# Patient Record
Sex: MALE | Race: BLACK OR AFRICAN AMERICAN | Employment: FULL TIME | URBAN - METROPOLITAN AREA
[De-identification: names, ages, dates, MRNs, and addresses within clinical notes are randomized per-mention and may not be internally consistent; named-entity substitution may affect disease eponyms.]

---

## 2017-01-22 RX ORDER — INSULIN LISPRO 100 [IU]/ML
INJECTION, SOLUTION INTRAVENOUS; SUBCUTANEOUS
Qty: 180 ML | Refills: 1 | Status: SHIPPED | OUTPATIENT
Start: 2017-01-22 | End: 2017-07-10 | Stop reason: SDUPTHER

## 2017-01-25 ENCOUNTER — OFFICE VISIT (OUTPATIENT)
Dept: INTERNAL MEDICINE CLINIC | Age: 42
End: 2017-01-25

## 2017-01-25 VITALS
HEART RATE: 106 BPM | RESPIRATION RATE: 16 BRPM | DIASTOLIC BLOOD PRESSURE: 56 MMHG | SYSTOLIC BLOOD PRESSURE: 124 MMHG | WEIGHT: 315 LBS | OXYGEN SATURATION: 98 % | BODY MASS INDEX: 47.74 KG/M2 | TEMPERATURE: 99.4 F | HEIGHT: 68 IN

## 2017-01-25 DIAGNOSIS — R68.89 FLU-LIKE SYMPTOMS: Primary | ICD-10-CM

## 2017-01-25 LAB
QUICKVUE INFLUENZA TEST: POSITIVE
VALID INTERNAL CONTROL?: YES

## 2017-01-25 RX ORDER — OSELTAMIVIR PHOSPHATE 75 MG/1
75 CAPSULE ORAL 2 TIMES DAILY
Qty: 10 CAP | Refills: 0 | Status: SHIPPED | OUTPATIENT
Start: 2017-01-25 | End: 2017-01-30

## 2017-01-25 NOTE — MR AVS SNAPSHOT
Visit Information Date & Time Provider Department Dept. Phone Encounter #  
 1/25/2017 10:15 AM Gretta Bloch, MD [facility] [phone] [id number] Your Appointments 2/7/2017 10:00 AM  
ROUTINE CARE with Gretta Bloch, MD  
[facility]) Appt Note: 1 month f/u  
 [place] [address] [facility] [id number] [place] [address] [address] [address] Upcoming Health Maintenance Date Due DTaP/Tdap/Td series (1 - Tdap) 10/20/1996 FOOT EXAM Q1 6/20/2015 HEMOGLOBIN A1C Q6M 10/22/2015 EYE EXAM RETINAL OR DILATED Q1 3/3/2016 MICROALBUMIN Q1 11/8/2017 LIPID PANEL Q1 11/8/2017 Allergies as of 1/25/2017  Review Complete On: 11/8/2016 By: Snehal Carolina LPN Severity Noted Reaction Type Reactions Lisinopril  12/09/2011    Cough Current Immunizations  Reviewed on 11/8/2016 Name Date Influenza Vaccine (Quad) PF 11/8/2016, 9/22/2014 Influenza Vaccine Split 12/9/2011, 10/15/2010 Pneumococcal Polysaccharide (PPSV-23) 3/21/2014 Not reviewed this visit You Were Diagnosed With   
  
 Codes Comments Flu-like symptoms    -  Primary ICD-10-CM: R68.89 ICD-9-CM: 780.99 Vitals BP Pulse Temp Resp Height(growth percentile) Weight(growth percentile) 124/56 (BP 1 Location: Left arm, BP Patient Position: Sitting) (!) 106 99.4 °F (37.4 °C) (Oral) 16 5' 8\" (1.727 m) 350 lb (158.8 kg) SpO2 BMI Smoking Status 98% 53.22 kg/m2 Never Smoker Vitals History BMI and BSA Data Body Mass Index Body Surface Area  
 53.22 kg/m 2 2.76 m 2 Preferred Pharmacy Pharmacy Name Phone [id number] [address] [phone] Your Updated Medication List  
  
   
 This list is accurate as of: 1/25/17 10:54 AM.  Always use your most recent med list.  
  
  
  
  
  insulin pump Misc Commonly known as:  PATIENT SUPPLIED  
by Does Not Apply route. aspirin 81 mg tablet Take  by mouth. atorvastatin 10 mg tablet Commonly known as:  LIPITOR  
TAKE 1 TABLET DAILY * Blood-Glucose Meter monitoring kit Commonly known as:  Gonzalo Givens  
by Does Not Apply route. Test 4 times daily * Blood-Glucose Meter Misc Commonly known as:  Yamileth Bybee IQ METER Use QID  
  
 empagliflozin 10 mg tablet Commonly known as:  Rhenda Cools Take 1 Tab by mouth daily. glucose blood VI test strips strip Commonly known as:  ONETOUCH ULTRA TEST Use QID * HumaLOG 100 unit/mL injection Generic drug:  insulin lispro USE AS DIRECTED IN INSULIN PUMP  
  
 * HumaLOG 100 unit/mL injection Generic drug:  insulin lispro INJECT IN INSULIN PUMP 12 UNITS PER HOUR PLUS BOLUSES. USE UP TO 6 VIALS EVERY MONTH (OFFICE VISIT REQUESTED) * losartan-hydroCHLOROthiazide 50-12.5 mg per tablet Commonly known as:  HYZAAR  
TAKE 1 TAB BY MOUTH DAILY. * losartan-hydroCHLOROthiazide 50-12.5 mg per tablet Commonly known as:  HYZAAR  
TAKE 1 TABLET BY MOUTH EVERY DAY  
  
 oseltamivir 75 mg capsule Commonly known as:  TAMIFLU Take 1 Cap by mouth two (2) times a day for 5 days. * Notice: This list has 6 medication(s) that are the same as other medications prescribed for you. Read the directions carefully, and ask your doctor or other care provider to review them with you. Prescriptions Sent to Pharmacy Refills  
 oseltamivir (TAMIFLU) 75 mg capsule 0 Sig: Take 1 Cap by mouth two (2) times a day for 5 days. Class: Normal  
 Pharmacy: [facility] [address] [place] [address] [address] Ph #: [phone] Route: Oral  
  
We Performed the Following AMB POC RAPID INFLUENZA TEST [01268 CPT(R)] Introducing [facility]! Cinthia Ríosa introduces [employer] patient portal. Now you can access parts of your medical record, email your doctor's office, and request medication refills online. 1. In your internet browser, go to [email or web address] 2. Click on the First Time User? Click Here link in the Sign In box. You will see the New Member Sign Up page. 3. Enter your [employer] Access Code exactly as it appears below. You will not need to use this code after youve completed the sign-up process. If you do not sign up before the expiration date, you must request a new code. · [employer] Access Code: [email or web address] Expires: 2/6/2017  3:14 PM 
 
4. Enter the last four digits of your Social Security Number (xxxx) and Date of Birth (mm/dd/yyyy) as indicated and click Submit. You will be taken to the next sign-up page. 5. Create a [employer] ID. This will be your [employer] login ID and cannot be changed, so think of one that is secure and easy to remember. 6. Create a [employer] password. You can change your password at any time. 7. Enter your Password Reset Question and Answer. This can be used at a later time if you forget your password. 8. Enter your e-mail address. You will receive e-mail notification when new information is available in [address] 9. Click Sign Up. You can now view and download portions of your medical record. 10. Click the Download Summary menu link to download a portable copy of your medical information. If you have questions, please visit the Frequently Asked Questions section of the [employer] website. Remember, [employer] is NOT to be used for urgent needs. For medical emergencies, dial 911. Now available from your iPhone and Android! Please provide this summary of care documentation to your next provider. Your primary care clinician is listed as Ting Jimenes.  If you have any questions after today's visit, please call [phone].

## 2017-01-25 NOTE — LETTER
NOTIFICATION RETURN TO WORK 
 
1/25/2017 10:52 AM 
 
Mr[address] 
[address] [id number] To Whom It May Concern: María Mohr is currently under the care of Sade Rae.. He will return to work on : Saturday, Jan 28th, 2017 If there are questions or concerns please have the patient contact our office. Sincerely, Riley Cortez MD

## 2017-01-25 NOTE — PROGRESS NOTES
Subjective: Alberto Innocent. is a 39 y.o. male who present complaining of flu-like symptoms: fevers, chills, myalgias, congestion, sore throat and cough for 5 days. Diarrhea one day  He denies dyspnea or wheezing. Smoking status: non-smoker. Flu vaccine status: vaccinated currently. only   < 2 weeks ago  Relevant PMH: DM.  Using nyquil dayquil  Review of Systems  A comprehensive review of systems was negative except for that written in the HPI. Past Medical History   Diagnosis Date    DM (diabetes mellitus) ([address]     HLD (hyperlipidaemia)     HTN        Objective:     Visit Vitals    /56 (BP 1 Location: Left arm, BP Patient Position: Sitting)    Pulse (!) 106    Temp 99.4 °F (37.4 °C) (Oral)    Resp 16    Ht 5' 8\" (1.727 m)    Wt 350 lb (158.8 kg)    SpO2 98%    BMI 53.22 kg/m2       Appears moderately ill but not toxic; temperature as noted in vitals. Ears normal.   Throat and pharynx normal.    Neck supple. No adenopathy in the neck. Sinuses non tender. The chest is clear. Assessment/Plan:   Influenza very likely from clinical presentation and seasonal pattern  Considerations for specific influenza anti-viral therapy: symptoms present > 48 hours, antiviral therapy unlikely to be effective  Symptomatic therapy suggested: rest, increase fluids, gargle prn for sore throat, apply heat to sinuses prn and call prn if symptoms persist or worsen. Call or return to clinic prn if these symptoms worsen or fail to improve as anticipated. Deepali Prather was seen today for headache, sore throat, diarrhea and fever. Diagnoses and all orders for this visit:    Flu-like symptoms  -     AMB POC RAPID INFLUENZA TEST  -     Cancel: AMB POC RAPID STREP A    Other orders  -     oseltamivir (TAMIFLU) 75 mg capsule; Take 1 Cap by mouth two (2) times a day for 5 days.     .will treat Influenza really worse since yesterday

## 2017-02-01 ENCOUNTER — APPOINTMENT (OUTPATIENT)
Dept: GENERAL RADIOLOGY | Age: 42
End: 2017-02-01
Attending: EMERGENCY MEDICINE
Payer: COMMERCIAL

## 2017-02-01 ENCOUNTER — HOSPITAL ENCOUNTER (EMERGENCY)
Age: 42
Discharge: HOME | End: 2017-02-01
Attending: EMERGENCY MEDICINE
Payer: COMMERCIAL

## 2017-02-01 VITALS
BODY MASS INDEX: 46.65 KG/M2 | WEIGHT: 315 LBS | HEIGHT: 69 IN | OXYGEN SATURATION: 98 % | SYSTOLIC BLOOD PRESSURE: 152 MMHG | RESPIRATION RATE: 16 BRPM | TEMPERATURE: 98.1 F | HEART RATE: 102 BPM | DIASTOLIC BLOOD PRESSURE: 98 MMHG

## 2017-02-01 DIAGNOSIS — J06.9 ACUTE UPPER RESPIRATORY INFECTION: Primary | ICD-10-CM

## 2017-02-01 PROCEDURE — 99282 EMERGENCY DEPT VISIT SF MDM: CPT

## 2017-02-01 PROCEDURE — 71020 XR CHEST PA LAT: CPT

## 2017-02-01 RX ORDER — HYDROCODONE BITARTRATE AND HOMATROPINE METHYLBROMIDE ORAL SOLUTION 5; 1.5 MG/5ML; MG/5ML
5 LIQUID ORAL
Qty: 100 ML | Refills: 0 | Status: SHIPPED | OUTPATIENT
Start: 2017-02-01 | End: 2018-01-18 | Stop reason: ALTCHOICE

## 2017-02-01 RX ORDER — BENZONATATE 100 MG/1
200 CAPSULE ORAL
Qty: 30 CAP | Refills: 0 | Status: SHIPPED | OUTPATIENT
Start: 2017-02-01 | End: 2017-02-08

## 2017-02-01 RX ORDER — FAMOTIDINE 20 MG/1
20 TABLET, FILM COATED ORAL 2 TIMES DAILY
Qty: 20 TAB | Refills: 0 | Status: SHIPPED | OUTPATIENT
Start: 2017-02-01 | End: 2018-02-06

## 2017-02-01 RX ORDER — NAPROXEN 500 MG/1
500 TABLET ORAL
Qty: 20 TAB | Refills: 0 | Status: SHIPPED | OUTPATIENT
Start: 2017-02-01 | End: 2018-01-18 | Stop reason: ALTCHOICE

## 2017-02-01 NOTE — LETTER
[address] 
[facility] EMERGENCY DEPT 
[address] [place] [address] [address] [id number] [phone] Work/School Note Date: 2/1/2017 To Whom It May concern: Laura Bravo was seen and treated today in the emergency room by the following provider(s): 
Attending Provider: Ranjit Orosco MD 
Physician Assistant: JOSE White. Laura Bravo may return to work on 2/4/17 or sooner, if feeling better. Sincerely, Destiny William, PA

## 2017-02-02 ENCOUNTER — DOCUMENTATION ONLY (OUTPATIENT)
Dept: INTERNAL MEDICINE CLINIC | Age: 42
End: 2017-02-02

## 2017-02-02 NOTE — DISCHARGE INSTRUCTIONS

## 2017-02-02 NOTE — ED PROVIDER NOTES
HPI Comments: Philip Gonzalez is a 39 y.o. male with PMhx significant for HTN, DM, HLD who presents ambulatory to the ED c/o a progressively worsening dry hacking cough, sore throat, generalized abdominal pain, and lightheadedness x2 weeks. He states that he saw his PCP last week and was diagnosed with influenza noting that he completed his Kamaljit flu prescription. He endorses that his sx were getting better but then the cough started which has progressed to chest discomfort secondary to coughing. He notes that he has been using cough drops, DayQuil, NyQuil, and Motrin for his sx with minor relief leading him to the ED for further evaluation. Pt discloses that his blood sugars have been running slightly high in the low 200's. He denies any h/o kidney disease or liver disease. He specifically denies any decreased appetite, ear pain, nausea, vomiting, or diarrhea at this time. PCP: Mehul Sierra MD      There are no other complaints, changes or physical findings at this time. Written by MEGAN Puentesibeleuterio, as dictated by SUZI Hill     The history is provided by the patient. No  was used. Past Medical History:   Diagnosis Date    DM (diabetes mellitus) ([address]     HLD (hyperlipidaemia)     HTN        Past Surgical History:   Procedure Laterality Date    Hc foot/toes surgery procedure       orif 2000    Hx orthopaedic  1998     pins in toes    Hx refractive surgery           Family History:   Problem Relation Age of Onset    Diabetes Mother        Social History     Social History    Marital status:      Spouse name: N/A    Number of children: N/A    Years of education: N/A     Occupational History    Not on file.      Social History Main Topics    Smoking status: Never Smoker    Smokeless tobacco: Never Used    Alcohol use No    Drug use: No    Sexual activity: Not Currently     Partners: Female     Other Topics Concern    Not on file Social History Narrative         ALLERGIES: Lisinopril    Review of Systems   Constitutional: Negative for appetite change, chills, diaphoresis, fever and unexpected weight change. HENT: Positive for sore throat. Negative for ear pain and rhinorrhea. Eyes: Negative for pain. Respiratory: Positive for cough. Negative for shortness of breath, wheezing and stridor. Cardiovascular: Positive for chest pain (secondary to coughing). Negative for leg swelling. Gastrointestinal: Positive for abdominal pain (\"stomach ache\"). Negative for blood in stool, diarrhea, nausea and vomiting. Genitourinary: Negative for difficulty urinating, dysuria and flank pain. Musculoskeletal: Negative for back pain and neck stiffness. Skin: Negative for rash. Neurological: Positive for light-headedness. Negative for seizures, syncope, weakness and headaches. Psychiatric/Behavioral: Negative for confusion. Patient Vitals for the past 12 hrs:   Temp Pulse Resp BP SpO2   02/01/17 2300 - - - (!) 152/98 -   02/01/17 1849 98.1 °F (36.7 °C) (!) 102 16 (!) 193/98 98 %        Physical Exam   Constitutional: He is oriented to person, place, and time. He appears well-developed and well-nourished. No distress. Ambulatory with out difficulty    HENT:   Head: Normocephalic and atraumatic. Right Ear: External ear normal.   Left Ear: External ear normal.   Nose: Nose normal.   Mouth/Throat: Oropharynx is clear and moist. No oropharyngeal exudate. Eyes: Conjunctivae and EOM are normal. Pupils are equal, round, and reactive to light. Right eye exhibits no discharge. Left eye exhibits no discharge. No scleral icterus. Neck: Normal range of motion. Neck supple. No tracheal deviation present. Cardiovascular: Normal rate, regular rhythm, normal heart sounds and intact distal pulses. Exam reveals no gallop and no friction rub. No murmur heard.   Pulmonary/Chest: Effort normal and breath sounds normal. No respiratory distress. He has no wheezes. He has no rales. He exhibits no tenderness. Dry hacking cough    Abdominal: Soft. Bowel sounds are normal. He exhibits no distension and no mass. There is no tenderness. There is no rebound and no guarding. Musculoskeletal: He exhibits no edema or tenderness. Lymphadenopathy:     He has no cervical adenopathy. Neurological: He is alert and oriented to person, place, and time. No cranial nerve deficit. Coordination normal.   Skin: Skin is warm and dry. No rash noted. No erythema. Psychiatric: He has a normal mood and affect. His behavior is normal. Judgment and thought content normal.   Nursing note and vitals reviewed. MDM  Number of Diagnoses or Management Options  Acute upper respiratory infection:   Elevated blood pressure:   Diagnosis management comments: DDx: Elevated blood pressure, URI, PNA, Bronchitis. Amount and/or Complexity of Data Reviewed  Tests in the radiology section of CPT®: ordered and reviewed  Review and summarize past medical records: yes    Patient Progress  Patient progress: stable    ED Course       Procedures      PROGRESS NOTE:  9:31 PM  SUZI Zavala was at pt's room for evaluation but the pt was not present. Written by MEGAN Moore, as dictated by SUZI Zavala. PROGRESS NOTE:  9:44  PM  SUZI Zavala was at pt's room for evaluation but the pt was not present. Written by MEGAN Moore, as dictated by SUZI Zavala. PROGRESS NOTE:  10:29 PM  Pt has been re-evaluated. The pt notes that he is driving himself and asked if his prescriptions could be sent to his pharmacy for  later tonight. Written by MEGAN Moore, as dictated by SUZI Zavala. PROGRESS NOTE:  11:10 PM  Pt has been re-evaluated. Pt denies any headache, dizziness, or blurred vision. He was advised to have his blood pressure checked when he is not in the ED. Written by Carrie Ortega ED Erick, as dictated by [employer]. IMAGING RESULTS:  XR CHEST PA LAT   Final Result   CLINICAL HISTORY: Cough and fever   INDICATION: Cough and fever     COMPARISON: None     FINDINGS:   PA and lateral views of the chest are obtained. The cardiopericardial silhouette is within normal limits. There is no pleural  effusion, pneumothorax or focal consolidation present.     IMPRESSION  IMPRESSION: No acute intrathoracic disease. IMPRESSION:  1. Acute upper respiratory infection    2. Elevated blood pressure        PLAN:  1. Current Discharge Medication List      START taking these medications    Details   HYDROcodone-homatropine (HYDROMET) 5-1.5 mg/5 mL syrup Take 5 mL by mouth four (4) times daily as needed. Max Daily Amount: 20 mL. Qty: 100 mL, Refills: 0      benzonatate (TESSALON PERLES) 100 mg capsule Take 2 Caps by mouth three (3) times daily as needed for Cough for up to 7 days. Qty: 30 Cap, Refills: 0      naproxen (NAPROSYN) 500 mg tablet Take 1 Tab by mouth every twelve (12) hours as needed for Pain. Qty: 20 Tab, Refills: 0      famotidine (PEPCID) 20 mg tablet Take 1 Tab by mouth two (2) times a day. Qty: 20 Tab, Refills: 0         CONTINUE these medications which have NOT CHANGED    Details   !! HUMALOG 100 unit/mL injection INJECT IN INSULIN PUMP 12 UNITS PER HOUR PLUS BOLUSES. USE UP TO 6 VIALS EVERY MONTH (OFFICE VISIT REQUESTED)  Qty: 180 mL, Refills: 1      !! losartan-hydroCHLOROthiazide (HYZAAR) 50-12.5 mg per tablet TAKE 1 TABLET BY MOUTH EVERY DAY  Qty: 30 Tab, Refills: 5      !! losartan-hydroCHLOROthiazide (HYZAAR) 50-12.5 mg per tablet TAKE 1 TAB BY MOUTH DAILY. Qty: 90 Tab, Refills: 1      atorvastatin (LIPITOR) 10 mg tablet TAKE 1 TABLET DAILY  Qty: 90 Tab, Refills: 2      empagliflozin (JARDIANCE) 10 mg tablet Take 1 Tab by mouth daily.   Qty: 30 Tab, Refills: 5      Blood-Glucose Meter (ONE TOUCH VERIO IQ METER) misc Use QID  Qty: 1 each, Refills: 0      glucose blood VI test strips (ONE TOUCH ULTRA TEST) strip Use QID  Qty: 1 Package, Refills: 11      !! HUMALOG 100 unit/mL injection USE AS DIRECTED IN INSULIN PUMP  Qty: 30 mL, Refills: 1      Blood-Glucose Meter (ONE TOUCH ULTRA 2) monitoring kit by Does Not Apply route. Test 4 times daily  Qty: 3 Kit, Refills: 5      Subcutaneous Insulin Pump Misc by Does Not Apply route. Associated Diagnoses: DM (diabetes mellitus) ([facility]      aspirin 81 mg tablet Take  by mouth. !! - Potential duplicate medications found. Please discuss with provider. 2.   Follow-up Information     Follow up With Details Comments 1917 Lowell Martinez MD   [address] [place] [address] Ave  [phone]      [facility] EMERGENCY DEPT  If symptoms worsen [address]  [facility] [address]   [address] [address] [phone]        3. Return to ED if worse   Discharge Note:  11:07 PM  The patient is ready for discharge. The patient's signs, symptoms, diagnosis, and discharge instruction have been discussed and the patient has conveyed their understanding. The patient is to follow up as recommended or return to the ER should their symptoms worsen. Plan has been discussed and the patient is in agreement. Written by Roxy Ortega ED Scribe, as dictated by SUZI Torres    Attestation: This note is prepared by Zenaida Ortega, acting as Scribe for [employer]. SUZI Torres: The scribe's documentation has been prepared under my direction and personally reviewed by me in its entirety. I confirm that the note above accurately reflects all work, treatment, procedures, and medical decision making performed by me.

## 2017-02-02 NOTE — ED NOTES
Discharge instructions reviewed with pt and copy given along with RX by ER PA Doreatha Osler. Pt ambulatory from ED accompanied by self in no sign of distress or discomfort.

## 2017-02-02 NOTE — PROGRESS NOTES
[facility] for [facility] ED visit on 02/01/2017, chief complaint acute upper resp. Infection. NN reviewed chart patient had OV on 1/25, +flu at time given tamifllu. Patient presented to ED after finishing tamiflu with no improvement. Given prescription for tessalon perles, naproxen, hydrocodone, pepcid and discharged home. Follow up appointment scheduled with  on [email or web address].  Patient needs follow up for DM mgmt/education. It appears patients last A1C was 10.9 done in 04/2015 and BMI is 51.86. Patient has been on insulin since age of 15, patient has an insulin pump.  has hypertension as well that is not controlled. BP readings in ED were 193/98 and 152/98 with pulse in the low 100s.   NN will follow up with patient at [facility] on 2/7/17

## 2017-02-03 ENCOUNTER — OFFICE VISIT (OUTPATIENT)
Dept: INTERNAL MEDICINE CLINIC | Age: 42
End: 2017-02-03

## 2017-02-03 VITALS
HEART RATE: 92 BPM | BODY MASS INDEX: 46.65 KG/M2 | RESPIRATION RATE: 17 BRPM | SYSTOLIC BLOOD PRESSURE: 162 MMHG | OXYGEN SATURATION: 97 % | TEMPERATURE: 97.8 F | WEIGHT: 315 LBS | DIASTOLIC BLOOD PRESSURE: 92 MMHG | HEIGHT: 69 IN

## 2017-02-03 DIAGNOSIS — J06.9 ACUTE URI: Primary | ICD-10-CM

## 2017-02-03 RX ORDER — AZITHROMYCIN 250 MG/1
250 TABLET, FILM COATED ORAL SEE ADMIN INSTRUCTIONS
Qty: 6 TAB | Refills: 0 | Status: SHIPPED | OUTPATIENT
Start: 2017-02-03 | End: 2017-02-08

## 2017-02-03 NOTE — PROGRESS NOTES
Subjective: Chana Moran is a 39 y.o. male who complains of congestion, sore throat, productive cough and headache for several days, gradually worsening since that time. He denies a history of shortness of breath and wheezing. Evaluation to date: patient was seen here on January 25 th and was diagnosed with the flu. He started the tamiflu and states he was feeling better. Then his symptoms seemed to be coming back so he went to the ER on Feb 1 st. He was diagnosed with a URI. He was not given an antibiotic at that time because his mucous was still clear and they thought it was viral. Today he states he is not better and now his mucous is greenish. 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 Objective:     Visit Vitals    BP (!) 162/92 (BP 1 Location: Left arm, BP Patient Position: Sitting)    Pulse 92    Temp 97.8 °F (36.6 °C) (Oral)    Resp 17    Ht 5' 9\" (1.753 m)    Wt (!) 354 lb (160.6 kg)    SpO2 97%    BMI 52.28 kg/m2     General:  alert, cooperative, no distress   Eyes: negative   Ears: normal TM's and external ear canals AU   Sinuses: Normal paranasal sinuses without tenderness   Mouth:  abnormal findings: mild oropharyngeal erythema   Neck: supple, symmetrical, trachea midline and no adenopathy. Heart: normal rate and regular rhythm, no murmurs noted.     Lungs: clear to auscultation bilaterally   Abdomen: Not examined        Assessment/Plan:   upper respiratory illness  Antibiotics per orders. RTC prn. Teodora Tineo was seen today for other. Diagnoses and all orders for this visit:    Acute URI  -     azithromycin (ZITHROMAX) 250 mg tablet; Take 1 Tab by mouth See Admin Instructions for 5 days. .take medication as prescribed. If not getting better then follow up. Patient states he has been a little constipated. Suggested he try colace and miralax.

## 2017-02-03 NOTE — MR AVS SNAPSHOT
Visit Information Date & Time Provider Department Dept. Phone Encounter #  
 2/3/2017  8:30 AM Deshaun Kessler PA-C [facility] [phone] [id number] Your Appointments 2/7/2017 10:00 AM  
ROUTINE CARE with Dakota Magallon MD  
[facility]) Appt Note: 1 month f/u  
 [place] [address] [facility] [address]  
[facility] [address] [address] Upcoming Health Maintenance Date Due DTaP/Tdap/Td series (1 - Tdap) 10/20/1996 FOOT EXAM Q1 6/20/2015 HEMOGLOBIN A1C Q6M 10/22/2015 EYE EXAM RETINAL OR DILATED Q1 3/3/2016 MICROALBUMIN Q1 11/8/2017 LIPID PANEL Q1 11/8/2017 Allergies as of 2/3/2017  Review Complete On: 2/3/2017 By: Deshaun Kessler PA-C Severity Noted Reaction Type Reactions Lisinopril  12/09/2011    Cough Current Immunizations  Reviewed on 11/8/2016 Name Date Influenza Vaccine (Quad) PF 11/8/2016, 9/22/2014 Influenza Vaccine Split 12/9/2011, 10/15/2010 Pneumococcal Polysaccharide (PPSV-23) 3/21/2014 Not reviewed this visit You Were Diagnosed With   
  
 Codes Comments Acute URI    -  Primary ICD-10-CM: J06.9 ICD-9-CM: 465.9 Vitals BP Pulse Temp Resp Height(growth percentile) Weight(growth percentile) (!) 162/92 (BP 1 Location: Left arm, BP Patient Position: Sitting) 92 97.8 °F (36.6 °C) (Oral) 17 5' 9\" (1.753 m) (!) 354 lb (160.6 kg) SpO2 BMI Smoking Status 97% 52.28 kg/m2 Never Smoker Vitals History BMI and BSA Data Body Mass Index Body Surface Area  
 52.28 kg/m 2 2.8 m 2 Preferred Pharmacy Pharmacy Name Phone [id number] [address] [phone] Your Updated Medication List  
  
   
 This list is accurate as of: 2/3/17  8:56 AM.  Always use your most recent med list.  
  
  
  
  
  insulin pump Misc Commonly known as:  PATIENT SUPPLIED  
by Does Not Apply route. aspirin 81 mg tablet Take  by mouth. atorvastatin 10 mg tablet Commonly known as:  LIPITOR  
TAKE 1 TABLET DAILY  
  
 azithromycin 250 mg tablet Commonly known as:  Elsworth Heaps Take 1 Tab by mouth See Admin Instructions for 5 days. benzonatate 100 mg capsule Commonly known as:  TESSALON PERLES Take 2 Caps by mouth three (3) times daily as needed for Cough for up to 7 days. * Blood-Glucose Meter monitoring kit Commonly known as:  Author Sos  
by Does Not Apply route. Test 4 times daily * Blood-Glucose Meter Misc Commonly known as:  Jenny Jennifere IQ METER Use QID  
  
 empagliflozin 10 mg tablet Commonly known as:  Lattie Brian Take 1 Tab by mouth daily. famotidine 20 mg tablet Commonly known as:  PEPCID Take 1 Tab by mouth two (2) times a day. glucose blood VI test strips strip Commonly known as:  ONETOUCH ULTRA TEST Use QID * HumaLOG 100 unit/mL injection Generic drug:  insulin lispro USE AS DIRECTED IN INSULIN PUMP  
  
 * HumaLOG 100 unit/mL injection Generic drug:  insulin lispro INJECT IN INSULIN PUMP 12 UNITS PER HOUR PLUS BOLUSES. USE UP TO 6 VIALS EVERY MONTH (OFFICE VISIT REQUESTED) HYDROcodone-homatropine 5-1.5 mg/5 mL syrup Commonly known as:  HYDROMET Take 5 mL by mouth four (4) times daily as needed. Max Daily Amount: 20 mL. * losartan-hydroCHLOROthiazide 50-12.5 mg per tablet Commonly known as:  HYZAAR  
TAKE 1 TAB BY MOUTH DAILY. * losartan-hydroCHLOROthiazide 50-12.5 mg per tablet Commonly known as:  HYZAAR  
TAKE 1 TABLET BY MOUTH EVERY DAY  
  
 naproxen 500 mg tablet Commonly known as:  NAPROSYN Take 1 Tab by mouth every twelve (12) hours as needed for Pain. * Notice: This list has 6 medication(s) that are the same as other medications prescribed for you. Read the directions carefully, and ask your doctor or other care provider to review them with you. Prescriptions Sent to Pharmacy Refills  
 azithromycin (ZITHROMAX) 250 mg tablet 0 Sig: Take 1 Tab by mouth See Admin Instructions for 5 days. Class: Normal  
 Pharmacy: [facility]pharm[facility] [address] [address] [facility] [address] Ph #: [phone] Route: Oral  
  
Introducing [facility]! Ozzy Whitney introduces The Digital Marvels patient portal. Now you can access parts of your medical record, email your doctor's office, and request medication refills online. 1. In your internet browser, go to [email or web address]: [email or web address] 2. Click on the First Time User? Click Here link in the Sign In box. You will see the New Member Sign Up page. 3. Enter your The Digital Marvels Access Code exactly as it appears below. You will not need to use this code after youve completed the sign-up process. If you do not sign up before the expiration date, you must request a new code. · The Digital Marvels Access Code: [email or web address] Expires: 2/6/2017  3:14 PM 
 
4. Enter the last four digits of your Social Security Number (xxxx) and Date of Birth (mm/dd/yyyy) as indicated and click Submit. You will be taken to the next sign-up page. 5. Create a The Digital Marvels ID. This will be your The Digital Marvels login ID and cannot be changed, so think of one that is secure and easy to remember. 6. Create a The Digital Marvels password. You can change your password at any time. 7. Enter your Password Reset Question and Answer. This can be used at a later time if you forget your password. 8. Enter your e-mail address. You will receive e-mail notification when new information is available in [address] 9. Click Sign Up. You can now view and download portions of your medical record. 10. Click the Download Summary menu link to download a portable copy of your medical information. If you have questions, please visit the Frequently Asked Questions section of the BakedCode website. Remember, BakedCode is NOT to be used for urgent needs. For medical emergencies, dial 911. Now available from your iPhone and Android! Please provide this summary of care documentation to your next provider. Your primary care clinician is listed as Saintclair Rima. If you have any questions after today's visit, please call [phone].

## 2017-04-20 RX ORDER — LOSARTAN POTASSIUM AND HYDROCHLOROTHIAZIDE 12.5; 5 MG/1; MG/1
TABLET ORAL
Qty: 90 TAB | Refills: 0 | Status: SHIPPED | OUTPATIENT
Start: 2017-04-20 | End: 2017-08-04 | Stop reason: SDUPTHER

## 2017-07-10 RX ORDER — INSULIN LISPRO 100 [IU]/ML
INJECTION, SOLUTION INTRAVENOUS; SUBCUTANEOUS
Qty: 180 ML | Refills: 0 | Status: SHIPPED | OUTPATIENT
Start: 2017-07-10 | End: 2017-10-10 | Stop reason: SDUPTHER

## 2017-07-16 RX ORDER — ATORVASTATIN CALCIUM 10 MG/1
TABLET, FILM COATED ORAL
Qty: 90 TAB | Refills: 1 | Status: SHIPPED | OUTPATIENT
Start: 2017-07-16 | End: 2017-10-10 | Stop reason: SDUPTHER

## 2017-08-05 RX ORDER — LOSARTAN POTASSIUM AND HYDROCHLOROTHIAZIDE 12.5; 5 MG/1; MG/1
TABLET ORAL
Qty: 90 TAB | Refills: 1 | Status: SHIPPED | OUTPATIENT
Start: 2017-08-05 | End: 2017-10-10 | Stop reason: SDUPTHER

## 2017-10-10 ENCOUNTER — OFFICE VISIT (OUTPATIENT)
Dept: INTERNAL MEDICINE CLINIC | Age: 42
End: 2017-10-10

## 2017-10-10 VITALS
WEIGHT: 315 LBS | HEIGHT: 69 IN | BODY MASS INDEX: 46.65 KG/M2 | TEMPERATURE: 98 F | SYSTOLIC BLOOD PRESSURE: 117 MMHG | HEART RATE: 103 BPM | DIASTOLIC BLOOD PRESSURE: 53 MMHG | OXYGEN SATURATION: 96 % | RESPIRATION RATE: 16 BRPM

## 2017-10-10 DIAGNOSIS — E08.3299 DIABETES MELLITUS DUE TO UNDERLYING CONDITION WITH MILD NONPROLIFERATIVE RETINOPATHY WITHOUT MACULAR EDEMA, WITH LONG-TERM CURRENT USE OF INSULIN, UNSPECIFIED LATERALITY (HCC): Primary | ICD-10-CM

## 2017-10-10 DIAGNOSIS — I10 ESSENTIAL HYPERTENSION: ICD-10-CM

## 2017-10-10 DIAGNOSIS — E78.00 PURE HYPERCHOLESTEROLEMIA: ICD-10-CM

## 2017-10-10 DIAGNOSIS — Z23 ENCOUNTER FOR IMMUNIZATION: ICD-10-CM

## 2017-10-10 DIAGNOSIS — Z79.4 DIABETES MELLITUS DUE TO UNDERLYING CONDITION WITH MILD NONPROLIFERATIVE RETINOPATHY WITHOUT MACULAR EDEMA, WITH LONG-TERM CURRENT USE OF INSULIN, UNSPECIFIED LATERALITY (HCC): Primary | ICD-10-CM

## 2017-10-10 LAB — HBA1C MFR BLD HPLC: 11.2 %

## 2017-10-10 RX ORDER — INSULIN LISPRO 100 [IU]/ML
INJECTION, SOLUTION INTRAVENOUS; SUBCUTANEOUS
Qty: 180 ML | Refills: 1
Start: 2017-10-10 | End: 2017-10-30 | Stop reason: SDUPTHER

## 2017-10-10 RX ORDER — LOSARTAN POTASSIUM AND HYDROCHLOROTHIAZIDE 12.5; 5 MG/1; MG/1
TABLET ORAL
Qty: 90 TAB | Refills: 1 | Status: SHIPPED | OUTPATIENT
Start: 2017-10-10 | End: 2018-01-18 | Stop reason: ALTCHOICE

## 2017-10-10 RX ORDER — ATORVASTATIN CALCIUM 10 MG/1
TABLET, FILM COATED ORAL
Qty: 90 TAB | Refills: 3 | Status: SHIPPED | OUTPATIENT
Start: 2017-10-10 | End: 2018-10-07 | Stop reason: SDUPTHER

## 2017-10-10 NOTE — PROGRESS NOTES
Coordination of Care Questions    1. Have you been to the ER, urgent care clinic since your last visit? No       Hospitalized since your last visit? No    2. Have you seen or consulted any other health care providers outside of the [address] [place] [address] since your last visit? Include any pap smears or colon screening.  No

## 2017-10-10 NOTE — PROGRESS NOTES
SUBJECTIVE:  39 y.o. male for follow up of diabetes. Diabetic Review of Systems - medication compliance: compliant all of the time, diabetic diet compliance: noncompliant some of the time, home glucose monitoring: is performed regularly, fasting values range 120 to 130, acute symptoms are none. Other symptoms and concerns:weight gain. Needing large amount insulin  up to 100 units   Basal   6 U per hour,  15 to 2 u bolus  Has seen lower readings lately  On pump > 6 years5  On Insulin  Since 15 years ago   c peptide suggests type 2    Current Outpatient Prescriptions   Medication Sig Dispense Refill    atorvastatin (LIPITOR) 10 mg tablet TAKE 1 TABLET DAILY 90 Tab 1    losartan-hydroCHLOROthiazide (HYZAAR) 50-12.5 mg per tablet TAKE 1 TABLET BY MOUTH EVERY DAY 30 Tab 5    HUMALOG 100 unit/mL injection USE AS DIRECTED IN INSULIN PUMP 30 mL 1    aspirin 81 mg tablet Take  by mouth.  losartan-hydroCHLOROthiazide (HYZAAR) 50-12.5 mg per tablet TAKE 1 TABLET DAILY 90 Tab 1    HUMALOG 100 unit/mL injection INJECT IN INSULIN PUMP 12 UNITS PER HOUR PLUS BOLUSES. USE UP TO 6 VIALS EVERY MONTH (OFFICE VISIT REQUESTED) 180 mL 0    HYDROcodone-homatropine (HYDROMET) 5-1.5 mg/5 mL syrup Take 5 mL by mouth four (4) times daily as needed. Max Daily Amount: 20 mL. 100 mL 0    naproxen (NAPROSYN) 500 mg tablet Take 1 Tab by mouth every twelve (12) hours as needed for Pain. 20 Tab 0    famotidine (PEPCID) 20 mg tablet Take 1 Tab by mouth two (2) times a day. 20 Tab 0    empagliflozin (JARDIANCE) 10 mg tablet Take 1 Tab by mouth daily. 30 Tab 5    Blood-Glucose Meter (ONE TOUCH VERIO IQ METER) misc Use QID 1 each 0    glucose blood VI test strips (ONE TOUCH ULTRA TEST) strip Use QID 1 Package 11    Blood-Glucose Meter (ONE TOUCH ULTRA 2) monitoring kit by Does Not Apply route. Test 4 times daily 3 Kit 5    Subcutaneous Insulin Pump Misc by Does Not Apply route.        Vitals:    10/10/17 0836   BP: 117/53   Pulse: (!) 103   Resp: 16   Temp: 98 °F (36.7 °C)   TempSrc: Oral   SpO2: 96%   Weight: (!) 362 lb (164.2 kg)   Height: 5' 9\" (1.753 m)     Vitals:    10/10/17 0836   BP: 117/53   Pulse: (!) 103   Resp: 16   Temp: 98 °F (36.7 °C)   TempSrc: Oral   SpO2: 96%   Weight: (!) 362 lb (164.2 kg)   Height: 5' 9\" (1.753 m)         OBJECTIVE:  Appearance: alert, well appearing, and in no distress and overweight.   Visit Vitals    /53 (BP 1 Location: Left arm, BP Patient Position: Sitting)    Pulse (!) 103    Temp 98 °F (36.7 °C) (Oral)    Resp 16    Ht 5' 9\" (1.753 m)    Wt (!) 362 lb (164.2 kg)    SpO2 96%    BMI 53.46 kg/m2     Wt Readings from Last 3 Encounters:   10/10/17 (!) 362 lb (164.2 kg)   02/03/17 (!) 354 lb (160.6 kg)   02/01/17 (!) 351 lb 3.1 oz (159.3 kg)       Cardiovascular ROS: no chest pain or dyspnea on exertion  Neurological ROS: no TIA or stroke symptoms  General ROS: negative for - chills, fatigue, fever, malaise, night sweats or sleep disturbance  Endocrine ROS: negative for - hair pattern changes, hot flashes, malaise/lethargy, palpitations, polydipsia/polyuria, temperature intolerance or unexpected weight changes  Has not seen endocrine  Fatigue frequent awakenings snoring noted  Exam: heart sounds normal rate, regular rhythm, normal S1, S2, no murmurs, rubs, clicks or gallops, chest clear, no hepatosplenomegaly  Some ankle edema  Never got jardiance as requested last year    Insurance would not   OK despite couponASSESSMENT:  Diabetes Mellitus: asymptomatic, poor controlled, no significant medication side effects noted  Suspect type 2 and  Trial alternte meds  [place] a try  Was on metformin at one time  Hypertension borderline  Controlled   edema from obesity  Weight gain from insulin  PLAN:  See orders for this visit as documented in the electronic medical record Issues reviewed with him: referral to Diabetic Education department, diabetic diet discussed in detail, written exchange diet given and low cholesterol diet, weight control and daily exercise discussed. The patient is advised to begin progressive daily aerobic exercise program, follow a low fat, low cholesterol diet and attempt to lose weight. Results for orders placed or performed in visit on 10/10/17   AMB POC HEMOGLOBIN A1C   Result Value Ref Range    Hemoglobin A1c (POC) 11.2 %             Hypertension not controlled for age based on guidelines        Middle of night readings to look for hypoglycemia     Diagnoses and all orders for this visit:    1. 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 Encounter for immunization  Had flu last year  - Influenza virus vaccine (QUADRIVALENT PRES FREE SYRINGE) IM (96671)    3. Essential hypertension  controlled    4.  Pure hypercholesterolemia  controlled    Advise prevnar on outside

## 2017-10-11 LAB
ALBUMIN SERPL-MCNC: 4.3 G/DL (ref 3.5–5.5)
ALBUMIN/CREAT UR: 6 MG/G CREAT (ref 0–30)
ALBUMIN/GLOB SERPL: 1.4 {RATIO} (ref 1.2–2.2)
ALP SERPL-CCNC: 45 IU/L (ref 39–117)
ALT SERPL-CCNC: 45 IU/L (ref 0–44)
AST SERPL-CCNC: 34 IU/L (ref 0–40)
BASOPHILS # BLD AUTO: 0 X10E3/UL (ref 0–0.2)
BASOPHILS NFR BLD AUTO: 0 %
BILIRUB SERPL-MCNC: 0.8 MG/DL (ref 0–1.2)
BUN SERPL-MCNC: 12 MG/DL (ref 6–24)
BUN/CREAT SERPL: 13 (ref 9–20)
CALCIUM SERPL-MCNC: 8.8 MG/DL (ref 8.7–10.2)
CHLORIDE SERPL-SCNC: 97 MMOL/L (ref 96–106)
CHOLEST SERPL-MCNC: 95 MG/DL (ref 100–199)
CO2 SERPL-SCNC: 25 MMOL/L (ref 18–29)
CREAT SERPL-MCNC: 0.93 MG/DL (ref 0.76–1.27)
CREAT UR-MCNC: 66.6 MG/DL
EOSINOPHIL # BLD AUTO: 0.1 X10E3/UL (ref 0–0.4)
EOSINOPHIL NFR BLD AUTO: 3 %
ERYTHROCYTE [DISTWIDTH] IN BLOOD BY AUTOMATED COUNT: 14.2 % (ref 12.3–15.4)
GLOBULIN SER CALC-MCNC: 3 G/DL (ref 1.5–4.5)
GLUCOSE SERPL-MCNC: 92 MG/DL (ref 65–99)
HCT VFR BLD AUTO: 42.2 % (ref 37.5–51)
HDLC SERPL-MCNC: 32 MG/DL
HGB BLD-MCNC: 14.6 G/DL (ref 12.6–17.7)
IMM GRANULOCYTES # BLD: 0 X10E3/UL (ref 0–0.1)
IMM GRANULOCYTES NFR BLD: 0 %
INTERPRETATION, 910389: NORMAL
LDLC SERPL CALC-MCNC: 39 MG/DL (ref 0–99)
LYMPHOCYTES # BLD AUTO: 1.7 X10E3/UL (ref 0.7–3.1)
LYMPHOCYTES NFR BLD AUTO: 38 %
MCH RBC QN AUTO: 28.3 PG (ref 26.6–33)
MCHC RBC AUTO-ENTMCNC: 34.6 G/DL (ref 31.5–35.7)
MCV RBC AUTO: 82 FL (ref 79–97)
MICROALBUMIN UR-MCNC: 4 UG/ML
MONOCYTES # BLD AUTO: 0.3 X10E3/UL (ref 0.1–0.9)
MONOCYTES NFR BLD AUTO: 7 %
NEUTROPHILS # BLD AUTO: 2.3 X10E3/UL (ref 1.4–7)
NEUTROPHILS NFR BLD AUTO: 52 %
PLATELET # BLD AUTO: 180 X10E3/UL (ref 150–379)
POTASSIUM SERPL-SCNC: 3.6 MMOL/L (ref 3.5–5.2)
PROT SERPL-MCNC: 7.3 G/DL (ref 6–8.5)
RBC # BLD AUTO: 5.16 X10E6/UL (ref 4.14–5.8)
SODIUM SERPL-SCNC: 139 MMOL/L (ref 134–144)
TRIGL SERPL-MCNC: 120 MG/DL (ref 0–149)
VLDLC SERPL CALC-MCNC: 24 MG/DL (ref 5–40)
WBC # BLD AUTO: 4.5 X10E3/UL (ref 3.4–10.8)

## 2017-10-30 RX ORDER — INSULIN LISPRO 100 [IU]/ML
INJECTION, SOLUTION INTRAVENOUS; SUBCUTANEOUS
Qty: 180 ML | Refills: 1
Start: 2017-10-30 | End: 2018-01-18 | Stop reason: SDUPTHER

## 2017-11-27 ENCOUNTER — TELEPHONE (OUTPATIENT)
Dept: INTERNAL MEDICINE CLINIC | Age: 42
End: 2017-11-27

## 2017-11-27 NOTE — TELEPHONE ENCOUNTER
Patient is requesting a script for a new insulin pump. He says the other one broke.  He also says he has something to discuss with

## 2018-01-11 NOTE — TELEPHONE ENCOUNTER
Patient called in requesting a short supply of insulin be called into [facility]. His refill coming through the mail order will take too long and he is completely out and the med is schedule to reach him on monday.   [phone]

## 2018-01-14 RX ORDER — INSULIN LISPRO 100 [IU]/ML
INJECTION, SOLUTION INTRAVENOUS; SUBCUTANEOUS
Qty: 10 ML | Refills: 0 | Status: SHIPPED | OUTPATIENT
Start: 2018-01-14 | End: 2018-02-06

## 2018-01-18 ENCOUNTER — OFFICE VISIT (OUTPATIENT)
Dept: INTERNAL MEDICINE CLINIC | Age: 43
End: 2018-01-18

## 2018-01-18 VITALS
BODY MASS INDEX: 46.65 KG/M2 | WEIGHT: 315 LBS | DIASTOLIC BLOOD PRESSURE: 85 MMHG | HEART RATE: 100 BPM | OXYGEN SATURATION: 98 % | HEIGHT: 69 IN | SYSTOLIC BLOOD PRESSURE: 150 MMHG | RESPIRATION RATE: 16 BRPM

## 2018-01-18 DIAGNOSIS — E08.3299 DIABETES MELLITUS DUE TO UNDERLYING CONDITION WITH MILD NONPROLIFERATIVE RETINOPATHY WITHOUT MACULAR EDEMA, WITH LONG-TERM CURRENT USE OF INSULIN, UNSPECIFIED LATERALITY (HCC): Primary | ICD-10-CM

## 2018-01-18 DIAGNOSIS — Z79.4 DIABETES MELLITUS DUE TO UNDERLYING CONDITION WITH MILD NONPROLIFERATIVE RETINOPATHY WITHOUT MACULAR EDEMA, WITH LONG-TERM CURRENT USE OF INSULIN, UNSPECIFIED LATERALITY (HCC): Primary | ICD-10-CM

## 2018-01-18 DIAGNOSIS — I10 ESSENTIAL HYPERTENSION: ICD-10-CM

## 2018-01-18 PROBLEM — E66.01 OBESITY, MORBID (HCC): Status: ACTIVE | Noted: 2018-01-18

## 2018-01-18 LAB — HBA1C MFR BLD HPLC: 9 %

## 2018-01-18 RX ORDER — LOSARTAN POTASSIUM AND HYDROCHLOROTHIAZIDE 25; 100 MG/1; MG/1
1 TABLET ORAL DAILY
Qty: 90 TAB | Refills: 3 | Status: SHIPPED | OUTPATIENT
Start: 2018-01-18 | End: 2018-11-15 | Stop reason: SDUPTHER

## 2018-01-18 RX ORDER — METFORMIN HYDROCHLORIDE 500 MG/1
1000 TABLET, EXTENDED RELEASE ORAL
Qty: 90 TAB | Refills: 1 | Status: SHIPPED | OUTPATIENT
Start: 2018-01-18 | End: 2018-01-18 | Stop reason: SDUPTHER

## 2018-01-18 RX ORDER — METFORMIN HYDROCHLORIDE 500 MG/1
1000 TABLET, EXTENDED RELEASE ORAL
Qty: 60 TAB | Refills: 0 | Status: SHIPPED | OUTPATIENT
Start: 2018-01-18 | End: 2018-02-06 | Stop reason: SDUPTHER

## 2018-01-18 RX ORDER — METFORMIN HYDROCHLORIDE 500 MG/1
1000 TABLET, EXTENDED RELEASE ORAL
Qty: 180 TAB | Refills: 0 | Status: SHIPPED | OUTPATIENT
Start: 2018-01-18 | End: 2018-01-18 | Stop reason: SDUPTHER

## 2018-01-18 RX ORDER — INSULIN LISPRO 100 [IU]/ML
INJECTION, SOLUTION INTRAVENOUS; SUBCUTANEOUS
Qty: 180 ML | Refills: 1
Start: 2018-01-18 | End: 2018-03-09 | Stop reason: SDUPTHER

## 2018-01-18 NOTE — MR AVS SNAPSHOT
[address] [place] [address] [address] [facility] 
[phone] Patient: Abel Low Sr. MRN:  :[id number] Visit Information Date & Time Provider Department Dept. Phone Encounter #  
 2018 10:00 AM Celso Floyd MD [facility] [phone] [id number] Your Appointments 2018 10:50 AM  
New Patient with MD [facility] Appt Note: np get diabetes under control refered by  Jose Pedro [phone] sc 10/11/17  
 [address] [facility] [address] [id number] [address] [facility] Upcoming Health Maintenance Date Due DTaP/Tdap/Td series (1 - Tdap) 10/20/1996 FOOT EXAM Q1 2015 EYE EXAM RETINAL OR DILATED Q1 3/3/2016 HEMOGLOBIN A1C Q6M 4/10/2018 MICROALBUMIN Q1 10/10/2018 LIPID PANEL Q1 10/10/2018 Allergies as of 2018  Review Complete On: 2018 By: Tha Kellogg LPN Severity Noted Reaction Type Reactions Lisinopril  2011    Cough Current Immunizations  Reviewed on 10/10/2017 Name Date Influenza Vaccine (Quad) PF 10/10/2017, 2016, 2014 Influenza Vaccine Split 2011, 10/15/2010 Pneumococcal Polysaccharide (PPSV-23) 3/21/2014 Not reviewed this visit You Were Diagnosed With   
  
 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 
ICD-9-CM: 401.9 Vitals BP Pulse Resp Height(growth percentile) Weight(growth percentile) SpO2 150/85 100 16 5' 9\" (1.753 m) (!) 374 lb (169.6 kg) 98% BMI Smoking Status 55.23 kg/m2 Never Smoker Vitals History BMI and BSA Data Body Mass Index Body Surface Area  
 55.23 kg/m 2 2.87 m 2 Preferred Pharmacy Pharmacy Name Phone [id number] [address] [phone] Your Updated Medication List  
  
   
This list is accurate as of: 1/18/18 10:14 AM.  Always use your most recent med list.  
  
  
  
  
  insulin pump Misc Commonly known as:  PATIENT SUPPLIED  
by Does Not Apply route. aspirin 81 mg tablet Take  by mouth. atorvastatin 10 mg tablet Commonly known as:  LIPITOR  
TAKE 1 TABLET DAILY * Blood-Glucose Meter monitoring kit Commonly known as:  Jorge Bile  
by Does Not Apply route. Test 4 times daily * Blood-Glucose Meter Misc Commonly known as:  Onalee Lizzie IQ METER Use QID  
  
 famotidine 20 mg tablet Commonly known as:  PEPCID Take 1 Tab by mouth two (2) times a day. glucose blood VI test strips strip Commonly known as:  ONETOUCH ULTRA TEST Use QID * insulin lispro 100 unit/mL injection Commonly known as:  HumaLOG  
**waiting on mail order**  
  
 * insulin lispro 100 unit/mL injection Commonly known as:  HumaLOG INJECT IN INSULIN PUMP 12 UNITS PER HOUR PLUS BOLUSES. USE UP TO 5 VIALS EVERY MONTH (OFFICE VISIT REQUESTED)  
  
 losartan-hydroCHLOROthiazide 100-25 mg per tablet Commonly known as:  HYZAAR Take 1 Tab by mouth daily. metFORMIN  mg tablet Commonly known as:  GLUCOPHAGE XR Take 2 Tabs by mouth daily (with dinner) for 360 days. * Notice: This list has 4 medication(s) that are the same as other medications prescribed for you. Read the directions carefully, and ask your doctor or other care provider to review them with you. Prescriptions Sent to Pharmacy Refills losartan-hydroCHLOROthiazide (HYZAAR) 100-25 mg per tablet 3 Sig: Take 1 Tab by mouth daily. Class: Normal  
 Pharmacy: [address] [facility] Ph #: [phone] Route: Oral  
 metFORMIN ER (GLUCOPHAGE XR) 500 mg tablet 0 Sig: Take 2 Tabs by mouth daily (with dinner) for 360 days. Class: Normal  
 Pharmacy: [facility]/pharmacy [address] [facility] [address] Ph #: [phone] Route: Oral  
  
We Performed the Following AMB POC HEMOGLOBIN A1C [28899 CPT(R)] Introducing [facility]! Rowdy Ventura introduces Pudding Media patient portal. Now you can access parts of your medical record, email your doctor's office, and request medication refills online. 1. In your internet browser, go to [email or web address] (formerly [email or web address]). [email or web address] (formerly [email or web address]) 2. Click on the First Time User? Click Here link in the Sign In box. You will see the New Member Sign Up page. 3. Enter your Pudding Media Access Code exactly as it appears below. You will not need to use this code after youve completed the sign-up process. If you do not sign up before the expiration date, you must request a new code. · Pudding Media Access Code: [email or web address] Expires: 4/18/2018 10:14 AM 
 
4. Enter the last four digits of your Social Security Number (xxxx) and Date of Birth (mm/dd/yyyy) as indicated and click Submit. You will be taken to the next sign-up page. 5. Create a Prime Advantaget ID. This will be your Pudding Media login ID and cannot be changed, so think of one that is secure and easy to remember. 6. Create a Pudding Media password. You can change your password at any time. 7. Enter your Password Reset Question and Answer. This can be used at a later time if you forget your password. 8. Enter your e-mail address. You will receive e-mail notification when new information is available in [address] 9. Click Sign Up. You can now view and download portions of your medical record. 10. Click the Download Summary menu link to download a portable copy of your medical information. If you have questions, please visit the Frequently Asked Questions section of the Ctrax website. Remember, Ctrax is NOT to be used for urgent needs. For medical emergencies, dial 911. Now available from your iPhone and Android! Please provide this summary of care documentation to your next provider. Your primary care clinician is listed as Donnell Gonzalez. If you have any questions after today's visit, please call [phone].

## 2018-01-18 NOTE — PROGRESS NOTES
Coordination of Care Questions    1. Have you been to the ER, urgent care clinic since your last visit? No       Hospitalized since your last visit? No    2. Have you seen or consulted any other health care providers outside of the [facility] since your last visit? Include any pap smears or colon screening.  No

## 2018-01-19 NOTE — PROGRESS NOTES
SUBJECTIVE:  43 y.o. male for follow up of diabetes. Diabetic Review of Systems - medication compliance: compliant all of the time, diabetic diet compliance: noncompliant some of the time, home glucose monitoring: is performed regularly, fasting values range 120 to 130, acute symptoms are none. Other symptoms and concerns:weight gain. Needing large amount insulin  up to 100 units   Basal   6 U per hour,  15 to 2 u bolus  Has seen lower readings lately  On pump > 6 years5  New pump for 3 months better readings now  On Insulin  Since 15 years ago   c peptide suggests type 2    Current Outpatient Prescriptions   Medication Sig Dispense Refill    insulin lispro (HUMALOG) 100 unit/mL injection INJECT IN INSULIN PUMP 12 UNITS PER HOUR PLUS BOLUSES. USE UP TO 5 VIALS EVERY MONTH (OFFICE VISIT REQUESTED) 180 mL 1    losartan-hydroCHLOROthiazide (HYZAAR) 100-25 mg per tablet Take 1 Tab by mouth daily. 90 Tab 3    metFORMIN ER (GLUCOPHAGE XR) 500 mg tablet Take 2 Tabs by mouth daily (with dinner) for 360 days. 60 Tab 0    insulin lispro (HUMALOG) 100 unit/mL injection **waiting on mail order** 10 mL 0    atorvastatin (LIPITOR) 10 mg tablet TAKE 1 TABLET DAILY 90 Tab 3    famotidine (PEPCID) 20 mg tablet Take 1 Tab by mouth two (2) times a day. 20 Tab 0    Blood-Glucose Meter (ONE TOUCH VERIO IQ METER) misc Use QID 1 each 0    glucose blood VI test strips (ONE TOUCH ULTRA TEST) strip Use QID 1 Package 11    Blood-Glucose Meter (ONE TOUCH ULTRA 2) monitoring kit by Does Not Apply route. Test 4 times daily 3 Kit 5    Subcutaneous Insulin Pump Misc by Does Not Apply route.  aspirin 81 mg tablet Take  by mouth.        Vitals:    01/18/18 0943 01/18/18 1006   BP: (!) 150/98 150/85   Pulse: 100    Resp: 16    SpO2: 98%    Weight: (!) 374 lb (169.6 kg)    Height: 5' 9\" (1.753 m)      Vitals:    01/18/18 0943 01/18/18 1006   BP: (!) 150/98 150/85   Pulse: 100    Resp: 16    SpO2: 98%    Weight: (!) 374 lb (169.6 kg) Height: 5' 9\" (1.753 m)          OBJECTIVE:  Appearance: alert, well appearing, and in no distress and overweight. Visit Vitals    /85    Pulse 100    Resp 16    Ht 5' 9\" (1.753 m)    Wt (!) 374 lb (169.6 kg)    SpO2 98%    BMI 55.23 kg/m2     Wt Readings from Last 3 Encounters:   01/18/18 (!) 374 lb (169.6 kg)   10/10/17 (!) 362 lb (164.2 kg)   02/03/17 (!) 354 lb (160.6 kg)       Cardiovascular ROS: no chest pain or dyspnea on exertion  Neurological ROS: no TIA or stroke symptoms  General ROS: negative for - chills, fatigue, fever, malaise, night sweats or sleep disturbance  Endocrine ROS: negative for - hair pattern changes, hot flashes, malaise/lethargy, palpitations, polydipsia/polyuria, temperature intolerance or unexpected weight changes  Has not seen endocrine  Fatigue frequent awakenings snoring noted  Exam: heart sounds normal rate, regular rhythm, normal S1, S2, no murmurs, rubs, clicks or gallops, chest clear, no hepatosplenomegaly  Some ankle edema      ASSESSMENT:  Diabetes Mellitus: asymptomatic, poor controlled, no significant medication side effects noted  Suspect type 2 and  Trial alternte meds  [place] a try  Was on metformin at one time  Will restart now to see endo soon  Hypertension borderline  Controlled   edema from obesity  Weight gain from insulin  PLAN:  See orders for this visit as documented in the electronic medical record Issues reviewed with him: referral to Diabetic Education department, diabetic diet discussed in detail, written exchange diet given and low cholesterol diet, weight control and daily exercise discussed. The patient is advised to begin progressive daily aerobic exercise program, follow a low fat, low cholesterol diet and attempt to lose weight.     Results for orders placed or performed in visit on 01/18/18   AMB POC HEMOGLOBIN A1C   Result Value Ref Range    Hemoglobin A1c (POC) 9.0 %             Hypertension fair controlled for age based on guidelines Gloria djust his dose to 100/25 losartan HCTZ        Middle of night readings to look for hypoglycemia     Diagnoses and all orders for this visit:    1. 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 2. Essential hypertension    Other orders  -     insulin lispro (HUMALOG) 100 unit/mL injection; INJECT IN INSULIN PUMP 12 UNITS PER HOUR PLUS BOLUSES. USE UP TO 5 VIALS EVERY MONTH (OFFICE VISIT REQUESTED)  -     losartan-hydroCHLOROthiazide (HYZAAR) 100-25 mg per tablet; Take 1 Tab by mouth daily.         Add exercise    Consider  Metformin and victoza if   Looks like a type 2   So will get c peptide level

## 2018-02-06 ENCOUNTER — OFFICE VISIT (OUTPATIENT)
Dept: ENDOCRINOLOGY | Age: 43
End: 2018-02-06

## 2018-02-06 VITALS
WEIGHT: 315 LBS | DIASTOLIC BLOOD PRESSURE: 76 MMHG | SYSTOLIC BLOOD PRESSURE: 138 MMHG | HEART RATE: 105 BPM | HEIGHT: 69 IN | BODY MASS INDEX: 46.65 KG/M2

## 2018-02-06 DIAGNOSIS — Z79.4 DIABETES MELLITUS DUE TO UNDERLYING CONDITION WITH MILD NONPROLIFERATIVE RETINOPATHY WITHOUT MACULAR EDEMA, WITH LONG-TERM CURRENT USE OF INSULIN, UNSPECIFIED LATERALITY (HCC): Primary | ICD-10-CM

## 2018-02-06 DIAGNOSIS — E78.00 PURE HYPERCHOLESTEROLEMIA: ICD-10-CM

## 2018-02-06 DIAGNOSIS — I10 ESSENTIAL HYPERTENSION: ICD-10-CM

## 2018-02-06 DIAGNOSIS — E08.3299 DIABETES MELLITUS DUE TO UNDERLYING CONDITION WITH MILD NONPROLIFERATIVE RETINOPATHY WITHOUT MACULAR EDEMA, WITH LONG-TERM CURRENT USE OF INSULIN, UNSPECIFIED LATERALITY (HCC): Primary | ICD-10-CM

## 2018-02-06 RX ORDER — METFORMIN HYDROCHLORIDE 500 MG/1
TABLET, EXTENDED RELEASE ORAL
Qty: 360 TAB | Refills: 3 | Status: SHIPPED | OUTPATIENT
Start: 2018-02-06 | End: 2018-05-10 | Stop reason: SDUPTHER

## 2018-02-06 NOTE — MR AVS SNAPSHOT
[address] [facility] [address] [id number] [phone] Patient: Anjana Ge Sr. MRN:  [id number] Visit Information Date & Time Provider Department Dept. Phone Encounter #  
 2018 10:50 AM Saman Almanza MD [facility] [phone] Follow-up Instructions Return in about 4 months (around 2018). Your Appointments 2018  9:00 AM  
ROUTINE CARE with Royce Bullock MD  
Formerly [facility] Appt Note: R F/U  
 [place] [address] [facility] [address]  
[facility] [address] [id number] Upcoming Health Maintenance Date Due DTaP/Tdap/Td series (1 - Tdap) 10/20/1996 FOOT EXAM Q1 2015 EYE EXAM RETINAL OR DILATED Q1 3/3/2016 HEMOGLOBIN A1C Q6M 2018 MICROALBUMIN Q1 10/10/2018 LIPID PANEL Q1 10/10/2018 Allergies as of 2018  Review Complete On: 2018 By: Saman Almanza MD  
  
 Severity Noted Reaction Type Reactions Lisinopril  2011    Cough Current Immunizations  Reviewed on 10/10/2017 Name Date Influenza Vaccine (Quad) PF 10/10/2017, 2016, 2014 Influenza Vaccine Split 2011, 10/15/2010 Pneumococcal Polysaccharide (PPSV-23) 3/21/2014 Not reviewed this visit You Were Diagnosed With   
  
 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  
 54.67 kg/m 2 2.86 m 2 Preferred Pharmacy Pharmacy Name Phone [address] [phone] Your Updated Medication List  
  
   
This list is accurate as of: 2/6/18 12:26 PM.  Always use your most recent med list.  
  
  
  
  
  insulin pump Misc Commonly known as:  PATIENT SUPPLIED  
by Does Not Apply route. aspirin 81 mg tablet Take  by mouth. atorvastatin 10 mg tablet Commonly known as:  LIPITOR  
TAKE 1 TABLET DAILY * Blood-Glucose Meter monitoring kit Commonly known as:  Janet Dominic  
by Does Not Apply route. Test 4 times daily * Blood-Glucose Meter Misc Commonly known as:  [place] Collar IQ METER Use QID  
  
 glucose blood VI test strips strip Commonly known as:  ONETOUCH ULTRA TEST Use QID  
  
 insulin lispro 100 unit/mL injection Commonly known as:  HumaLOG INJECT IN INSULIN PUMP 12 UNITS PER HOUR PLUS BOLUSES. USE UP TO 5 VIALS EVERY MONTH (OFFICE VISIT REQUESTED)  
  
 losartan-hydroCHLOROthiazide 100-25 mg per tablet Commonly known as:  HYZAAR Take 1 Tab by mouth daily. metFORMIN  mg tablet Commonly known as:  GLUCOPHAGE XR Take 2 tabs with breakfast and dinner--keep on file until he needs this sent * Notice: This list has 2 medication(s) that are the same as other medications prescribed for you. Read the directions carefully, and ask your doctor or other care provider to review them with you. Prescriptions Sent to Pharmacy Refills  
 metFORMIN ER (GLUCOPHAGE XR) 500 mg tablet 3 Sig: Take 2 tabs with breakfast and dinner--keep on file until he needs this sent Class: Normal  
 Pharmacy: [address], [address] [place] [address] #: [phone] We Performed the Following  DIABETES FOOT EXAM [[facility]] Follow-up Instructions Return in about 4 months (around 6/6/2018). Patient Instructions 1) You can edit your basal rate as follows: basal>delivery settings>basal pattern setup>basal 1>options>edit>hit the [employer] [place] button and then the rate will be flashing and decrease the rate 7.5 and hit save and done. 2) Increase the metformin to 3 tabs per day either together or split 1 in the morning and 2 with dinner for 1-2 weeks and if tolerating, go up to 2 tabs twice daily. 3) As you increase the metformin, my hope is that we'll be able to decrease your basal rate. If you are having fasting sugars (no food for at least 6 hours) under 100, then plan on decreasing your basal rate by 0.5 units/hr as needed to keep your fasting sugars between 100-130.   
 
4) Feel free to send me a message through [employer] if you have any questions or concerns prior to your next visit. Introducing [facility] SERVICES! Antonio Lopez introduces Gateway 3[employer] patient portal. Now you can access parts of your medical record, email your doctor's office, and request medication refills online. 1. In your internet browser, go to [email or web address] 2. Click on the First Time User? Click Here link in the Sign In box. You will see the New Member Sign Up page. 3. Enter your [employer] Access Code exactly as it appears below. You will not need to use this code after youve completed the sign-up process. If you do not sign up before the expiration date, you must request a new code. · Gateway [employer] Access Code: [email or web address] Expires: 4/18/2018 10:14 AM 
 
4. Enter the last four digits of your Social Security Number (xxxx) and Date of Birth (mm/dd/yyyy) as indicated and click Submit. You will be taken to the next sign-up page. 5. Create a Raynt ID. This will be your Gateway 3D login ID and cannot be changed, so think of one that is secure and easy to remember. 6. Create a [employer] password. You can change your password at any time. 7. Enter your Password Reset Question and Answer. This can be used at a later time if you forget your password. 8. Enter your e-mail address. You will receive e-mail notification when new information is available in [address] 9. Click Sign Up. You can now view and download portions of your medical record. 10. Click the Download Summary menu link to download a portable copy of your medical information. If you have questions, please visit the Frequently Asked Questions section of the [employer] website. Remember, [employer] is NOT to be used for urgent needs. For medical emergencies, dial 911. Now available from your iPhone and Android! Please provide this summary of care documentation to your next provider. Your primary care clinician is listed as Cassandra Hair. If you have any questions after today's visit, please call [phone].

## 2018-02-06 NOTE — PATIENT INSTRUCTIONS
1) You can edit your basal rate as follows: basal>delivery settings>basal pattern setup>basal 1>options>edit>hit the [place] button and then the rate will be flashing and decrease the rate 7.5 and hit save and done. 2) Increase the metformin to 3 tabs per day either together or split 1 in the morning and 2 with dinner for 1-2 weeks and if tolerating, go up to 2 tabs twice daily. 3) As you increase the metformin, my hope is that we'll be able to decrease your basal rate. If you are having fasting sugars (no food for at least 6 hours) under 100, then plan on decreasing your basal rate by 0.5 units/hr as needed to keep your fasting sugars between 100-130.      4) Feel free to send me a message through [employer] if you have any questions or concerns prior to your next visit.

## 2018-02-06 NOTE — PROGRESS NOTES
Chief Complaint   Patient presents with    Diabetes     pcp and pharmacy confirmed    Other     consent form signed to obtain eye report    Diabetic Foot Exam     due     History of Present Illness: Farhat Ellington Sr. is a 43 y.o. male who is a new patient for evaluation of diabetes. Was diagnosed with diabetes around age 32 and has been managed by Dr. Julita Mg the whole time. The majority of time has been on insulin. Did have a c-peptide level of 0.8 in 11/16. Was started on an insulin pump 7-8 years ago. Also was just started on metformin 2 tabs with dinner at his most recent visit in 1/18 and so far is tolerating this. Has found that this is starting to help not need as much insulin for boluses. Current settings are as follows:  - basal: 12a: 8 units/hr  - Carb ratio: 3  - sensitivity: 10  - target: 100-120  - active insulin time: 4    Was just started on the Medtronic 670G pump about a month ago but hasn't yet been trained on the sensor. Changes sites every 3-4 days. 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 Can have lows in the 50-60s once a week while sleeping. May check midway through his shift around 9pm and sees readings of 160-190. May also check around 1am and can be about 180-200. Most recent Hgb A1c was 9% in 1/18 down from 11.2% in 10/17. Thinks his A1c has improved as his old pump may not have been delivering properly in the summer and then has a loaner pump until about a month ago and has been on the new pump since.  A typical day is as follows:  - breakfast: sausage biscuit, occ oatmeal, not much cereal  - lunch: salad, occ sandwich or burger or fries  - dinner: homemade soup with chicken and corn and green beans but usually without noodles and no crackers, baked chicken with rice, occ potato or pasta, no bread other than sandwich, not much dessert  - beverages: water, unsweet tea, may rarely have a soda  - snacks: almonds, pb crackers  Exercise consists of walking on the job and sometimes while shopping with his wife but otherwise no dedicated exercise. No history of vascular disease.  (+) history of retinopathy in the right eye, neuropathy, or nephropathy. Last eye exam was Dec 2017. Past Medical History:   Diagnosis Date    DM (diabetes mellitus) ([address] Age 29    HLD (hyperlipidaemia)     HTN      Past Surgical History:   Procedure Laterality Date    HX ORTHOPAEDIC Left 1998    pins in toes of foot    HX REFRACTIVE SURGERY       Current Outpatient Prescriptions   Medication Sig    insulin lispro (HUMALOG) 100 unit/mL injection INJECT IN INSULIN PUMP 12 UNITS PER HOUR PLUS BOLUSES. USE UP TO 5 VIALS EVERY MONTH (OFFICE VISIT REQUESTED)    losartan-hydroCHLOROthiazide (HYZAAR) 100-25 mg per tablet Take 1 Tab by mouth daily.  metFORMIN ER (GLUCOPHAGE XR) 500 mg tablet Take 2 Tabs by mouth daily (with dinner) for 360 days.  atorvastatin (LIPITOR) 10 mg tablet TAKE 1 TABLET DAILY    Blood-Glucose Meter (ONE TOUCH VERIO IQ METER) misc Use QID    glucose blood VI test strips (ONE TOUCH ULTRA TEST) strip Use QID    Blood-Glucose Meter (ONE TOUCH ULTRA 2) monitoring kit by Does Not Apply route. Test 4 times daily    Subcutaneous Insulin Pump Misc by Does Not Apply route.  aspirin 81 mg tablet Take  by mouth. No current facility-administered medications for this visit. Allergies   Allergen Reactions    Lisinopril Cough     Family History   Problem Relation Age of Onset    Diabetes Mother     Heart Attack Father 61    Diabetes Maternal Grandmother     Diabetes Maternal Grandfather     Diabetes Paternal Grandmother     Diabetes Paternal Grandfather      Social History     Social History    Marital status:      Spouse name: N/A    Number of children: N/A    Years of education: N/A     Occupational History    Not on file.      Social History Main Topics    Smoking status: Never Smoker    Smokeless tobacco: Never Used    Alcohol use 0.0 oz/week     0 Standard drinks or equivalent per week      Comment: glass of wine 1-2 times a month    Drug use: No    Sexual activity: Yes     Partners: Female     Other Topics Concern    Not on file     Social History Narrative    Lives in [address] [place] [address] with wife and 24 yo son, 15 yo son, and 7 yo daughter. Works as a  for [employer]. Likes to fish. Review of Systems:  - Constitutional Symptoms: no fevers, chills, (+) 20 lb weight gain since 1/17 but has lost 4 lbs since visit with PCP in 1/18  - Eyes: no blurry vision or double vision  - Cardiovascular: no chest pain or palpitations  - Respiratory: no cough or shortness of breath  - Gastrointestinal: no dysphagia or abdominal pain  - Musculoskeletal: no joint pains or weakness  - Integumentary: no rashes  - Neurological: no numbness, tingling, or headaches  - Psychiatric: no depression or anxiety  - Endocrine: no heat or cold intolerance, no polyuria or polydipsia    Physical Examination:  Blood pressure 138/76, pulse (!) 105, height 5' 9\" (1.753 m), weight (!) 370 lb 3.2 oz (167.9 kg).   - General: pleasant, no distress, good eye contact  - HEENT: no exopthalmos, no periorbital edema, no scleral/conjunctival injection, EOMI, no lid lag or stare  - Neck: supple, no thyromegaly, masses, lymph nodes, or carotid bruits, no supraclavicular or dorsocervical fat pads  - Cardiovascular: regular, normal rate, normal S1 and S2, no murmurs/rubs/gallops,  - Respiratory: clear to auscultation bilaterally  - Gastrointestinal: soft, nontender, nondistended, no masses, no hepatosplenomegaly  - Musculoskeletal: no proximal muscle weakness in upper or lower extremities  - Integumentary: (+) acanthosis nigricans, no abdominal striae, no rashes, no edema, no foot ulcers  - Neurological: see foot exam  - Psychiatric: normal mood and affect         Diabetic foot exam performed by Andrey Powell MD     Measurement  Response Nurse Comment Physician Comment   Monofilament  R - reduced sensation with micro filament  L - reduced sensation with micro filament     Pulse DP R - 2+ (normal)  L - 2+ (normal)     Vibration R - decreased  L - decreased     Structural deformity R - None  L - None     Skin Integrity / Deformity R - Mild - callus  L - Mild - callus        Reviewed by:               Data Reviewed:   Component      Latest Ref Rng & Units 10/10/2017 10/10/2017 10/10/2017 10/10/2017           9:26 AM  9:26 AM  9:26 AM  8:48 AM   Glucose      65 - 99 mg/dL  92     BUN      6 - 24 mg/dL  12     Creatinine      0.76 - 1.27 mg/dL  0.93     GFR est non-AA      >59 mL/min/1.73  102     GFR est AA      >59 mL/min/1.73  117     BUN/Creatinine ratio      9 - 20  13     Sodium      134 - 144 mmol/L  139     Potassium      3.5 - 5.2 mmol/L  3.6     Chloride      96 - 106 mmol/L  97     CO2      18 - 29 mmol/L  25     Calcium      8.7 - 10.2 mg/dL  8.8     Protein, total      6.0 - 8.5 g/dL  7.3     Albumin      3.5 - 5.5 g/dL  4.3     GLOBULIN, TOTAL      1.5 - 4.5 g/dL  3.0     A-G Ratio      1.2 - 2.2  1.4     Bilirubin, total      0.0 - 1.2 mg/dL  0.8     Alk. phosphatase      39 - 117 IU/L  45     AST      0 - 40 IU/L  34     ALT (SGPT)      0 - 44 IU/L  45 (H)     Cholesterol, total      100 - 199 mg/dL   95 (L)    Triglyceride      0 - 149 mg/dL   120    HDL Cholesterol      >39 mg/dL   32 (L)    VLDL, calculated      5 - 40 mg/dL   24    LDL, calculated      0 - 99 mg/dL   39    Creatinine, urine      Not Estab. mg/dL 66.6      Microalbumin, urine      Not Estab. ug/mL 4.0      Microalbumin/Creat. Ratio      0.0 - 30.0 mg/g creat 6.0      Hemoglobin A1c (POC)      %    11.2     Component      Latest Ref Rng & Units 1/18/2018          10:37 AM   Hemoglobin A1c (POC)      % 9.0       Assessment/Plan:   1.  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 He appears to have Type 1 diabetes not type 2 given his c-peptide was 0.8 in 11/16. However, given his weight, I agree with metformin to help with insulin resistance and will try to titrate up his dose to allow him to cut back on his basal rate. - increase metformin  mg to 1 tab in am and 2 tabs in pm x 1-2 weeks then 2 tabs bid  - cont current pump settings aside from decreasing basal rate to 7.5 units/hr  - check bs 3 times per day  - foot exam done 2/18  - eye exam 12/17--will obtain report  - microalbumin nl 10/17  - check A1c and cmp prior to next visit      2. Essential hypertension: his BP was at goal < 140/90  -  cont current regimen for now      3. Pure hypercholesterolemia: LDL 39 in 10/17 on atorvastatin 10 mg daily  - cont lipitor 10 mg daily  - check lipids prior to next visit        Patient Instructions   1) You can edit your basal rate as follows: basal>delivery settings>basal pattern setup>basal 1>options>edit>hit the [place] button and then the rate will be flashing and decrease the rate 7.5 and hit save and done. 2) Increase the metformin to 3 tabs per day either together or split 1 in the morning and 2 with dinner for 1-2 weeks and if tolerating, go up to 2 tabs twice daily. 3) As you increase the metformin, my hope is that we'll be able to decrease your basal rate. If you are having fasting sugars (no food for at least 6 hours) under 100, then plan on decreasing your basal rate by 0.5 units/hr as needed to keep your fasting sugars between 100-130.      4) Feel free to send me a message through [employer] if you have any questions or concerns prior to your next visit. Follow-up Disposition:  Return in about 4 months (around 6/6/2018).     Copy sent to:  Eric Jenkins MD

## 2018-02-28 ENCOUNTER — TELEPHONE (OUTPATIENT)
Dept: ENDOCRINOLOGY | Age: 43
End: 2018-02-28

## 2018-02-28 DIAGNOSIS — E08.3299 DIABETES MELLITUS DUE TO UNDERLYING CONDITION WITH MILD NONPROLIFERATIVE RETINOPATHY WITHOUT MACULAR EDEMA, WITH LONG-TERM CURRENT USE OF INSULIN, UNSPECIFIED LATERALITY (HCC): Primary | ICD-10-CM

## 2018-02-28 DIAGNOSIS — Z79.4 DIABETES MELLITUS DUE TO UNDERLYING CONDITION WITH MILD NONPROLIFERATIVE RETINOPATHY WITHOUT MACULAR EDEMA, WITH LONG-TERM CURRENT USE OF INSULIN, UNSPECIFIED LATERALITY (HCC): Primary | ICD-10-CM

## 2018-02-28 NOTE — TELEPHONE ENCOUNTER
----- Message from Dyana Curry sent at 2/27/2018  1:54 PM EST -----  Regarding: need diabetes education order  Mr. Lita Nelson is scheduled for sensor training on 3/5/18 at 1:00pm.  Please put a diabetes education order into connect care for him.     Thank you,  [phone]

## 2018-03-05 ENCOUNTER — HOSPITAL ENCOUNTER (OUTPATIENT)
Dept: DIABETES SERVICES | Age: 43
Discharge: HOME | End: 2018-03-05

## 2018-03-07 NOTE — DIABETES MGMT
DTC Progress Note    Recommendations/ Comments:   1) pt was advised during manual mode use with his 670G system, that if his high alert contributes to alarm fatigue and then to ignoring alerts, to increase his high alert to 280 mg/dl. He agrees. 2) instructed pt to set up his [address] account and notify  when he has established his user and password to link and access his downloads. He was instructed to attempt download in ~7 days and the >24 hours prior to his 3/23/18 appointment for Automode start. 3) he was advised and verbalizes understanding of procedure for Automode start and follow-up visits. Chart reviewed on Masha Plump Sr. and visited with pt today - completed health assessment for his DM and pump therapy history. Patient is a 43 y.o. male with known DM on Medtronic 670G insulin pump and was started by Gabriela Sullivan with [employer]. Pt is here today to start his Guardian 3 sensor system. Pt shared that he last used sensor therapy with the Realtime Sensor >6 years ago and found it to be unreliable with it's accuracy. We discussed differences with the new system compared to older system. Advised pt that we will likely be changing his active insulin setting towards 3 hours and will assess his carb ratio for needed adjustments pending his Carelink download. Pt required minimal assistance with use of sensor , insertion technique and appropriate application of overtape for the system to work. Discussed site location compared to pump infusion set. Discussed calibration needs along w/ diagnostic calibrations versus BG required prompts. Reinforced to make sure taping per instruction    Per guidelines for 670G system, we started pt on the following settings for his high and low alerts:  High alert: 250 mg/dl with Snooze at 2 hours; Low alert 80 mg/dl with Snooze at 20 minutes.        Current insulin pump settings:   Basal: 8 units/hour  Insulin to carb ratio:  1 unit: 3 g   Sensitivity:  1 unit: 10 mg/dl  BG target:  100-120 mg/dl   Active insulin 4 hours.      A1c:   Lab Results   Component Value Date/Time    Hemoglobin A1c 12.1 (H) 12/09/2011 04:21 PM    Hemoglobin A1c 7.8 (H) 02/11/2011 01:47 PM   Pt recent A1c  with  was 9% 1/18/18 this is down from 11.2% 10/10/17      Lab Results   Component Value Date/Time    Creatinine 0.93 10/10/2017 09:26 AM     Roman EDMONDSON

## 2018-03-09 RX ORDER — INSULIN LISPRO 100 [IU]/ML
INJECTION, SOLUTION INTRAVENOUS; SUBCUTANEOUS
Qty: 190 ML | Refills: 1 | Status: SHIPPED | OUTPATIENT
Start: 2018-03-09 | End: 2018-07-10 | Stop reason: SDUPTHER

## 2018-03-09 RX ORDER — INSULIN LISPRO 100 [IU]/ML
INJECTION, SOLUTION INTRAVENOUS; SUBCUTANEOUS
Qty: 1 VIAL | Refills: 0 | Status: SHIPPED | OUTPATIENT
Start: 2018-03-09 | End: 2018-07-05 | Stop reason: SDUPTHER

## 2018-03-09 NOTE — TELEPHONE ENCOUNTER
Patient is always short a vile. He would like to know if you can up the qty one more to make a complete 90 day supply. Delgado Rodriges would also like to pickup 1 vile to last until Rx come from [facility] on [address].   [phone]

## 2018-03-21 ENCOUNTER — HOSPITAL ENCOUNTER (OUTPATIENT)
Dept: DIABETES SERVICES | Age: 43
Discharge: HOME | End: 2018-03-21

## 2018-03-21 NOTE — DIABETES MGMT
DTC Progress Note    Recommendations/ Comments  1) Pt to attempt to enter Automode - if problems, reach out via contact options provided. 2) Attempt to get [facility] Personal up and running - email user name and pw if successful so clinic and personal can be linked and then will complete f/u virtually. 3) If not able, we agree to complete remainder of Automode f/u visits in person. Received email update from pt this am - is now running Automode. Patient is a 43 y.o. male with DM on Medtronic 670G insulin pump. Pt is seen for Automode start today. Pt had problems with setting up [address] and therefore, he was downloaded at [facility]. He reports decreasing his basal rate to 6.0 unit/hour and that he is no longer having hypoglycemia as a result. Due to hypoglycemia, alert before low and low settings were problematic and annoying - we decreased his low alert to 70 mg/dl as he enters Automode option. Due to hyperglycemia trend to evening (i.e. pt's am and work day), we made slight increase to his basal setting to start at 5:00pm to 12 am to 6.1 unit/hour. Due to his work schedule, reviewed his reports for insulin to carb ratio adjustments - currently appear to be okay but may need adjustment for overnight (10 pm to 6 pm) which is patient's daytime to 1 unit:3.25g as he is having some correctional coverage from his food bolusing during this segment. We decreased his active insulin time to 3.5 hours from 4 as consistent with adjustments for Automode and will assess to continue to transition towards 3 hours for active insulin setting. He is only calibrating when prompted by system and agrees to increase frequency of calibration to 3/24 hours. Reviewed optimal times to calibrate and when not to use BG for calibration with the system - \"BG required\". Radha Acosta agrees that this is an easy fix.      As we attempted to enter \"BG required\" prompt was needed, and pt did not bring meter with him today - therefore, he will enter Automode at home when he is able to complete test with his Cont[employer] Link meter.   We reviewed all settings and modes per training checklist.  Pt is to email educator if he is not able to enter Automode independently and we will troubleshoot over phone this am.        A1c:   Lab Results   Component Value Date/Time    Hemoglobin A1c 12.1 (H) 12/09/2011 04:21 PM    Hemoglobin A1c 7.8 (H) 02/11/2011 01:47 PM     Lab Results   Component Value Date/Time    Creatinine 0.93 10/10/2017 09:26 AM     Thank you  Ge Alcantar RD CDE

## 2018-05-07 ENCOUNTER — TELEPHONE (OUTPATIENT)
Dept: ENDOCRINOLOGY | Age: 43
End: 2018-05-07

## 2018-05-07 NOTE — TELEPHONE ENCOUNTER
----- Message from Cele Santos sent at 5/7/2018  9:20 AM EDT -----  Regarding: /Telephone  Pt [facility] medication metformin. Pt best contact number is [phone].

## 2018-05-10 RX ORDER — METFORMIN HYDROCHLORIDE 500 MG/1
TABLET, EXTENDED RELEASE ORAL
Qty: 360 TAB | Refills: 3 | Status: SHIPPED | OUTPATIENT
Start: 2018-05-10 | End: 2019-02-19

## 2018-05-10 NOTE — TELEPHONE ENCOUNTER
Please let him know that I went ahead and [facility] even though I had already sent one in 2/18 telling them to keep the prescription on file. Please let me know if they still don't fill this.

## 2018-07-05 RX ORDER — INSULIN LISPRO 100 [IU]/ML
INJECTION, SOLUTION INTRAVENOUS; SUBCUTANEOUS
Qty: 1 VIAL | Refills: 0
Start: 2018-07-05 | End: 2018-07-10 | Stop reason: SDUPTHER

## 2018-07-06 ENCOUNTER — TELEPHONE (OUTPATIENT)
Dept: INTERNAL MEDICINE CLINIC | Age: 43
End: 2018-07-06

## 2018-07-06 NOTE — TELEPHONE ENCOUNTER
Spoke with patient. Advised patient that medication has been sent to pharmacy.  Patient verbalized understanding

## 2018-07-09 ENCOUNTER — TELEPHONE (OUTPATIENT)
Dept: INTERNAL MEDICINE CLINIC | Age: 43
End: 2018-07-09

## 2018-07-10 RX ORDER — INSULIN LISPRO 100 [IU]/ML
INJECTION, SOLUTION INTRAVENOUS; SUBCUTANEOUS
Qty: 23 VIAL | Refills: 3 | Status: SHIPPED | OUTPATIENT
Start: 2018-07-10 | End: 2018-07-11 | Stop reason: SDUPTHER

## 2018-07-10 NOTE — TELEPHONE ENCOUNTER
Received a fax from [employer] [address] that was forwarded from Dr. Rebecca Obrien office about clarifying his humalog for his pump. I [facility].

## 2018-07-11 ENCOUNTER — OFFICE VISIT (OUTPATIENT)
Dept: INTERNAL MEDICINE CLINIC | Age: 43
End: 2018-07-11

## 2018-07-11 VITALS
DIASTOLIC BLOOD PRESSURE: 81 MMHG | WEIGHT: 315 LBS | OXYGEN SATURATION: 98 % | TEMPERATURE: 98.4 F | SYSTOLIC BLOOD PRESSURE: 120 MMHG | RESPIRATION RATE: 12 BRPM | HEART RATE: 103 BPM | HEIGHT: 69 IN | BODY MASS INDEX: 46.65 KG/M2

## 2018-07-11 DIAGNOSIS — E66.01 OBESITY, MORBID (HCC): ICD-10-CM

## 2018-07-11 DIAGNOSIS — E11.8 CONTROLLED DIABETES MELLITUS TYPE 2 WITH COMPLICATIONS, UNSPECIFIED WHETHER LONG TERM INSULIN USE (HCC): Primary | ICD-10-CM

## 2018-07-11 LAB — HBA1C MFR BLD HPLC: 7.7 %

## 2018-07-11 RX ORDER — INSULIN LISPRO 100 [IU]/ML
INJECTION, SOLUTION INTRAVENOUS; SUBCUTANEOUS
Qty: 3 VIAL | Refills: 0
Start: 2018-07-11 | End: 2019-08-20 | Stop reason: SDUPTHER

## 2018-07-11 RX ORDER — INSULIN LISPRO 100 [IU]/ML
INJECTION, SOLUTION INTRAVENOUS; SUBCUTANEOUS
Qty: 23 VIAL | Refills: 3
Start: 2018-07-11 | End: 2018-07-11 | Stop reason: SDUPTHER

## 2018-07-11 NOTE — MR AVS SNAPSHOT
[address] [place] [address] [address] [facility] 
[phone] Patient: Nan Falcon Sr. MRN:  [id number] Visit Information Date & Time Provider Department Dept. Phone Encounter #  
 2018  2:00 PM Mehul Sierra Earle9 [facility] [phone] [id number] Your Appointments 2018  9:00 AM  
ROUTINE CARE with Mehul Sierra MD  
[facility] Appt Note: R F/U  
 [place] [address] [place] [address]  
[phone]  
  
   
 [address] [address]  
  
    
 10/19/2018  2:50 PM  
Follow Up with Dipak Gamble MD  
[facility]) Appt Note: 4 month f/u; f/u Diabetes [phone] [address] [facility] Upcoming Health Maintenance Date Due DTaP/Tdap/Td series (1 - Tdap) 10/20/1996 HEMOGLOBIN A1C Q6M 2018 Influenza Age 5 to Adult 2018 MICROALBUMIN Q1 10/10/2018 LIPID PANEL Q1 10/10/2018 EYE EXAM RETINAL OR DILATED Q1 2018 FOOT EXAM Q1 2019 Allergies as of 2018  Review Complete On: 2018 By: Mortimer Davis Rosalynn Robes Severity Noted Reaction Type Reactions Lisinopril  2011    Cough Current Immunizations  Reviewed on 10/10/2017 Name Date Influenza Vaccine (Quad) PF 10/10/2017, 2016, 2014 Influenza Vaccine Split 2011, 10/15/2010 Pneumococcal Polysaccharide (PPSV-23) 3/21/2014 Not reviewed this visit You Were Diagnosed With   
  
 Codes Comments Controlled diabetes mellitus type 2 with complications, unspecified whether long term insulin use (HCC)    -  Primary ICD-10-CM: E11.8 ICD-9-CM: 250.90 Obesity, morbid ([facility]     ICD-10-CM: E66.01 
ICD-9-CM: 278.01 Vitals BP Pulse Temp Resp Height(growth percentile) Weight(growth percentile) 120/81 (BP 1 Location: Right arm, BP Patient Position: Sitting) (!) 103 98.4 °F (36.9 °C) (Oral) 12 5' 9\" (1.753 m) (!) 364 lb (165.1 kg) SpO2 BMI Smoking Status 98% 53.75 kg/m2 Never Smoker BMI and BSA Data Body Mass Index Body Surface Area 53.75 kg/m 2 2.84 m 2 Preferred Pharmacy Pharmacy Name Phone [facility] [place], [facility] [phone] Your Updated Medication List  
  
   
This list is accurate as of 7/11/18  2:44 PM.  Always use your most recent med list.  
  
  
  
  
  insulin pump Misc Commonly known as:  PATIENT SUPPLIED  
by Does Not Apply route. aspirin 81 mg tablet Take  by mouth. atorvastatin 10 mg tablet Commonly known as:  LIPITOR  
TAKE 1 TABLET DAILY * Blood-Glucose Meter monitoring kit Commonly known as:  Valerio Squire  
by Does Not Apply route. Test 4 times daily * Blood-Glucose Meter Misc Commonly known as:  [employer] IQ METER Use QID  
  
 glucose blood VI test strips strip Commonly known as:  ONETOUCH ULTRA TEST Use QID  
  
 insulin lispro 100 unit/mL injection Commonly known as:  HUMALOG Use as directed for insulin pump up to 250 units per day  
  
 losartan-hydroCHLOROthiazide 100-25 mg per tablet Commonly known as:  HYZAAR Take 1 Tab by mouth daily. metFORMIN  mg tablet Commonly known as:  GLUCOPHAGE XR Take 2 tabs with breakfast and dinner * Notice: This list has 2 medication(s) that are the same as other medications prescribed for you. Read the directions carefully, and ask your doctor or other care provider to review them with you. We Performed the Following AMB POC HEMOGLOBIN A1C [59566 CPT(R)] Introducing [facility] SERVICES! [facility] introduces [employer] patient portal. Now you can access parts of your medical record, email your doctor's office, and request medication refills online. 1. In your internet browser, go to [email or web address] 2. Click on the First Time User? Click Here link in the Sign In box. You will see the New Member Sign Up page. 3. Enter your [employer] Access Code exactly as it appears below. You will not need to use this code after youve completed the sign-up process. If you do not sign up before the expiration date, you must request a new code. · [employer] Access Code: [facility] Expires: 10/9/2018  2:44 PM 
 
4. Enter the last four digits of your Social Security Number (xxxx) and Date of Birth (mm/dd/yyyy) as indicated and click Submit. You will be taken to the next sign-up page. 5. Create a [employer] ID. This will be your [employer] login ID and cannot be changed, so think of one that is secure and easy to remember. 6. Create a [employer] password. You can change your password at any time. 7. Enter your Password Reset Question and Answer. This can be used at a later time if you forget your password. 8. Enter your e-mail address. You will receive e-mail notification when new information is available in [address] 9. Click Sign Up. You can now view and download portions of your medical record. 10. Click the Download Summary menu link to download a portable copy of your medical information. If you have questions, please visit the Frequently Asked Questions section of the [employer] website. Remember, [employer] is NOT to be used for urgent needs. For medical emergencies, dial 911. Now available from your iPhone and Android! Please provide this summary of care documentation to your next provider. Your primary care clinician is listed as Masha Alberts. If you have any questions after today's visit, please call [phone].

## 2018-07-11 NOTE — PROGRESS NOTES
SUBJECTIVE:  43 y.o. male for follow up of diabetes. Diabetic Review of Systems - medication compliance: compliant all of the time, diabetic diet compliance: noncompliant some of the time, home glucose monitoring: is performed regularly, fasting values range 120 to 130, acute symptoms are none. Other symptoms and concerns:weight gain. Needing large amount insulin  up to 100 units   Basal   6 U per hour,  15 to 2 u bolus  Has seen lower readings lately  On pump > 6 years5  New pump for 3 months better readings now  On Insulin  Since 15 years ago   c peptide suggests type 2on metformn now saw endo and fels glucose better    Current Outpatient Prescriptions   Medication Sig Dispense Refill    insulin lispro (HUMALOG) 100 unit/mL injection Use as directed for insulin pump up to 250 units per day 23 Vial 3    glucose blood VI test strips (ONETOUCH ULTRA TEST) strip Use  Strip 3    metFORMIN ER (GLUCOPHAGE XR) 500 mg tablet Take 2 tabs with breakfast and dinner 360 Tab 3    losartan-hydroCHLOROthiazide (HYZAAR) 100-25 mg per tablet Take 1 Tab by mouth daily. 90 Tab 3    atorvastatin (LIPITOR) 10 mg tablet TAKE 1 TABLET DAILY 90 Tab 3    Blood-Glucose Meter (ONE TOUCH VERIO IQ METER) misc Use QID 1 each 0    Blood-Glucose Meter (ONE TOUCH ULTRA 2) monitoring kit by Does Not Apply route. Test 4 times daily 3 Kit 5    Subcutaneous Insulin Pump Misc by Does Not Apply route.  aspirin 81 mg tablet Take  by mouth. Vitals:    07/11/18 1414   BP: 120/81   Pulse: (!) 103   Resp: 12   Temp: 98.4 °F (36.9 °C)   TempSrc: Oral   SpO2: 98%   Weight: (!) 364 lb (165.1 kg)   Height: 5' 9\" (1.753 m)     Vitals:    07/11/18 1414   BP: 120/81   Pulse: (!) 103   Resp: 12   Temp: 98.4 °F (36.9 °C)   TempSrc: Oral   SpO2: 98%   Weight: (!) 364 lb (165.1 kg)   Height: 5' 9\" (1.753 m)         OBJECTIVE:  Appearance: alert, well appearing, and in no distress and overweight.   Visit Vitals    /81 (BP 1 Location: Right arm, BP Patient Position: Sitting)    Pulse (!) 103    Temp 98.4 °F (36.9 °C) (Oral)    Resp 12    Ht 5' 9\" (1.753 m)    Wt (!) 364 lb (165.1 kg)    SpO2 98%    BMI 53.75 kg/m2     Wt Readings from Last 3 Encounters:   07/11/18 (!) 364 lb (165.1 kg)   02/06/18 (!) 370 lb 3.2 oz (167.9 kg)   01/18/18 (!) 374 lb (169.6 kg)       Cardiovascular ROS: no chest pain or dyspnea on exertion  Neurological ROS: no TIA or stroke symptoms  General ROS: negative for - chills, fatigue, fever, malaise, night sweats or sleep disturbance  Endocrine ROS: negative for - hair pattern changes, hot flashes, malaise/lethargy, palpitations, polydipsia/polyuria, temperature intolerance or unexpected weight changes  Has not seen endocrine  Fatigue frequent awakenings snoring noted  Exam: heart sounds normal rate, regular rhythm, normal S1, S2, no murmurs, rubs, clicks or gallops, chest clear, no hepatosplenomegaly        ASSESSMENT:  Diabetes Mellitus: asymptomatic,better. Results for orders placed or performed in visit on 07/11/18   AMB POC HEMOGLOBIN A1C   Result Value Ref Range    Hemoglobin A1c (POC) 7.7 %         Suspect type 2 and  Trial alternte meds  [place] a try    Hypertension  Controlled   edema from obesity  Weight gain from insulin  PLAN:  See orders for this visit as documented in the electronic medical record Issues reviewed with him: referral to Diabetic Education department, diabetic diet discussed in detail, written exchange diet given and low cholesterol diet, weight control and daily exercise discussed. The patient is advised to begin progressive daily aerobic exercise program, follow a low fat, low cholesterol diet and attempt to lose weight.     Results for orders placed or performed in visit on 01/18/18   AMB POC HEMOGLOBIN A1C   Result Value Ref Range    Hemoglobin A1c (POC) 9.0 %             Hypertension fair controlled for age based on guidelines  Gloria fosterust his dose to 100/25 losartan HCTZ        Middle of night readings to look for hypoglycemia     Diagnoses and all orders for this visit:    1.  Controlled diabetes mellitus type 2 with complications, unspecified whether long term insulin use (HCC)  -     AMB POC HEMOGLOBIN A1C    Other orders  -     insulin lispro (HUMALOG) 100 unit/mL injection; Use as directed for insulin pump up to 250 units per day

## 2018-07-11 NOTE — PROGRESS NOTES
1. Have you been to the ER, urgent care clinic since your last visit? Hospitalized since your last visit? No    2. Have you seen or consulted any other health care providers outside of the [address] [place] [address] since your last visit? Include any pap smears or colon screening.  No     Chief Complaint   Patient presents with    Medication Refill

## 2018-10-07 RX ORDER — ATORVASTATIN CALCIUM 10 MG/1
TABLET, FILM COATED ORAL
Qty: 90 TAB | Refills: 3 | Status: SHIPPED | OUTPATIENT
Start: 2018-10-07 | End: 2018-11-15 | Stop reason: SDUPTHER

## 2018-11-15 ENCOUNTER — OFFICE VISIT (OUTPATIENT)
Dept: INTERNAL MEDICINE CLINIC | Age: 43
End: 2018-11-15

## 2018-11-15 VITALS
DIASTOLIC BLOOD PRESSURE: 80 MMHG | WEIGHT: 315 LBS | BODY MASS INDEX: 46.65 KG/M2 | OXYGEN SATURATION: 100 % | HEIGHT: 69 IN | TEMPERATURE: 97.9 F | RESPIRATION RATE: 18 BRPM | SYSTOLIC BLOOD PRESSURE: 153 MMHG | HEART RATE: 97 BPM

## 2018-11-15 DIAGNOSIS — Z23 ENCOUNTER FOR IMMUNIZATION: Primary | ICD-10-CM

## 2018-11-15 DIAGNOSIS — E08.3299 DIABETES MELLITUS DUE TO UNDERLYING CONDITION WITH MILD NONPROLIFERATIVE RETINOPATHY WITHOUT MACULAR EDEMA, WITH LONG-TERM CURRENT USE OF INSULIN, UNSPECIFIED LATERALITY (HCC): ICD-10-CM

## 2018-11-15 DIAGNOSIS — I10 ESSENTIAL HYPERTENSION: ICD-10-CM

## 2018-11-15 DIAGNOSIS — Z79.4 DIABETES MELLITUS DUE TO UNDERLYING CONDITION WITH MILD NONPROLIFERATIVE RETINOPATHY WITHOUT MACULAR EDEMA, WITH LONG-TERM CURRENT USE OF INSULIN, UNSPECIFIED LATERALITY (HCC): ICD-10-CM

## 2018-11-15 RX ORDER — ATORVASTATIN CALCIUM 10 MG/1
10 TABLET, FILM COATED ORAL DAILY
Qty: 90 TAB | Refills: 3 | Status: SHIPPED | OUTPATIENT
Start: 2018-11-15 | End: 2019-10-24

## 2018-11-15 RX ORDER — LOSARTAN POTASSIUM AND HYDROCHLOROTHIAZIDE 25; 100 MG/1; MG/1
1 TABLET ORAL DAILY
Qty: 90 TAB | Refills: 3 | Status: SHIPPED | OUTPATIENT
Start: 2018-11-15 | End: 2019-02-19 | Stop reason: ALTCHOICE

## 2018-11-15 NOTE — PROGRESS NOTES
1. Have you been to the ER, urgent care clinic since your last visit? Hospitalized since your last visit? No    2. Have you seen or consulted any other health care providers outside of the [address] [place] [address] since your last visit? Include any pap smears or colon screening.  No

## 2018-11-15 NOTE — PROGRESS NOTES
Chief Complaint   Patient presents with    Diabetes    Hypertension    Cholesterol Problem     SUBJECTIVE:  37 y.o. male for follow up of diabetes. Diabetic Review of Systems - medication compliance: compliant all of the time, diabetic diet compliance: noncompliant some of the time, home glucose monitoring: is performed regularly, fasting values range 120 to 130, acute symptoms are none. Other symptoms and concerns:weight gain. Needing large amount insulin  up to 100 units   Basal   6 U per hour,  15 to 2 u bolus  Has seen lower readings lately  On pump > 6 years5  New pump for 7 months better readings now  On Insulin  Since 15 years ago   c peptide suggests type 2on metformn now saw endo and fels glucose better    Current Outpatient Medications   Medication Sig Dispense Refill    losartan-hydroCHLOROthiazide (HYZAAR) 100-25 mg per tablet Take 1 Tab by mouth daily. 90 Tab 3    atorvastatin (LIPITOR) 10 mg tablet Take 1 Tab by mouth daily. 90 Tab 3    insulin lispro (HUMALOG) 100 unit/mL injection Use as directed for insulin pump up to 200 units per day 3 Vial 0    glucose blood VI test strips (ONETOUCH ULTRA TEST) strip Use  Strip 3    metFORMIN ER (GLUCOPHAGE XR) 500 mg tablet Take 2 tabs with breakfast and dinner 360 Tab 3    Blood-Glucose Meter (ONE TOUCH VERIO IQ METER) misc Use QID 1 each 0    Blood-Glucose Meter (ONE TOUCH ULTRA 2) monitoring kit by Does Not Apply route. Test 4 times daily 3 Kit 5    Subcutaneous Insulin Pump Misc by Does Not Apply route.  aspirin 81 mg tablet Take  by mouth.        Vitals:    11/15/18 0818 11/15/18 0850   BP: (!) 170/94 153/80   Pulse: 97    Resp: 18    Temp: 97.9 °F (36.6 °C)    TempSrc: Oral    SpO2: 100%    Weight: (!) 380 lb (172.4 kg)    Height: 5' 9\" (1.753 m)      Vitals:    11/15/18 0818 11/15/18 0850   BP: (!) 170/94 153/80   Pulse: 97    Resp: 18    Temp: 97.9 °F (36.6 °C)    TempSrc: Oral    SpO2: 100%    Weight: (!) 380 lb (172.4 kg) Height: 5' 9\" (1.753 m)          OBJECTIVE:  Appearance: alert, well appearing, and in no distress and overweight. Visit Vitals  /80   Pulse 97   Temp 97.9 °F (36.6 °C) (Oral)   Resp 18   Ht 5' 9\" (1.753 m)   Wt (!) 380 lb (172.4 kg)   SpO2 100%   BMI 56.12 kg/m²     Wt Readings from Last 3 Encounters:   11/15/18 (!) 380 lb (172.4 kg)   07/11/18 (!) 364 lb (165.1 kg)   02/06/18 (!) 370 lb 3.2 oz (167.9 kg)       Cardiovascular ROS: no chest pain or dyspnea on exertion  Neurological ROS: no TIA or stroke symptoms  General ROS: negative for - chills, fatigue, fever, malaise, night sweats or sleep disturbance  Endocrine ROS: negative for - hair pattern changes, hot flashes, malaise/lethargy, palpitations, polydipsia/polyuria, temperature intolerance or unexpected weight changes  Has not seen endocrine  Fatigue frequent awakenings snoring noted  Exam: heart sounds normal rate, regular rhythm, normal S1, S2, no murmurs, rubs, clicks or gallops, chest clear, no hepatosplenomegaly        ASSESSMENT:  Diabetes Mellitus: asymptomatic,better. Results for orders placed or performed in visit on 07/11/18   AMB POC HEMOGLOBIN A1C   Result Value Ref Range    Hemoglobin A1c (POC) 7.7 %         Hypertension  Controlled  Not that well has not taken med today no home readings   edema from obesity  Weight gain from insulin  PLAN:  See orders for this visit as documented in the electronic medical record Issues reviewed with him: referral to Diabetic Education department, diabetic diet discussed in detail, written exchange diet given and low cholesterol diet, weight control and daily exercise discussed. The patient is advised to begin progressive daily aerobic exercise program, follow a low fat, low cholesterol diet and attempt to lose weight.     Results for orders placed or performed in visit on 07/11/18   AMB POC HEMOGLOBIN A1C   Result Value Ref Range    Hemoglobin A1c (POC) 7.7 %             Hypertension fair controlled for age based on guidelines  Gloria djust his dose to 100/25 losartan HCTZ  Advise home BP readingss large cuff          1. Encounter for immunization    - INFLUENZA VIRUS VAC QUAD,SPLIT,PRESV FREE SYRINGE IM    2. 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  Essential hypertension  Fair  Home BP monitoring stressed and sending me home readings critical refill losartan / HCTZ for now

## 2018-11-16 LAB
ALBUMIN SERPL-MCNC: 4.3 G/DL (ref 3.5–5.5)
ALBUMIN/CREAT UR: 7.2 MG/G CREAT (ref 0–30)
ALBUMIN/GLOB SERPL: 1.3 {RATIO} (ref 1.2–2.2)
ALP SERPL-CCNC: 46 IU/L (ref 39–117)
ALT SERPL-CCNC: 53 IU/L (ref 0–44)
AST SERPL-CCNC: 42 IU/L (ref 0–40)
BASOPHILS # BLD AUTO: 0 X10E3/UL (ref 0–0.2)
BASOPHILS NFR BLD AUTO: 0 %
BILIRUB SERPL-MCNC: 0.6 MG/DL (ref 0–1.2)
BUN SERPL-MCNC: 14 MG/DL (ref 6–24)
BUN/CREAT SERPL: 16 (ref 9–20)
CALCIUM SERPL-MCNC: 9.3 MG/DL (ref 8.7–10.2)
CHLORIDE SERPL-SCNC: 101 MMOL/L (ref 96–106)
CHOLEST SERPL-MCNC: 91 MG/DL (ref 100–199)
CO2 SERPL-SCNC: 20 MMOL/L (ref 20–29)
CREAT SERPL-MCNC: 0.85 MG/DL (ref 0.76–1.27)
CREAT UR-MCNC: 48.4 MG/DL
EOSINOPHIL # BLD AUTO: 0.2 X10E3/UL (ref 0–0.4)
EOSINOPHIL NFR BLD AUTO: 3 %
ERYTHROCYTE [DISTWIDTH] IN BLOOD BY AUTOMATED COUNT: 14.6 % (ref 12.3–15.4)
GLOBULIN SER CALC-MCNC: 3.3 G/DL (ref 1.5–4.5)
GLUCOSE SERPL-MCNC: 64 MG/DL (ref 65–99)
HBA1C MFR BLD: 9.2 % (ref 4.8–5.6)
HCT VFR BLD AUTO: 41.9 % (ref 37.5–51)
HDLC SERPL-MCNC: 31 MG/DL
HGB BLD-MCNC: 13.8 G/DL (ref 13–17.7)
IMM GRANULOCYTES # BLD: 0 X10E3/UL (ref 0–0.1)
IMM GRANULOCYTES NFR BLD: 0 %
INTERPRETATION, 910389: NORMAL
LDLC SERPL CALC-MCNC: 31 MG/DL (ref 0–99)
LYMPHOCYTES # BLD AUTO: 1.9 X10E3/UL (ref 0.7–3.1)
LYMPHOCYTES NFR BLD AUTO: 32 %
Lab: NORMAL
MCH RBC QN AUTO: 28.2 PG (ref 26.6–33)
MCHC RBC AUTO-ENTMCNC: 32.9 G/DL (ref 31.5–35.7)
MCV RBC AUTO: 86 FL (ref 79–97)
MICROALBUMIN UR-MCNC: 3.5 UG/ML
MONOCYTES # BLD AUTO: 0.6 X10E3/UL (ref 0.1–0.9)
MONOCYTES NFR BLD AUTO: 10 %
NEUTROPHILS # BLD AUTO: 3.2 X10E3/UL (ref 1.4–7)
NEUTROPHILS NFR BLD AUTO: 55 %
PLATELET # BLD AUTO: 201 X10E3/UL (ref 150–379)
POTASSIUM SERPL-SCNC: 3.9 MMOL/L (ref 3.5–5.2)
PROT SERPL-MCNC: 7.6 G/DL (ref 6–8.5)
RBC # BLD AUTO: 4.89 X10E6/UL (ref 4.14–5.8)
SODIUM SERPL-SCNC: 139 MMOL/L (ref 134–144)
TRIGL SERPL-MCNC: 147 MG/DL (ref 0–149)
VLDLC SERPL CALC-MCNC: 29 MG/DL (ref 5–40)
WBC # BLD AUTO: 5.8 X10E3/UL (ref 3.4–10.8)

## 2018-11-16 NOTE — PROGRESS NOTES
Lab Results       Component                Value               Date/Time                  Hemoglobin A1c           9.2 (H)             11/15/2018 09:27 AM        Hemoglobin A1c (POC)     7.7                 07/11/2018 02:55 PM   Labs are stable but control diabetes   Not great still  Review this with endocrinology  Fatty liver changes with mild elevated liver enzymes

## 2018-12-11 ENCOUNTER — TELEPHONE (OUTPATIENT)
Dept: INTERNAL MEDICINE CLINIC | Age: 43
End: 2018-12-11

## 2018-12-11 NOTE — TELEPHONE ENCOUNTER
Per Reyes Goody with [employer] the form received was for the prescription not the CMN  . She stated that she will fax it to us today. Form received and put on Dr. Ambrocio Gibson desk.

## 2018-12-11 NOTE — TELEPHONE ENCOUNTER
Opal Chang, is following up on Certificate of Medical Necessity faxed on 11/04/18, needs to be signed and faxed to [phone]     Best contact # [phone] option 2      envera

## 2018-12-11 NOTE — TELEPHONE ENCOUNTER
Please call patient and let him know I faxed the necessary form to [employer] to receive his pump supplies. However, he has not been seen since 2/18 and cancelled his visit in 6/18 and was a no-show in 10/18 and has yet to reschedule. I will not continue to fill out diabetes related forms unless he makes a follow up appointment to come see me for the next available in 3-4 months. Otherwise he can have Dr. Welton Leventhal fill out future forms if he desires him to manage his diabetes.

## 2018-12-11 NOTE — TELEPHONE ENCOUNTER
Please call her to find out what is needed as we did fax an order back in November. See scanned media. Thanks.

## 2018-12-26 NOTE — TELEPHONE ENCOUNTER
Pt notified of message per Dr. Simran Schuster and voiced understanding of what was read to him. Patient will call the [place] office to schedule an appointment. ambulatory

## 2019-02-19 ENCOUNTER — OFFICE VISIT (OUTPATIENT)
Dept: INTERNAL MEDICINE CLINIC | Age: 44
End: 2019-02-19

## 2019-02-19 VITALS
HEIGHT: 69 IN | TEMPERATURE: 98.1 F | HEART RATE: 106 BPM | RESPIRATION RATE: 18 BRPM | WEIGHT: 315 LBS | DIASTOLIC BLOOD PRESSURE: 75 MMHG | OXYGEN SATURATION: 98 % | BODY MASS INDEX: 46.65 KG/M2 | SYSTOLIC BLOOD PRESSURE: 142 MMHG

## 2019-02-19 DIAGNOSIS — I10 ESSENTIAL HYPERTENSION: Primary | ICD-10-CM

## 2019-02-19 DIAGNOSIS — E66.01 OBESITY, MORBID (HCC): ICD-10-CM

## 2019-02-19 DIAGNOSIS — E11.8 CONTROLLED DIABETES MELLITUS TYPE 2 WITH COMPLICATIONS, UNSPECIFIED WHETHER LONG TERM INSULIN USE (HCC): ICD-10-CM

## 2019-02-19 RX ORDER — LOSARTAN POTASSIUM 100 MG/1
100 TABLET ORAL DAILY
Qty: 90 TAB | Refills: 1 | Status: SHIPPED | OUTPATIENT
Start: 2019-02-19 | End: 2019-07-31 | Stop reason: SDUPTHER

## 2019-02-19 RX ORDER — CHLORTHALIDONE 25 MG/1
25 TABLET ORAL DAILY
Qty: 90 TAB | Refills: 1 | Status: SHIPPED | OUTPATIENT
Start: 2019-02-19 | End: 2019-07-31 | Stop reason: SDUPTHER

## 2019-02-19 RX ORDER — METFORMIN HYDROCHLORIDE 500 MG/1
TABLET, EXTENDED RELEASE ORAL
Qty: 360 TAB | Refills: 3
Start: 2019-02-19 | End: 2019-07-08 | Stop reason: SDUPTHER

## 2019-02-19 NOTE — PROGRESS NOTES
Chief Complaint   Patient presents with    Diabetes    Hypertension    Cholesterol Problem     Chief Complaint   Patient presents with    Diabetes    Hypertension    Cholesterol Problem     SUBJECTIVE:  37 y.o. male for follow up of diabetes. Diabetic Review of Systems - medication compliance: compliant all of the time, diabetic diet compliance: noncompliant some of the time, home glucose monitoring: is performed regularly, fasting values range 120 to 130, acute symptoms are none. Other symptoms and concerns:weight gain. Needing large amount insulin  up to 100 units   Basal   6 U per hour,  15 to 2 u bolus  Has seen lower readings lately  On pump > 6 years5  New pump for 7 months better readings now  On Insulin  Since 15 years ago   c peptide suggests type 2on metformn now saw endo and fels glucose better    Current Outpatient Medications   Medication Sig Dispense Refill    metFORMIN ER (GLUCOPHAGE XR) 500 mg tablet Take 2 tabs with breakfast and dinner 360 Tab 3    losartan (COZAAR) 100 mg tablet Take 1 Tab by mouth daily. 90 Tab 1    chlorthalidone (HYGROTEN) 25 mg tablet Take 1 Tab by mouth daily. 90 Tab 1    atorvastatin (LIPITOR) 10 mg tablet Take 1 Tab by mouth daily. 90 Tab 3    insulin lispro (HUMALOG) 100 unit/mL injection Use as directed for insulin pump up to 200 units per day 3 Vial 0    Blood-Glucose Meter (ONE TOUCH VERIO IQ METER) misc Use QID 1 each 0    Subcutaneous Insulin Pump Misc by Does Not Apply route.  aspirin 81 mg tablet Take  by mouth.        Vitals:    02/19/19 0903 02/19/19 0914   BP: 155/89 142/75   Pulse: (!) 106    Resp: 18    Temp: 98.1 °F (36.7 °C)    TempSrc: Oral    SpO2: 98%    Weight: (!) 379 lb (171.9 kg)    Height: 5' 9\" (1.753 m)      Vitals:    02/19/19 0903 02/19/19 0914   BP: 155/89 142/75   Pulse: (!) 106    Resp: 18    Temp: 98.1 °F (36.7 °C)    TempSrc: Oral    SpO2: 98%    Weight: (!) 379 lb (171.9 kg)    Height: 5' 9\" (1.753 m) OBJECTIVE:  Appearance: alert, well appearing, and in no distress and overweight. Visit Vitals  /75   Pulse (!) 106   Temp 98.1 °F (36.7 °C) (Oral)   Resp 18   Ht 5' 9\" (1.753 m)   Wt (!) 379 lb (171.9 kg)   SpO2 98%   BMI 55.97 kg/m²     Wt Readings from Last 3 Encounters:   02/19/19 (!) 379 lb (171.9 kg)   11/15/18 (!) 380 lb (172.4 kg)   07/11/18 (!) 364 lb (165.1 kg)       Cardiovascular ROS: no chest pain or dyspnea on exertion  Neurological ROS: no TIA or stroke symptoms  General ROS: negative for - chills, fatigue, fever, malaise, night sweats or sleep disturbance  Endocrine ROS: negative for - hair pattern changes, hot flashes, malaise/lethargy, palpitations, polydipsia/polyuria, temperature intolerance or unexpected weight changes  Has not seen endocrine  Fatigue frequent awakenings snoring noted  Exam: heart sounds normal rate, regular rhythm, normal S1, S2, no murmurs, rubs, clicks or gallops, chest clear, no hepatosplenomegaly        ASSESSMENT:  Diabetes Mellitus: asymptomatic,better. Results for orders placed or performed in visit on 11/15/18   CBC WITH AUTOMATED DIFF   Result Value Ref Range    WBC 5.8 3.4 - 10.8 x10E3/uL    RBC 4.89 4.14 - 5.80 x10E6/uL    HGB 13.8 13.0 - 17.7 g/dL    HCT 41.9 37.5 - 51.0 %    MCV 86 79 - 97 fL    MCH 28.2 26.6 - 33.0 pg    MCHC 32.9 31.5 - 35.7 g/dL    RDW 14.6 12.3 - 15.4 %    PLATELET 781 286 - 502 x10E3/uL    NEUTROPHILS 55 Not Estab. %    Lymphocytes 32 Not Estab. %    MONOCYTES 10 Not Estab. %    EOSINOPHILS 3 Not Estab. %    BASOPHILS 0 Not Estab. %    ABS. NEUTROPHILS 3.2 1.4 - 7.0 x10E3/uL    Abs Lymphocytes 1.9 0.7 - 3.1 x10E3/uL    ABS. MONOCYTES 0.6 0.1 - 0.9 x10E3/uL    ABS. EOSINOPHILS 0.2 0.0 - 0.4 x10E3/uL    ABS. BASOPHILS 0.0 0.0 - 0.2 x10E3/uL    IMMATURE GRANULOCYTES 0 Not Estab. %    ABS. IMM.  GRANS. 0.0 0.0 - 0.1 x10E3/uL   LIPID PANEL   Result Value Ref Range    Cholesterol, total 91 (L) 100 - 199 mg/dL    Triglyceride 147 0 - 149 mg/dL    HDL Cholesterol 31 (L) >39 mg/dL    VLDL, calculated 29 5 - 40 mg/dL    LDL, calculated 31 0 - 99 mg/dL   METABOLIC PANEL, COMPREHENSIVE   Result Value Ref Range    Glucose 64 (L) 65 - 99 mg/dL    BUN 14 6 - 24 mg/dL    Creatinine 0.85 0.76 - 1.27 mg/dL    GFR est non- >59 mL/min/1.73    GFR est  >59 mL/min/1.73    BUN/Creatinine ratio 16 9 - 20    Sodium 139 134 - 144 mmol/L    Potassium 3.9 3.5 - 5.2 mmol/L    Chloride 101 96 - 106 mmol/L    CO2 20 20 - 29 mmol/L    Calcium 9.3 8.7 - 10.2 mg/dL    Protein, total 7.6 6.0 - 8.5 g/dL    Albumin 4.3 3.5 - 5.5 g/dL    GLOBULIN, TOTAL 3.3 1.5 - 4.5 g/dL    A-G Ratio 1.3 1.2 - 2.2    Bilirubin, total 0.6 0.0 - 1.2 mg/dL    Alk. phosphatase 46 39 - 117 IU/L    AST (SGOT) 42 (H) 0 - 40 IU/L    ALT (SGPT) 53 (H) 0 - 44 IU/L   HEMOGLOBIN A1C W/O EAG   Result Value Ref Range    Hemoglobin A1c 9.2 (H) 4.8 - 5.6 %   MICROALBUMIN, UR, RAND W/ MICROALB/CREAT RATIO   Result Value Ref Range    Creatinine, urine 48.4 Not Estab. mg/dL    Microalbumin, urine 3.5 Not Estab. ug/mL    Microalb/Creat ratio (ug/mg creat.) 7.2 0.0 - 30.0 mg/g creat   CVD REPORT   Result Value Ref Range    INTERPRETATION Note    DIABETES PATIENT EDUCATION   Result Value Ref Range    PDF Image Not applicable          Hypertension  Controlled  Not that well has not taken med today no home readings   edema from obesity  Weight gain from insulin  PLAN:  See orders for this visit as documented in the electronic medical record Issues reviewed with him: referral to Diabetic Education department, diabetic diet discussed in detail, written exchange diet given and low cholesterol diet, weight control and daily exercise discussed. The patient is advised to begin progressive daily aerobic exercise program, follow a low fat, low cholesterol diet and attempt to lose weight.     Results for orders placed or performed in visit on 11/15/18   CBC WITH AUTOMATED DIFF   Result Value Ref Range    WBC 5.8 3.4 - 10.8 x10E3/uL    RBC 4.89 4.14 - 5.80 x10E6/uL    HGB 13.8 13.0 - 17.7 g/dL    HCT 41.9 37.5 - 51.0 %    MCV 86 79 - 97 fL    MCH 28.2 26.6 - 33.0 pg    MCHC 32.9 31.5 - 35.7 g/dL    RDW 14.6 12.3 - 15.4 %    PLATELET 583 884 - 261 x10E3/uL    NEUTROPHILS 55 Not Estab. %    Lymphocytes 32 Not Estab. %    MONOCYTES 10 Not Estab. %    EOSINOPHILS 3 Not Estab. %    BASOPHILS 0 Not Estab. %    ABS. NEUTROPHILS 3.2 1.4 - 7.0 x10E3/uL    Abs Lymphocytes 1.9 0.7 - 3.1 x10E3/uL    ABS. MONOCYTES 0.6 0.1 - 0.9 x10E3/uL    ABS. EOSINOPHILS 0.2 0.0 - 0.4 x10E3/uL    ABS. BASOPHILS 0.0 0.0 - 0.2 x10E3/uL    IMMATURE GRANULOCYTES 0 Not Estab. %    ABS. IMM. GRANS. 0.0 0.0 - 0.1 x10E3/uL   LIPID PANEL   Result Value Ref Range    Cholesterol, total 91 (L) 100 - 199 mg/dL    Triglyceride 147 0 - 149 mg/dL    HDL Cholesterol 31 (L) >39 mg/dL    VLDL, calculated 29 5 - 40 mg/dL    LDL, calculated 31 0 - 99 mg/dL   METABOLIC PANEL, COMPREHENSIVE   Result Value Ref Range    Glucose 64 (L) 65 - 99 mg/dL    BUN 14 6 - 24 mg/dL    Creatinine 0.85 0.76 - 1.27 mg/dL    GFR est non- >59 mL/min/1.73    GFR est  >59 mL/min/1.73    BUN/Creatinine ratio 16 9 - 20    Sodium 139 134 - 144 mmol/L    Potassium 3.9 3.5 - 5.2 mmol/L    Chloride 101 96 - 106 mmol/L    CO2 20 20 - 29 mmol/L    Calcium 9.3 8.7 - 10.2 mg/dL    Protein, total 7.6 6.0 - 8.5 g/dL    Albumin 4.3 3.5 - 5.5 g/dL    GLOBULIN, TOTAL 3.3 1.5 - 4.5 g/dL    A-G Ratio 1.3 1.2 - 2.2    Bilirubin, total 0.6 0.0 - 1.2 mg/dL    Alk.  phosphatase 46 39 - 117 IU/L    AST (SGOT) 42 (H) 0 - 40 IU/L    ALT (SGPT) 53 (H) 0 - 44 IU/L   HEMOGLOBIN A1C W/O EAG   Result Value Ref Range    Hemoglobin A1c 9.2 (H) 4.8 - 5.6 %   MICROALBUMIN, UR, RAND W/ MICROALB/CREAT RATIO   Result Value Ref Range    Creatinine, urine 48.4 Not Estab. mg/dL    Microalbumin, urine 3.5 Not Estab. ug/mL    Microalb/Creat ratio (ug/mg creat.) 7.2 0.0 - 30.0 mg/g creat   CVD REPORT   Result Value Ref Range    INTERPRETATION Note    DIABETES PATIENT EDUCATION   Result Value Ref Range    PDF Image Not applicable              Hypertension fair controlled for age based on guidelines  Gloria djust his dose to 100/25 losartan HCTZ  Advise home BP readingss large cuff      2. 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 Essential hypertension  Fair  Home BP monitoring stressed    Not doing yet    Plan switch meds to more effective thiazide diuretic      Diagnoses and all orders for this visit:    1. Essential hypertension    2. Obesity, morbid ([facility]    3. Controlled diabetes mellitus type 2 with complications, unspecified whether long term insulin use (HCC)    Other orders  -     metFORMIN ER (GLUCOPHAGE XR) 500 mg tablet; Take 2 tabs with breakfast and dinner  -     losartan (COZAAR) 100 mg tablet; Take 1 Tab by mouth daily. -     chlorthalidone (HYGROTEN) 25 mg tablet; Take 1 Tab by mouth daily.

## 2019-02-19 NOTE — PROGRESS NOTES
1. Have you been to the ER, urgent care clinic since your last visit? Hospitalized since your last visit? No    2. Have you seen or consulted any other health care providers outside of the [address] [place] [address] since your last visit? Include any pap smears or colon screening. Yes.   Dr Eden Comp

## 2019-04-19 ENCOUNTER — OFFICE VISIT (OUTPATIENT)
Dept: ENDOCRINOLOGY | Age: 44
End: 2019-04-19

## 2019-04-19 VITALS
HEART RATE: 114 BPM | DIASTOLIC BLOOD PRESSURE: 48 MMHG | HEIGHT: 69 IN | WEIGHT: 315 LBS | SYSTOLIC BLOOD PRESSURE: 115 MMHG | BODY MASS INDEX: 46.65 KG/M2

## 2019-04-19 DIAGNOSIS — E78.00 PURE HYPERCHOLESTEROLEMIA: ICD-10-CM

## 2019-04-19 DIAGNOSIS — Z79.4 DIABETES MELLITUS DUE TO UNDERLYING CONDITION WITH MILD NONPROLIFERATIVE RETINOPATHY WITHOUT MACULAR EDEMA, WITH LONG-TERM CURRENT USE OF INSULIN, UNSPECIFIED LATERALITY (HCC): Primary | ICD-10-CM

## 2019-04-19 DIAGNOSIS — E08.3299 DIABETES MELLITUS DUE TO UNDERLYING CONDITION WITH MILD NONPROLIFERATIVE RETINOPATHY WITHOUT MACULAR EDEMA, WITH LONG-TERM CURRENT USE OF INSULIN, UNSPECIFIED LATERALITY (HCC): Primary | ICD-10-CM

## 2019-04-19 DIAGNOSIS — I10 ESSENTIAL HYPERTENSION: ICD-10-CM

## 2019-04-19 LAB — HBA1C MFR BLD HPLC: 8.2 %

## 2019-04-19 NOTE — PROGRESS NOTES
Chief Complaint   Patient presents with    Diabetes     pcp and pharmacy confirmed    Diabetic Foot Exam     due     History of Present Illness: Erin Brown Sr. is a 37 y.o. male here for follow up of diabetes. Weight up 10 lbs since last visit in 2/18. Current pump settings are as follows:  - basal: 12a: 6.35 units/hr  - Carb ratio: 3  - sensitivity: 10  - target: 100-120  - active insulin time: 3:30 hr    Has been able to tolerate 2 tabs bid of metformin ER for the most part. Was trained on the sensor and has been able to stay in auto mode for the most part. Finds this is able to keep his sugars very well regulated but when he is out of automode does have higher sugars. Finds his sugars don't always come all the way down when they are high so we'll change the active insulin time to help with this. compliant with BP and lipid regimen. Current Outpatient Medications   Medication Sig    metFORMIN ER (GLUCOPHAGE XR) 500 mg tablet Take 2 tabs with breakfast and dinner    losartan (COZAAR) 100 mg tablet Take 1 Tab by mouth daily.  chlorthalidone (HYGROTEN) 25 mg tablet Take 1 Tab by mouth daily.  atorvastatin (LIPITOR) 10 mg tablet Take 1 Tab by mouth daily.  insulin lispro (HUMALOG) 100 unit/mL injection Use as directed for insulin pump up to 200 units per day    Subcutaneous Insulin Pump Misc by Does Not Apply route.  aspirin 81 mg tablet Take  by mouth.  Blood-Glucose Meter (ONE TOUCH VERIO IQ METER) misc Use QID     No current facility-administered medications for this visit.       Allergies   Allergen Reactions    Lisinopril Cough     Review of Systems:  - Eyes: no blurry vision or double vision  - Cardiovascular: no chest pain  - Respiratory: no shortness of breath  - Musculoskeletal: no myalgias  - Neurological: (+) numbness/tingling in extremities    Physical Examination:  Blood pressure 115/48, pulse (!) 114, height 5' 9\" (1.753 m), weight (!) 380 lb 3.2 oz (172.5 kg).  - General: pleasant, no distress, good eye contact   - Neck: no carotid bruits  - Cardiovascular: regular, normal rate, nl s1 and s2, no m/r/g,   - Respiratory: clear bilaterally  - Integumentary: no edema,   - Psychiatric: normal mood and affect    Diabetic foot exam:     Left Foot:   Visual Exam: callous - moderate   Pulse DP: 2+ (normal)   Filament test: reduced sensation    Vibratory sensation: diminished      Right Foot:   Visual Exam: callous - moderate   Pulse DP: 2+ (normal)   Filament test: reduced sensation    Vibratory sensation: diminished         Data Reviewed:   Component      Latest Ref Rng & Units 11/15/2018 11/15/2018 11/15/2018 11/15/2018           9:27 AM  9:27 AM  9:27 AM  9:27 AM   Glucose      65 - 99 mg/dL   64 (L)    BUN      6 - 24 mg/dL   14    Creatinine      0.76 - 1.27 mg/dL   0.85    GFR est non-AA      >59 mL/min/1.73   107    GFR est AA      >59 mL/min/1.73   123    BUN/Creatinine ratio      9 - 20   16    Sodium      134 - 144 mmol/L   139    Potassium      3.5 - 5.2 mmol/L   3.9    Chloride      96 - 106 mmol/L   101    CO2      20 - 29 mmol/L   20    Calcium      8.7 - 10.2 mg/dL   9.3    Protein, total      6.0 - 8.5 g/dL   7.6    Albumin      3.5 - 5.5 g/dL   4.3    GLOBULIN, TOTAL      1.5 - 4.5 g/dL   3.3    A-G Ratio      1.2 - 2.2   1.3    Bilirubin, total      0.0 - 1.2 mg/dL   0.6    Alk. phosphatase      39 - 117 IU/L   46    AST      0 - 40 IU/L   42 (H)    ALT (SGPT)      0 - 44 IU/L   53 (H)    Cholesterol, total      100 - 199 mg/dL    91 (L)   Triglyceride      0 - 149 mg/dL    147   HDL Cholesterol      >39 mg/dL    31 (L)   VLDL, calculated      5 - 40 mg/dL    29   LDL, calculated      0 - 99 mg/dL    31   Creatinine, urine      Not Estab. mg/dL 48.4      Microalbumin, urine      Not Estab. ug/mL 3.5      Microalbumin/Creat.  Ratio      0.0 - 30.0 mg/g creat 7.2      Hemoglobin A1c, (calculated)      4.8 - 5.6 %  9.2 (H)       Component      Latest Ref Rng & Units 7/11/2018           2:55 PM   Hemoglobin A1c (POC)      % 7.7     Component      Latest Ref Rng & Units 4/19/2019           2:37 PM   Hemoglobin A1c (POC)      % 8.2       Assessment/Plan:     1. 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 He appears to have Type 1 diabetes not type 2 given his c-peptide was 0.8 in 11/16. However, given his weight, I agree with metformin to help with insulin resistance. I made his active insulin time less to help allow him to correct for highs.  - cont metformin  mg 2 tabs bid  - cont current pump settings aside from change below  - check bs 3 times per day  - foot exam done 4/19  - eye exam 12/17  - microalbumin nl 11/18  - check A1c and cmp prior to next visit      2. Essential hypertension: his BP was at goal < 140/90  -  cont current regimen for now      3. Pure hypercholesterolemia: LDL 39 in 10/17 on atorvastatin 10 mg daily and 31 in 11/18  - cont lipitor 10 mg daily  - check lipids prior to next visit        Patient Instructions   1)  Current pump settings are as follows:  - basal: 12a: 6.35 units/hr  - Carb ratio: 3  - sensitivity: 10  - target: 100-120  - active insulin time: 3 hr    I hope with making the active insulin time less at 3 hours down from 3:30 hr, that this will allow the pump to give you more insulin when you are high. 2) Your A1c was 8.2% down from 9.2%. 3) Your blood pressure is at goal.    4) You can Dr. Speedy Lou at [phone] or Dr. Beto Stevens at [phone] or Dr. Bernadette White [phone] for podiatry. Follow-up and Dispositions    · Return in about 6 months (around 10/19/2019).                Copy sent to:  Randall Gonzalez MD

## 2019-04-19 NOTE — PATIENT INSTRUCTIONS
1)  Current pump settings are as follows:  - basal: 12a: 6.35 units/hr  - Carb ratio: 3  - sensitivity: 10  - target: 100-120  - active insulin time: 3 hr    I hope with making the active insulin time less at 3 hours down from 3:30 hr, that this will allow the pump to give you more insulin when you are high. 2) Your A1c was 8.2% down from 9.2%. 3) Your blood pressure is at goal.    4) You can Dr. Damion Rocha at [phone] or Dr. Phil Pablo at [phone] or Dr. Michael Miramontes [phone] for podiatry.

## 2019-07-31 RX ORDER — CHLORTHALIDONE 25 MG/1
TABLET ORAL
Qty: 90 TAB | Refills: 1 | Status: SHIPPED | OUTPATIENT
Start: 2019-07-31 | End: 2020-01-27

## 2019-07-31 RX ORDER — LOSARTAN POTASSIUM 100 MG/1
TABLET ORAL
Qty: 90 TAB | Refills: 1 | Status: SHIPPED | OUTPATIENT
Start: 2019-07-31 | End: 2020-01-27

## 2019-08-20 DIAGNOSIS — E11.8 CONTROLLED DIABETES MELLITUS TYPE 2 WITH COMPLICATIONS, UNSPECIFIED WHETHER LONG TERM INSULIN USE (HCC): ICD-10-CM

## 2019-08-20 RX ORDER — INSULIN LISPRO 100 [IU]/ML
INJECTION, SOLUTION INTRAVENOUS; SUBCUTANEOUS
Qty: 3 VIAL | Refills: 0
Start: 2019-08-20 | End: 2019-08-22 | Stop reason: SDUPTHER

## 2019-08-20 NOTE — TELEPHONE ENCOUNTER
and dosage if known: Humalog 100 mls       Is patient out of this medication (yes/no): no       Pharmacy name: [facility]   Pharmacy listed in chart? (yes/no): yes   Pharmacy phone number: [phone]       Details to clarify the request: Pt requested a Rx refill on Humalog 100mls called into [facility] @ [phone], only requested 2 or 3. Pt stated he is running low, requested a refill until 90 days supply arrives.      [facility]

## 2019-08-22 ENCOUNTER — OFFICE VISIT (OUTPATIENT)
Dept: INTERNAL MEDICINE CLINIC | Age: 44
End: 2019-08-22

## 2019-08-22 VITALS
TEMPERATURE: 98.2 F | HEART RATE: 101 BPM | WEIGHT: 315 LBS | SYSTOLIC BLOOD PRESSURE: 122 MMHG | RESPIRATION RATE: 18 BRPM | DIASTOLIC BLOOD PRESSURE: 60 MMHG | HEIGHT: 69 IN | BODY MASS INDEX: 46.65 KG/M2 | OXYGEN SATURATION: 97 %

## 2019-08-22 DIAGNOSIS — E66.01 OBESITY, MORBID (HCC): ICD-10-CM

## 2019-08-22 DIAGNOSIS — I10 ESSENTIAL HYPERTENSION: Primary | ICD-10-CM

## 2019-08-22 DIAGNOSIS — E08.3299 DIABETES MELLITUS DUE TO UNDERLYING CONDITION WITH MILD NONPROLIFERATIVE RETINOPATHY WITHOUT MACULAR EDEMA, WITH LONG-TERM CURRENT USE OF INSULIN, UNSPECIFIED LATERALITY (HCC): ICD-10-CM

## 2019-08-22 DIAGNOSIS — E11.8 CONTROLLED DIABETES MELLITUS TYPE 2 WITH COMPLICATIONS, UNSPECIFIED WHETHER LONG TERM INSULIN USE (HCC): ICD-10-CM

## 2019-08-22 DIAGNOSIS — Z79.4 DIABETES MELLITUS DUE TO UNDERLYING CONDITION WITH MILD NONPROLIFERATIVE RETINOPATHY WITHOUT MACULAR EDEMA, WITH LONG-TERM CURRENT USE OF INSULIN, UNSPECIFIED LATERALITY (HCC): ICD-10-CM

## 2019-08-22 RX ORDER — INSULIN LISPRO 100 [IU]/ML
INJECTION, SOLUTION INTRAVENOUS; SUBCUTANEOUS
Qty: 2 VIAL | Refills: 0
Start: 2019-08-22 | End: 2019-08-23 | Stop reason: SDUPTHER

## 2019-08-22 NOTE — PROGRESS NOTES
1. Have you been to the ER, urgent care clinic since your last visit? Hospitalized since your last visit? No    2. Have you seen or consulted any other health care providers outside of the [address] [place] [address] since your last visit? Include any pap smears or colon screening.  No

## 2019-08-23 DIAGNOSIS — E11.8 CONTROLLED DIABETES MELLITUS TYPE 2 WITH COMPLICATIONS, UNSPECIFIED WHETHER LONG TERM INSULIN USE (HCC): ICD-10-CM

## 2019-08-23 RX ORDER — INSULIN LISPRO 100 [IU]/ML
INJECTION, SOLUTION INTRAVENOUS; SUBCUTANEOUS
Qty: 10 ML | Refills: 0 | Status: SHIPPED | OUTPATIENT
Start: 2019-08-23 | End: 2019-10-21

## 2019-08-23 NOTE — PROGRESS NOTES
Chief Complaint   Patient presents with    Diabetes    Hypertension    Cholesterol Problem     Chief Complaint   Patient presents with    Diabetes    Hypertension    Cholesterol Problem     SUBJECTIVE:  37 y.o. male for follow up of diabetes. Diabetic Review of Systems - medication compliance: compliant all of the time, diabetic diet compliance: noncompliant some of the time, home glucose monitoring: is performed regularly, fasting values range 120 to 130, acute symptoms are none. Other symptoms and concerns:weight gain. Needing large amount insulin  up to 100 units   Basal   6 U per hour,  15 to 2 u bolus  Has seen lower readings lately  On pump > 6 years5  New pump for 7 months better readings now  On Insulin  Since 15 years ago   c peptide suggests type 2on metformn now saw endo and fels glucose better    Current Outpatient Medications   Medication Sig Dispense Refill    insulin lispro (HUMALOG) 100 unit/mL injection Use as directed for insulin pump up to 200 units per day  Indications: type 2 diabetes mellitus 2 Vial 0    chlorthalidone (HYGROTEN) 25 mg tablet TAKE 1 TABLET DAILY 90 Tab 1    metFORMIN ER (GLUCOPHAGE XR) 500 mg tablet TAKE 2 TABLETS WITH BREAKFAST AND DINNER 360 Tab 1    atorvastatin (LIPITOR) 10 mg tablet Take 1 Tab by mouth daily. 90 Tab 3    Subcutaneous Insulin Pump Misc by Does Not Apply route.  aspirin 81 mg tablet Take  by mouth.  losartan (COZAAR) 100 mg tablet TAKE 1 TABLET DAILY 90 Tab 1     Vitals:    08/22/19 0952   BP: 122/60   Pulse: (!) 101   Resp: 18   Temp: 98.2 °F (36.8 °C)   TempSrc: Oral   SpO2: 97%   Weight: (!) 384 lb (174.2 kg)   Height: 5' 9\" (1.753 m)     Vitals:    08/22/19 0952   BP: 122/60   Pulse: (!) 101   Resp: 18   Temp: 98.2 °F (36.8 °C)   TempSrc: Oral   SpO2: 97%   Weight: (!) 384 lb (174.2 kg)   Height: 5' 9\" (1.753 m)         OBJECTIVE:  Appearance: alert, well appearing, and in no distress and overweight.   Visit Vitals  /60 (BP 1 Location: Right arm, BP Patient Position: Sitting)   Pulse (!) 101   Temp 98.2 °F (36.8 °C) (Oral)   Resp 18   Ht 5' 9\" (1.753 m)   Wt (!) 384 lb (174.2 kg)   SpO2 97%   BMI 56.71 kg/m²     Wt Readings from Last 3 Encounters:   08/22/19 (!) 384 lb (174.2 kg)   04/19/19 (!) 380 lb 3.2 oz (172.5 kg)   02/19/19 (!) 379 lb (171.9 kg)       Cardiovascular ROS: no chest pain or dyspnea on exertion  Neurological ROS: no TIA or stroke symptoms  General ROS: negative for - chills, fatigue, fever, malaise, night sweats or sleep disturbance  Endocrine ROS: negative for - hair pattern changes, hot flashes, malaise/lethargy, palpitations, polydipsia/polyuria, temperature intolerance or unexpected weight changes  Has not seen endocrine  Fatigue frequent awakenings snoring noted  Exam: heart sounds normal rate, regular rhythm, normal S1, S2, no murmurs, rubs, clicks or gallops, chest clear, no hepatosplenomegaly        ASSESSMENT:  Diabetes Mellitus: asymptomatic,better. Results for orders placed or performed in visit on 04/19/19   AMB POC HEMOGLOBIN A1C   Result Value Ref Range    Hemoglobin A1c (POC) 8.2 %         Hypertension  Controlled  better edema from obesity  Weight gain from insulin  PLAN:  See orders for this visit as documented in the electronic medical record Issues reviewed with him: referral to Diabetic Education department, diabetic diet discussed in detail, written exchange diet given and low cholesterol diet, weight control and daily exercise discussed. The patient is advised to begin progressive daily aerobic exercise program, follow a low fat, low cholesterol diet and attempt to lose weight.     Results for orders placed or performed in visit on 04/19/19   AMB POC HEMOGLOBIN A1C   Result Value Ref Range    Hemoglobin A1c (POC) 8.2 %             Hypertension fair controlled for age based on guidelines  Gloria djust his dose to 100/25 losartan HCTZ  Advise home BP readingss large cuff          Diagnoses and all orders for this visit:    1. Essential hypertension    2. 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 Controlled diabetes mellitus type 2 with complications, unspecified whether long term insulin use (HCC)  -     insulin lispro (HUMALOG) 100 unit/mL injection; Use as directed for insulin pump up to 200 units per day  Indications: type 2 diabetes mellitus    4.  Obesity, morbid ([facility]

## 2019-08-29 RX ORDER — INSULIN LISPRO 100 [IU]/ML
INJECTION, SOLUTION INTRAVENOUS; SUBCUTANEOUS
Qty: 230 ML | Refills: 3 | Status: SHIPPED | OUTPATIENT
Start: 2019-08-29 | End: 2019-11-25 | Stop reason: SDUPTHER

## 2019-09-17 ENCOUNTER — OFFICE VISIT (OUTPATIENT)
Dept: INTERNAL MEDICINE CLINIC | Age: 44
End: 2019-09-17

## 2019-09-17 VITALS
BODY MASS INDEX: 46.65 KG/M2 | TEMPERATURE: 97.8 F | HEART RATE: 103 BPM | DIASTOLIC BLOOD PRESSURE: 80 MMHG | WEIGHT: 315 LBS | OXYGEN SATURATION: 97 % | HEIGHT: 69 IN | RESPIRATION RATE: 18 BRPM | SYSTOLIC BLOOD PRESSURE: 133 MMHG

## 2019-09-17 DIAGNOSIS — A08.4 GASTROENTERITIS AND COLITIS, VIRAL: Primary | ICD-10-CM

## 2019-09-17 NOTE — PROGRESS NOTES
1. Have you been to the ER, urgent care clinic since your last visit? Hospitalized since your last visit? No    2. Have you seen or consulted any other health care providers outside of the [address] [place] [address] since your last visit? Include any pap smears or colon screening.  No

## 2019-09-17 NOTE — LETTER
NOTIFICATION RETURN TO WORK / SCHOOL 
 
9/17/2019 9:26 AM 
 
Mr. Wander [address] 84 
[phone] To Whom It May Concern: Oxana Boacnegra is currently under the care of Sade  Butch.. He will return to work on Friday September 20,2019. If there are questions or concerns please have the patient contact our office. Sincerely, Chante Francisco MD

## 2019-10-21 ENCOUNTER — OFFICE VISIT (OUTPATIENT)
Dept: ENDOCRINOLOGY | Age: 44
End: 2019-10-21

## 2019-10-21 VITALS
HEART RATE: 102 BPM | DIASTOLIC BLOOD PRESSURE: 59 MMHG | WEIGHT: 315 LBS | OXYGEN SATURATION: 97 % | SYSTOLIC BLOOD PRESSURE: 123 MMHG | BODY MASS INDEX: 46.65 KG/M2 | HEIGHT: 69 IN | RESPIRATION RATE: 16 BRPM

## 2019-10-21 DIAGNOSIS — E78.00 PURE HYPERCHOLESTEROLEMIA: ICD-10-CM

## 2019-10-21 DIAGNOSIS — E08.3299 DIABETES MELLITUS DUE TO UNDERLYING CONDITION WITH MILD NONPROLIFERATIVE RETINOPATHY WITHOUT MACULAR EDEMA, WITH LONG-TERM CURRENT USE OF INSULIN, UNSPECIFIED LATERALITY (HCC): Primary | ICD-10-CM

## 2019-10-21 DIAGNOSIS — I10 ESSENTIAL HYPERTENSION: ICD-10-CM

## 2019-10-21 DIAGNOSIS — Z79.4 DIABETES MELLITUS DUE TO UNDERLYING CONDITION WITH MILD NONPROLIFERATIVE RETINOPATHY WITHOUT MACULAR EDEMA, WITH LONG-TERM CURRENT USE OF INSULIN, UNSPECIFIED LATERALITY (HCC): Primary | ICD-10-CM

## 2019-10-21 LAB — HBA1C MFR BLD HPLC: 7.8 %

## 2019-10-21 RX ORDER — BISMUTH SUBSALICYLATE 262 MG
1 TABLET,CHEWABLE ORAL DAILY
COMMUNITY

## 2019-10-21 NOTE — PROGRESS NOTES
Chief Complaint   Patient presents with    Diabetes     History of Present Illness: Abram Nova Sr. is a 40 y.o. male here for follow up of diabetes. Weight down 16 lbs since last visit in 4/19. Current pump settings are as follows:  - basal: 12a: 6.35 units/hr  - Carb ratio: 3  - sensitivity: 10  - target: 100-120  - active insulin time: 3 hr    Hasn't made any other changes to his settings. Was using automode until about a month ago and there has been an issue with his pump saying \"warming up\" with automode but then never goes into this so plans on calling [employer] about this. He feels pretty comfortable counting carbs but may be underdosing as he only bolused about 20-25 grams for a palmful of potatoes last night and likely this should have been 30-35 grams so advised downloading the [employer] fabienne to help. Review of his sensor tracing shows that he is able to keep his sugars in the 100-180 range consistently as long as he counts carbs right but if not can spike in the 200-300 range a few times a week. Compliant with BP and lipid regimen. Current Outpatient Medications   Medication Sig    multivitamin (DAILY MULTI-VITAMIN) tablet Take 1 Tab by mouth daily.  insulin lispro (HUMALOG U-100 INSULIN) 100 unit/mL injection USE AS DIRECTED FOR INSULIN PUMP UP  UNITS PER DAY    chlorthalidone (HYGROTEN) 25 mg tablet TAKE 1 TABLET DAILY    losartan (COZAAR) 100 mg tablet TAKE 1 TABLET DAILY    metFORMIN ER (GLUCOPHAGE XR) 500 mg tablet TAKE 2 TABLETS WITH BREAKFAST AND DINNER    atorvastatin (LIPITOR) 10 mg tablet Take 1 Tab by mouth daily.  Subcutaneous Insulin Pump Misc by Does Not Apply route.  aspirin 81 mg tablet Take  by mouth daily. No current facility-administered medications for this visit.       Allergies   Allergen Reactions    Lisinopril Cough     Review of Systems:  - Eyes: no blurry vision or double vision  - Cardiovascular: no chest pain  - Respiratory: no shortness of breath  - Musculoskeletal: no myalgias  - Neurological: no numbness/tingling in extremities    Physical Examination:  Blood pressure 123/59, pulse (!) 102, resp. rate 16, height 5' 9\" (1.753 m), weight (!) 364 lb 6.4 oz (165.3 kg), SpO2 97 %. - General: pleasant, no distress, good eye contact   - Neck: no carotid bruits  - Cardiovascular: regular, normal rate, nl s1 and s2, no m/r/g,   - Respiratory: clear bilaterally  - Integumentary: no edema,   - Psychiatric: normal mood and affect    Data Reviewed:   Component      Latest Ref Rng & Units 10/21/2019           8:58 AM   Hemoglobin A1c (POC)      % 7.8       Assessment/Plan:     1. 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 He appears to have Type 1 diabetes not type 2 given his c-peptide was 0.8 in 11/16. However, given his weight, I agree with metformin to help with insulin resistance. I do not see any trends that warrant changes at this time but will focus on carb counting.  - cont metformin  mg 2 tabs bid  - cont current pump settings   - check bs 3 times per day  - foot exam done 4/19  - eye exam 3/19  - microalbumin nl 11/18--repeat today  - check cbc today  - check A1c by POC at next visit      2. Essential hypertension: his BP was at goal < 140/90  -  cont current regimen for now      3.  Pure hypercholesterolemia: LDL 39 in 10/17 on atorvastatin 10 mg daily and 31 in 11/18  - cont lipitor 10 mg daily  - check lipids and cmp today        Patient Instructions   1) Sign up for [employer] using the text link I sent to you and download the [employer] fabienne from the fabienne store (I-phone) or Google play store (android) (make sure you allow locations and choose the [facility] portal and choose to receive notifications) so you can communicate with me through the patient portal.  I will send you a message with your lab results. 2) Your A1c is 7.8% down from 8.2% and I hope your next one will be even better with fine tuning your carb counting. 3) You can download the [employer] fabienne to help with carb counting. Follow-up and Dispositions    · Return in about 6 months (around 4/21/2020). Copy sent to:  Gustavo Shah MD    Lab follow up: 10/24/19    Component      Latest Ref Rng & Units 10/21/2019 10/21/2019 10/21/2019 10/21/2019          10:15 AM 10:15 AM 10:15 AM 10:15 AM   Glucose      65 - 99 mg/dL  83     BUN      6 - 24 mg/dL  13     Creatinine      0.76 - 1.27 mg/dL  1.13     GFR est non-AA      >59 mL/min/1.73  79     GFR est AA      >59 mL/min/1.73  91     BUN/Creatinine ratio      9 - 20  12     Sodium      134 - 144 mmol/L  141     Potassium      3.5 - 5.2 mmol/L  3.9     Chloride      96 - 106 mmol/L  104     CO2      20 - 29 mmol/L  23     Calcium      8.7 - 10.2 mg/dL  9.0     Protein, total      6.0 - 8.5 g/dL  7.7     Albumin      3.5 - 5.5 g/dL  4.2     GLOBULIN, TOTAL      1.5 - 4.5 g/dL  3.5     A-G Ratio      1.2 - 2.2  1.2     Bilirubin, total      0.0 - 1.2 mg/dL  0.4     Alk.  phosphatase      39 - 117 IU/L  48     AST      0 - 40 IU/L  28     ALT (SGPT)      0 - 44 IU/L  41     WBC      3.4 - 10.8 x10E3/uL 5.2      RBC      4.14 - 5.80 x10E6/uL 4.64      HGB      13.0 - 17.7 g/dL 13.1      HCT      37.5 - 51.0 % 39.6      MCV      79 - 97 fL 85      MCH      26.6 - 33.0 pg 28.2      MCHC      31.5 - 35.7 g/dL 33.1      RDW      12.3 - 15.4 % 13.7      PLATELET      113 - 051 x10E3/uL 215      Cholesterol, total      100 - 199 mg/dL   90 (L)    Triglyceride      0 - 149 mg/dL   140    HDL Cholesterol      >39 mg/dL   31 (L)    VLDL, calculated      5 - 40 mg/dL   28    LDL, calculated      0 - 99 mg/dL 31    Creatinine, urine      Not Estab. mg/dL    111.8   Microalbumin, urine      Not Estab. ug/mL    17.1   Microalbumin/Creat. Ratio      0.0 - 30.0 mg/g creat    15.3     Sent him the following message through [employer]:    Your CBC (complete blood count) was normal.  -------------------------------------------------------------------------------------------------------------------  Total Cholesterol is the total number of cholesterol particles in your blood. Goal is less than 200. Triglycerides are the short term fats in your blood. Goal is less than 150. HDL is the good cholesterol in your blood. Goal is more than 50 if you are a woman and 40 if you are a man. LDL is the bad cholesterol in your blood. Goal is less than 100 unless you have heart disease and then goal is under 70. Your cholesterol is very well controlled on the atorvastatin and I think we can decrease this to either 1/2 tab daily or 1 whole tab 4x/week, your choice. I updated your med list but did not send a new prescription to your pharmacy on file that has been filling this med. Continue to follow a low cholesterol diet. Try to limit the amount of fried foods, fatty foods, butter, gravy, red meat, ice cream, cheese, and eggs in your diet, which are all high in cholesterol.  -------------------------------------------------------------------------------------------------------------------  BUN and creatinine are markers of kidney function. Your values are normal.  -------------------------------------------------------------------------------------------------------------------  ALT and AST are markers of liver function. Your values are normal.  -------------------------------------------------------------------------------------------------------------------  Microalbumin/creatinine ratio is a marker of the amount of protein in your urine. Goal is less than 30.   Your value is normal. This indicates that your kidneys are not being affected by your uncontrolled diabetes and/or blood pressure.

## 2019-10-21 NOTE — PATIENT INSTRUCTIONS
1) Sign up for [employer] using the text link I sent to you and download the [employer] fabienne from the fabienne store (Houseriephone) or Google play store (android) (make sure you allow locations and choose the [facility] portal and choose to receive notifications) so you can communicate with me through the patient portal.  I will send you a message with your lab results. 2) Your A1c is 7.8% down from 8.2% and I hope your next one will be even better with fine tuning your carb counting. 3) You can download the [employer]king fabienne to help with carb counting.

## 2019-10-21 NOTE — PROGRESS NOTES
Room: 2    Identified pt with two pt identifiers(name and ). Reviewed record in preparation for visit and have obtained necessary documentation. All patient medications has been reviewed. Chief Complaint   Patient presents with    Diabetes     A1C 7.8       Health Maintenance Due   Topic    DTaP/Tdap/Td series (1 - Tdap)    Influenza Age 5 to Adult     HEMOGLOBIN A1C Q6M     MICROALBUMIN Q1     LIPID PANEL Q1       Eye exam: 6 mo ago Dr.Tabskin ALBARRAN ([place])    Vitals:    10/21/19 0903   Pulse: 97   Resp: 16   SpO2: 97%   Weight: (!) 364 lb 6.4 oz (165.3 kg)   Height: 5' 9\" (1.753 m)   PainSc:   0 - No pain       1. Have you been to the ER, urgent care clinic since your last visit? Hospitalized since your last visit? No    2. Have you seen or consulted any other health care providers outside of the [address] [place] [address] since your last visit? Include any pap smears or colon screening. No    3. Would you like to receive your flu shot today? NO    4. Are you fasting for blood work today? NO    3) Do you have an Advanced Directive/ Living Will in place? NO  If yes, do we have a copy on file NO  If no, would you like information NO    Patient is accompanied by self I have received verbal consent from Aleksandar Ozuna. to discuss any/all medical information while they are present in the room.

## 2019-10-22 LAB
ALBUMIN SERPL-MCNC: 4.2 G/DL (ref 3.5–5.5)
ALBUMIN/CREAT UR: 15.3 MG/G CREAT (ref 0–30)
ALBUMIN/GLOB SERPL: 1.2 {RATIO} (ref 1.2–2.2)
ALP SERPL-CCNC: 48 IU/L (ref 39–117)
ALT SERPL-CCNC: 41 IU/L (ref 0–44)
AST SERPL-CCNC: 28 IU/L (ref 0–40)
BILIRUB SERPL-MCNC: 0.4 MG/DL (ref 0–1.2)
BUN SERPL-MCNC: 13 MG/DL (ref 6–24)
BUN/CREAT SERPL: 12 (ref 9–20)
CALCIUM SERPL-MCNC: 9 MG/DL (ref 8.7–10.2)
CHLORIDE SERPL-SCNC: 104 MMOL/L (ref 96–106)
CHOLEST SERPL-MCNC: 90 MG/DL (ref 100–199)
CO2 SERPL-SCNC: 23 MMOL/L (ref 20–29)
CREAT SERPL-MCNC: 1.13 MG/DL (ref 0.76–1.27)
CREAT UR-MCNC: 111.8 MG/DL
ERYTHROCYTE [DISTWIDTH] IN BLOOD BY AUTOMATED COUNT: 13.7 % (ref 12.3–15.4)
GLOBULIN SER CALC-MCNC: 3.5 G/DL (ref 1.5–4.5)
GLUCOSE SERPL-MCNC: 83 MG/DL (ref 65–99)
HCT VFR BLD AUTO: 39.6 % (ref 37.5–51)
HDLC SERPL-MCNC: 31 MG/DL
HGB BLD-MCNC: 13.1 G/DL (ref 13–17.7)
INTERPRETATION, 910389: NORMAL
LDLC SERPL CALC-MCNC: 31 MG/DL (ref 0–99)
MCH RBC QN AUTO: 28.2 PG (ref 26.6–33)
MCHC RBC AUTO-ENTMCNC: 33.1 G/DL (ref 31.5–35.7)
MCV RBC AUTO: 85 FL (ref 79–97)
MICROALBUMIN UR-MCNC: 17.1 UG/ML
PLATELET # BLD AUTO: 215 X10E3/UL (ref 150–450)
POTASSIUM SERPL-SCNC: 3.9 MMOL/L (ref 3.5–5.2)
PROT SERPL-MCNC: 7.7 G/DL (ref 6–8.5)
RBC # BLD AUTO: 4.64 X10E6/UL (ref 4.14–5.8)
SODIUM SERPL-SCNC: 141 MMOL/L (ref 134–144)
TRIGL SERPL-MCNC: 140 MG/DL (ref 0–149)
VLDLC SERPL CALC-MCNC: 28 MG/DL (ref 5–40)
WBC # BLD AUTO: 5.2 X10E3/UL (ref 3.4–10.8)

## 2019-10-24 RX ORDER — ATORVASTATIN CALCIUM 10 MG/1
5 TABLET, FILM COATED ORAL DAILY
Qty: 90 TAB | Refills: 3
Start: 2019-10-24 | End: 2019-12-09 | Stop reason: SDUPTHER

## 2019-11-25 ENCOUNTER — OFFICE VISIT (OUTPATIENT)
Dept: INTERNAL MEDICINE CLINIC | Age: 44
End: 2019-11-25

## 2019-11-25 VITALS
RESPIRATION RATE: 20 BRPM | OXYGEN SATURATION: 98 % | DIASTOLIC BLOOD PRESSURE: 84 MMHG | HEART RATE: 96 BPM | BODY MASS INDEX: 46.65 KG/M2 | HEIGHT: 69 IN | TEMPERATURE: 99.1 F | SYSTOLIC BLOOD PRESSURE: 138 MMHG | WEIGHT: 315 LBS

## 2019-11-25 DIAGNOSIS — E78.00 PURE HYPERCHOLESTEROLEMIA: ICD-10-CM

## 2019-11-25 DIAGNOSIS — Z79.4 TYPE 2 DIABETES MELLITUS WITH MILD NONPROLIFERATIVE RETINOPATHY WITHOUT MACULAR EDEMA, WITH LONG-TERM CURRENT USE OF INSULIN, UNSPECIFIED LATERALITY (HCC): Primary | ICD-10-CM

## 2019-11-25 DIAGNOSIS — I10 ESSENTIAL HYPERTENSION: ICD-10-CM

## 2019-11-25 DIAGNOSIS — Z23 ENCOUNTER FOR IMMUNIZATION: ICD-10-CM

## 2019-11-25 DIAGNOSIS — E66.01 OBESITY, MORBID (HCC): ICD-10-CM

## 2019-11-25 DIAGNOSIS — E11.3299 TYPE 2 DIABETES MELLITUS WITH MILD NONPROLIFERATIVE RETINOPATHY WITHOUT MACULAR EDEMA, WITH LONG-TERM CURRENT USE OF INSULIN, UNSPECIFIED LATERALITY (HCC): Primary | ICD-10-CM

## 2019-11-25 PROBLEM — G47.33 OSA ON CPAP: Status: ACTIVE | Noted: 2019-11-25

## 2019-11-25 PROBLEM — Z99.89 OSA ON CPAP: Status: ACTIVE | Noted: 2019-11-25

## 2019-11-25 RX ORDER — INSULIN LISPRO 100 [IU]/ML
INJECTION, SOLUTION INTRAVENOUS; SUBCUTANEOUS
Qty: 3 VIAL | Refills: 0
Start: 2019-11-25 | End: 2019-11-26 | Stop reason: SDUPTHER

## 2019-11-25 NOTE — PROGRESS NOTES
1. Have you been to the ER, urgent care clinic since your last visit? Hospitalized since your last visit? No    2. Have you seen or consulted any other health care providers outside of the [address] [place] [address] since your last visit? Include any pap smears or colon screening.  Lakhwinder Brandon

## 2019-11-25 NOTE — PROGRESS NOTES
Ariadna Nix Sr. is a 40 y.o. male with the following Problems and Medications. Patient Active Problem List   Diagnosis Code    DM (diabetes mellitus) ([facility] E11.9    HLD (hyperlipidemia) E78.5    HTN (hypertension) I10    Wound of right leg S81.801A    Cellulitis and abscess of leg L03.119, L02.419    Diabetes mellitus due to underlying condition with mild nonproliferative diabetic retinopathy without macular edema ([facility] C76.9247    Retin[facility] to secondary diabetes ([facility] E13.319    Obesity, morbid (HCC) E66.01    SHALOM on CPAP G47.33, Z99.89     Current Outpatient Medications   Medication Sig Dispense Refill    insulin lispro (HUMALOG U-100 INSULIN) 100 unit/mL injection USE AS DIRECTED FOR INSULIN PUMP UP  UNITS PER DAY 3 Vial 0    atorvastatin (LIPITOR) 10 mg tablet Take 0.5 Tabs by mouth daily. Dose change 10/24/19--updated med list--did not send prescription to the pharmacy 90 Tab 3    multivitamin (DAILY MULTI-VITAMIN) tablet Take 1 Tab by mouth daily.  chlorthalidone (HYGROTEN) 25 mg tablet TAKE 1 TABLET DAILY 90 Tab 1    losartan (COZAAR) 100 mg tablet TAKE 1 TABLET DAILY 90 Tab 1    metFORMIN ER (GLUCOPHAGE XR) 500 mg tablet TAKE 2 TABLETS WITH BREAKFAST AND DINNER 360 Tab 1    Subcutaneous Insulin Pump Misc by Does Not Apply route.  aspirin 81 mg tablet Take  by mouth daily. Saw endocrine doing ok  No major changes in meds  ( maybe lower dose lipitor) he stayed same dose and I agree      Vitals:    11/25/19 0855   BP: 138/84   Pulse: 96   Resp: 20   Temp: 99.1 °F (37.3 °C)   TempSrc: Oral   SpO2: 98%   Weight: (!) 383 lb (173.7 kg)   Height: 5' 9\" (1.753 m)   'Body mass index is 56.56 kg/m². S1 and S2 normal, no murmurs, clicks, gallops or rubs. Regular rate and rhythm. Chest is clear; no wheezes or rales. No edema or JVD. 1. Encounter for immunization    - INFLUENZA VIRUS VAC QUAD,SPLIT,PRESV FREE SYRINGE IM    2.  Type 2 diabetes mellitus with mild nonproliferative retinopathy without macular edema, with long-term current use of insulin, unspecified laterality ([address]  stable    3. Obesity, morbid ([address]  Massive issue he has not done anything to change    4. Essential hypertension  controlled    5.  Pure hypercholesterolemia  Stay 10 mg atorvastatin

## 2019-12-09 RX ORDER — ATORVASTATIN CALCIUM 10 MG/1
TABLET, FILM COATED ORAL
Qty: 90 TAB | Refills: 4 | Status: SHIPPED | OUTPATIENT
Start: 2019-12-09 | End: 2020-11-19

## 2020-01-06 RX ORDER — METFORMIN HYDROCHLORIDE 500 MG/1
TABLET, EXTENDED RELEASE ORAL
Qty: 360 TAB | Refills: 4 | Status: SHIPPED | OUTPATIENT
Start: 2020-01-06 | End: 2021-03-31

## 2020-01-27 RX ORDER — CHLORTHALIDONE 25 MG/1
TABLET ORAL
Qty: 90 TAB | Refills: 4 | Status: ON HOLD | OUTPATIENT
Start: 2020-01-27 | End: 2021-04-22

## 2020-01-27 RX ORDER — LOSARTAN POTASSIUM 100 MG/1
TABLET ORAL
Qty: 90 TAB | Refills: 4 | Status: ON HOLD | OUTPATIENT
Start: 2020-01-27 | End: 2021-04-22

## 2020-01-29 ENCOUNTER — TELEPHONE (OUTPATIENT)
Dept: ENDOCRINOLOGY | Age: 45
End: 2020-01-29

## 2020-01-29 NOTE — TELEPHONE ENCOUNTER
I have not seen this form in the past 24 hours unless it was faxed to [place] today so please check on this and if we don't have it, then have them refax it to us. Thanks.

## 2020-01-29 NOTE — TELEPHONE ENCOUNTER
----- Message from Sarah Xiong sent at 1/29/2020 12:26 PM EST -----  Regarding: Dr. Lorri Rankin Message/Vendor Calls    Caller's first and last name: [employer]      Reason for call: Follow up on request for certificate for medical necessity for insulin pump supplies.        Callback required yes/no and why: yes      Best contact number(s): [phone], option [phone]       Details to clarify the request:      Sarah Xiong

## 2020-03-02 ENCOUNTER — TELEPHONE (OUTPATIENT)
Dept: ENDOCRINOLOGY | Age: 45
End: 2020-03-02

## 2020-03-02 NOTE — TELEPHONE ENCOUNTER
Forms for anthem insulin  and infusion blood glucose monitoring device was faxed on 2/28/2020, confirmation received and forms put in urgent tray for scanning.

## 2020-08-22 RX ORDER — INSULIN LISPRO 100 [IU]/ML
INJECTION, SOLUTION INTRAVENOUS; SUBCUTANEOUS
Qty: 230 ML | Refills: 0 | Status: SHIPPED | OUTPATIENT
Start: 2020-08-22 | End: 2020-11-20

## 2020-10-15 ENCOUNTER — OFFICE VISIT (OUTPATIENT)
Dept: INTERNAL MEDICINE CLINIC | Age: 45
End: 2020-10-15
Payer: COMMERCIAL

## 2020-10-15 VITALS
SYSTOLIC BLOOD PRESSURE: 144 MMHG | WEIGHT: 315 LBS | RESPIRATION RATE: 20 BRPM | BODY MASS INDEX: 46.65 KG/M2 | TEMPERATURE: 98 F | HEART RATE: 57 BPM | OXYGEN SATURATION: 100 % | DIASTOLIC BLOOD PRESSURE: 87 MMHG | HEIGHT: 69 IN

## 2020-10-15 DIAGNOSIS — Z79.4 DIABETES MELLITUS DUE TO UNDERLYING CONDITION WITH MILD NONPROLIFERATIVE RETINOPATHY WITHOUT MACULAR EDEMA, WITH LONG-TERM CURRENT USE OF INSULIN, UNSPECIFIED LATERALITY (HCC): ICD-10-CM

## 2020-10-15 DIAGNOSIS — E08.3299 DIABETES MELLITUS DUE TO UNDERLYING CONDITION WITH MILD NONPROLIFERATIVE RETINOPATHY WITHOUT MACULAR EDEMA, WITH LONG-TERM CURRENT USE OF INSULIN, UNSPECIFIED LATERALITY (HCC): ICD-10-CM

## 2020-10-15 DIAGNOSIS — Z23 NEEDS FLU SHOT: ICD-10-CM

## 2020-10-15 DIAGNOSIS — I10 ESSENTIAL HYPERTENSION: ICD-10-CM

## 2020-10-15 LAB
ALBUMIN SERPL-MCNC: 4 G/DL (ref 3.5–5)
ALBUMIN/GLOB SERPL: 1.1 {RATIO} (ref 1.1–2.2)
ALP SERPL-CCNC: 55 U/L (ref 45–117)
ALT SERPL-CCNC: 60 U/L (ref 12–78)
ANION GAP SERPL CALC-SCNC: 8 MMOL/L (ref 5–15)
AST SERPL-CCNC: 33 U/L (ref 15–37)
BASOPHILS # BLD: 0 K/UL (ref 0–0.1)
BASOPHILS NFR BLD: 1 % (ref 0–1)
BILIRUB SERPL-MCNC: 0.6 MG/DL (ref 0.2–1)
BUN SERPL-MCNC: 16 MG/DL (ref 6–20)
BUN/CREAT SERPL: 14 (ref 12–20)
CALCIUM SERPL-MCNC: 8.7 MG/DL (ref 8.5–10.1)
CHLORIDE SERPL-SCNC: 103 MMOL/L (ref 97–108)
CHOLEST SERPL-MCNC: 76 MG/DL
CO2 SERPL-SCNC: 27 MMOL/L (ref 21–32)
CREAT SERPL-MCNC: 1.11 MG/DL (ref 0.7–1.3)
CREAT UR-MCNC: 134 MG/DL
DIFFERENTIAL METHOD BLD: NORMAL
EOSINOPHIL # BLD: 0.2 K/UL (ref 0–0.4)
EOSINOPHIL NFR BLD: 4 % (ref 0–7)
ERYTHROCYTE [DISTWIDTH] IN BLOOD BY AUTOMATED COUNT: 12.2 % (ref 11.5–14.5)
EST. AVERAGE GLUCOSE BLD GHB EST-MCNC: 189 MG/DL
GLOBULIN SER CALC-MCNC: 3.5 G/DL (ref 2–4)
GLUCOSE SERPL-MCNC: 116 MG/DL (ref 65–100)
HBA1C MFR BLD: 8.2 % (ref 4–5.6)
HCT VFR BLD AUTO: 39.9 % (ref 36.6–50.3)
HDLC SERPL-MCNC: 30 MG/DL
HDLC SERPL: 2.5 {RATIO} (ref 0–5)
HGB BLD-MCNC: 13.3 G/DL (ref 12.1–17)
IMM GRANULOCYTES # BLD AUTO: 0 K/UL (ref 0–0.04)
IMM GRANULOCYTES NFR BLD AUTO: 0 % (ref 0–0.5)
LDLC SERPL CALC-MCNC: 23 MG/DL (ref 0–100)
LIPID PROFILE,FLP: NORMAL
LYMPHOCYTES # BLD: 1.7 K/UL (ref 0.8–3.5)
LYMPHOCYTES NFR BLD: 32 % (ref 12–49)
MCH RBC QN AUTO: 28.4 PG (ref 26–34)
MCHC RBC AUTO-ENTMCNC: 33.3 G/DL (ref 30–36.5)
MCV RBC AUTO: 85.3 FL (ref 80–99)
MICROALBUMIN UR-MCNC: 1.45 MG/DL
MICROALBUMIN/CREAT UR-RTO: 11 MG/G (ref 0–30)
MONOCYTES # BLD: 0.4 K/UL (ref 0–1)
MONOCYTES NFR BLD: 7 % (ref 5–13)
NEUTS SEG # BLD: 2.9 K/UL (ref 1.8–8)
NEUTS SEG NFR BLD: 56 % (ref 32–75)
NRBC # BLD: 0 K/UL (ref 0–0.01)
NRBC BLD-RTO: 0 PER 100 WBC
PLATELET # BLD AUTO: 202 K/UL (ref 150–400)
PMV BLD AUTO: 10.3 FL (ref 8.9–12.9)
POTASSIUM SERPL-SCNC: 3.7 MMOL/L (ref 3.5–5.1)
PROT SERPL-MCNC: 7.5 G/DL (ref 6.4–8.2)
RBC # BLD AUTO: 4.68 M/UL (ref 4.1–5.7)
SODIUM SERPL-SCNC: 138 MMOL/L (ref 136–145)
TRIGL SERPL-MCNC: 115 MG/DL (ref ?–150)
VLDLC SERPL CALC-MCNC: 23 MG/DL
WBC # BLD AUTO: 5.2 K/UL (ref 4.1–11.1)

## 2020-10-15 PROCEDURE — 90686 IIV4 VACC NO PRSV 0.5 ML IM: CPT

## 2020-10-15 PROCEDURE — 90471 IMMUNIZATION ADMIN: CPT

## 2020-10-15 PROCEDURE — 99213 OFFICE O/P EST LOW 20 MIN: CPT | Performed by: INTERNAL MEDICINE

## 2020-10-15 NOTE — PROGRESS NOTES
Chief Complaint   Patient presents with    Blood Pressure Check     follow up     Diabetes    Cholesterol Problem    Medication Refill     90 day refills    Immunization/Injection     flu shot            1. Have you been to the ER, urgent care clinic since your last visit? Hospitalized since your last visit? No    2. Have you seen or consulted any other health care providers outside of the [address] [place] [address] since your last visit? Include any pap smears or colon screening.  No

## 2020-10-16 NOTE — PROGRESS NOTES
Jarek Vincent . is a 40 y.o. male with the following Problems and Medications. Patient Active Problem List   Diagnosis Code    DM (diabetes mellitus) ([facility] E11.9    HLD (hyperlipidemia) E78.5    HTN (hypertension) I10    Wound of right leg S81.801A    Cellulitis and abscess of leg L03.119, L02.419    Diabetes mellitus due to underlying condition with mild nonproliferative diabetic retinopathy without macular edema ([facility]) V26.9656    Retinopathy due to secondary diabetes ([facility] E13.319    Obesity, morbid ([facility]) E66.01    SHALOM on CPAP G47.33, Z99.89     Current Outpatient Medications   Medication Sig Dispense Refill    insulin lispro (HumaLOG U-100 Insulin) 100 unit/mL injection USE UP  UNITS DAILY AS DIRECTED VIA INSULIN PUMP--CALL [phone] FOR APPOINTMENT FOR MORE REFILLS 230 mL 0    losartan (COZAAR) 100 mg tablet TAKE 1 TABLET DAILY 90 Tab 4    chlorthalidone (HYGROTEN) 25 mg tablet TAKE 1 TABLET DAILY 90 Tab 4    metFORMIN ER (GLUCOPHAGE XR) 500 mg tablet TAKE 2 TABLETS WITH BREAKFAST AND DINNER 360 Tab 4    atorvastatin (LIPITOR) 10 mg tablet TAKE 1 TABLET DAILY 90 Tab 4    multivitamin (DAILY MULTI-VITAMIN) tablet Take 1 Tab by mouth daily.  Subcutaneous Insulin Pump Misc by Does Not Apply route.  aspirin 81 mg tablet Take  by mouth daily. Saw endocrine doing ok  No major changes in meds  ( maybe lower dose lipitor) he stayed same dose and I agree      Vitals:    10/15/20 0823 10/15/20 0904   BP: (!) 151/96 (!) 144/87   Pulse: (!) 57    Resp: 20    Temp: 98 °F (36.7 °C)    TempSrc: Oral    SpO2: 100%    Weight: (!) 374 lb (169.6 kg)    Height: 5' 9\" (1.753 m)    'Body mass index is 55.23 kg/m². S1 and S2 normal, no murmurs, clicks, gallops or rubs. Regular rate and rhythm. Chest is clear; no wheezes or rales. No edema or JVD. 1. Encounter for immunization    - INFLUENZA VIRUS VAC QUAD,SPLIT,PRESV FREE SYRINGE IM    2.  Type 2 diabetes mellitus with mild nonproliferative retinopathy without macular edema, with long-term current use of insulin, unspecified laterality ([address]  stable    3. Obesity, morbid ([address]  Massive issue he has not done anything to change    4. Essential hypertension  controlled    5. Pure hypercholesterolemia  Stay 10 mg atorvastatin  Diagnoses and all orders for this visit:    1. Needs flu shot  -     INFLUENZA VIRUS VACCINE, QUADRIVALENT (RIV4), DERIVED FROM RECOMBINANT DNA,HEMAGGLUTININ(HA) PROTEIN ONLY, PF ABX FREE    2. Essential hypertension    3. 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

## 2020-11-19 ENCOUNTER — VIRTUAL VISIT (OUTPATIENT)
Dept: ENDOCRINOLOGY | Age: 45
End: 2020-11-19
Payer: COMMERCIAL

## 2020-11-19 DIAGNOSIS — Z79.4 DIABETES MELLITUS DUE TO UNDERLYING CONDITION WITH MILD NONPROLIFERATIVE RETINOPATHY WITHOUT MACULAR EDEMA, WITH LONG-TERM CURRENT USE OF INSULIN, UNSPECIFIED LATERALITY (HCC): Primary | ICD-10-CM

## 2020-11-19 DIAGNOSIS — I10 ESSENTIAL HYPERTENSION: ICD-10-CM

## 2020-11-19 DIAGNOSIS — E08.3299 DIABETES MELLITUS DUE TO UNDERLYING CONDITION WITH MILD NONPROLIFERATIVE RETINOPATHY WITHOUT MACULAR EDEMA, WITH LONG-TERM CURRENT USE OF INSULIN, UNSPECIFIED LATERALITY (HCC): Primary | ICD-10-CM

## 2020-11-19 DIAGNOSIS — E78.00 PURE HYPERCHOLESTEROLEMIA: ICD-10-CM

## 2020-11-19 PROCEDURE — 99214 OFFICE O/P EST MOD 30 MIN: CPT | Performed by: INTERNAL MEDICINE

## 2020-11-19 RX ORDER — ATORVASTATIN CALCIUM 10 MG/1
TABLET, FILM COATED ORAL
Qty: 90 TAB | Refills: 4
Start: 2020-11-19 | End: 2021-03-03

## 2020-11-19 NOTE — PATIENT INSTRUCTIONS
1) Decrease your lipitor to 1/2 tab 3x/week. 2) Your liver, kidney, urine and CBC (complete blood count) were all normal in 10/20. 3) Your A1c claire from 7.8% to 8.2%. Do your best to focus on the meals that are causing you to spike over 180 when checked 2 hours after a meal and limit your portions of these foods. 4) Please come for a follow up visit on 5/21/21 at 8:30am in our [place] office and we'll check your A1c at your next visit. 5) Watch your salt and caffeine intake and shoot for 10 lb wt loss over the next year to get your blood pressure down.

## 2020-11-19 NOTE — PROGRESS NOTES
Chief Complaint   Patient presents with    Diabetes     pcp and pharmacy confirmed    Other     [phone] doxy-Iphone       **THIS IS A VIRTUAL VISIT VIA A VIDEO SYNCHRONOUS DISCUSSION. PATIENT AGREED TO HAVE THEIR CARE DELIVERED OVER A [employer] ME VIDEO VISIT IN PLACE OF THEIR REGULARLY SCHEDULED OFFICE VISIT**    History of Present Illness: Nick Ibanez Sr. is a 39 y.o. male here for follow up of diabetes. Current pump settings are as follows:  - basal: 12a: 6.35 units/hr  - Carb ratio: 3  - sensitivity: 10  - target: 100-120  - active insulin time: 3 hr    Has been taking 1/2 tab of lipitor everyday since last visit in 10/19. Has not made any changes to his pump settings. Did try doing carb counting with the [employer] fabienne after last visit but got out of the habit and still may not be counting carbs accurate to lead to higher post-prandial sugars that are keeping his A1c up. Compliant with BP regimen and BP was over 140 at visit with PCP last month and no changes were made to his regimen. Will work on salt and caffeine intake as he does have some soda and energy drinks. Weight up 10 lbs from 10/19 to 10/20. Current Outpatient Medications   Medication Sig    insulin lispro (HumaLOG U-100 Insulin) 100 unit/mL injection USE UP  UNITS DAILY AS DIRECTED VIA INSULIN PUMP--CALL [phone] FOR APPOINTMENT FOR MORE REFILLS    losartan (COZAAR) 100 mg tablet TAKE 1 TABLET DAILY    chlorthalidone (HYGROTEN) 25 mg tablet TAKE 1 TABLET DAILY    metFORMIN ER (GLUCOPHAGE XR) 500 mg tablet TAKE 2 TABLETS WITH BREAKFAST AND DINNER    atorvastatin (LIPITOR) 10 mg tablet TAKE 1 TABLET DAILY    multivitamin (DAILY MULTI-VITAMIN) tablet Take 1 Tab by mouth daily.  aspirin 81 mg tablet Take  by mouth daily.  Subcutaneous Insulin Pump Misc by Does Not Apply route. No current facility-administered medications for this visit.       Allergies   Allergen Reactions    Lisinopril Cough     Review of Systems: PER HPI    Physical Examination:  - GENERAL: NCAT, Appears well nourished   - EYES: EOMI, non-icteric, no proptosis   - Ear/Nose/Throat: NCAT, no visible inflammation or masses   - CARDIOVASCULAR: no cyanosis, no visible JVD   - RESPIRATORY: respiratory effort normal without any distress or labored breathing   - MUSCULOSKELETAL: Normal ROM of neck and upper extremities observed   - SKIN: No rash on face  - NEUROLOGIC:  No facial asymmetry (Cranial nerve 7 motor function), No gaze palsy   - PSYCHIATRIC: Normal affect, Normal insight and judgement       Data Reviewed:   Component      Latest Ref Rng & Units 10/15/2020   WBC      4.1 - 11.1 K/uL 5.2   RBC      4.10 - 5.70 M/uL 4.68   HGB      12.1 - 17.0 g/dL 13.3   HCT      36.6 - 50.3 % 39.9   MCV      80.0 - 99.0 FL 85.3   MCH      26.0 - 34.0 PG 28.4   MCHC      30.0 - 36.5 g/dL 33.3   RDW      11.5 - 14.5 % 12.2   PLATELET      217 - 265 K/uL 202   MPV      8.9 - 12.9 FL 10.3   NRBC      0  WBC 0.0   ABSOLUTE NRBC      0.00 - 0.01 K/uL 0.00   NEUTROPHILS      32 - 75 % 56   LYMPHOCYTES      12 - 49 % 32   MONOCYTES      5 - 13 % 7   EOSINOPHILS      0 - 7 % 4   BASOPHILS      0 - 1 % 1   IMMATURE GRANULOCYTES      0.0 - 0.5 % 0   ABS. NEUTROPHILS      1.8 - 8.0 K/UL 2.9   ABS. LYMPHOCYTES      0.8 - 3.5 K/UL 1.7   ABS. MONOCYTES      0.0 - 1.0 K/UL 0.4   ABS. EOSINOPHILS      0.0 - 0.4 K/UL 0.2   ABS. BASOPHILS      0.0 - 0.1 K/UL 0.0   ABS. IMM.  GRANS.      0.00 - 0.04 K/UL 0.0   DF       AUTOMATED   Sodium      136 - 145 mmol/L 138   Potassium      3.5 - 5.1 mmol/L 3.7   Chloride      97 - 108 mmol/L 103   CO2      21 - 32 mmol/L 27   Anion gap      5 - 15 mmol/L 8   Glucose      65 - 100 mg/dL 116 (H)   BUN      6 - 20 MG/DL 16   Creatinine      0.70 - 1.30 MG/DL 1.11   BUN/Creatinine ratio      12 - 20   14   GFR est AA      >60 ml/min/1.73m2 >60   GFR est non-AA      >60 ml/min/1.73m2 >60   Calcium      8.5 - 10.1 MG/DL 8.7   Bilirubin, total      0.2 - 1.0 MG/DL 0.6   ALT      12 - 78 U/L 60   AST      15 - 37 U/L 33   Alk. phosphatase      45 - 117 U/L 55   Protein, total      6.4 - 8.2 g/dL 7.5   Albumin      3.5 - 5.0 g/dL 4.0   Globulin      2.0 - 4.0 g/dL 3.5   A-G Ratio      1.1 - 2.2   1.1   Cholesterol, total      <200 MG/DL 76   Triglyceride      <150 MG/   HDL Cholesterol      MG/DL 30   LDL, calculated      0 - 100 MG/DL 23   VLDL, calculated      MG/DL 23   CHOL/HDL Ratio      0.0 - 5.0   2.5   Microalbumin,urine random      MG/DL 1.45   Creatinine, urine      mg/dL 134.00   Microalbumin/Creat. Ratio      0 - 30 mg/g 11   Hemoglobin A1c, (calculated)      4.0 - 5.6 % 8.2 (H)   Est. average glucose      mg/dL 189       Assessment/Plan:     1. 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 He appears to have Type 1 diabetes not type 2 given his c-peptide was 0.8 in 11/16. However, given his weight, I agree with metformin to help with insulin resistance. I do not see any trends that warrant changes at this time but will still focus on carb counting.  - cont metformin  mg 2 tabs bid  - cont current pump settings   - check bs 3 times per day  - foot exam done 4/19  - eye exam 3/19  - microalbumin nl 10/20  - check A1c by POC at next visit      2. Essential hypertension: his BP was above goal < 140/90 at PCP's office in 10/20  - cut back on salt and caffeine  -  cont current regimen for now      3.  Pure hypercholesterolemia: LDL 39 in 10/17 on atorvastatin 10 mg daily and 31 in 11/18 and in 10/19 so decreased to 1/2 tab daily and still 23 in 10/20 so decreased to 1/2 tab 3x/week  - decrease lipitor 10 mg to 1/2 tab 3x/week  - check lipids in 10/21        Patient Instructions   1) Decrease your lipitor to 1/2 tab 3x/week. 2) Your liver, kidney, urine and CBC (complete blood count) were all normal in 10/20. 3) Your A1c claire from 7.8% to 8.2%. Do your best to focus on the meals that are causing you to spike over 180 when checked 2 hours after a meal and limit your portions of these foods. 4) Please come for a follow up visit on 5/21/21 at 8:30am in our [place] office and we'll check your A1c at your next visit. 5) Watch your salt and caffeine intake and shoot for 10 lb wt loss over the next year to get your blood pressure down.         Follow-up and Dispositions    · Return 5/21/21 at 8:30am.               Copy sent to:  Heriberto Hernandez MD

## 2020-11-20 RX ORDER — INSULIN LISPRO 100 [IU]/ML
INJECTION, SOLUTION INTRAVENOUS; SUBCUTANEOUS
Qty: 230 ML | Refills: 3 | Status: SHIPPED | OUTPATIENT
Start: 2020-11-20 | End: 2020-11-23 | Stop reason: SDUPTHER

## 2020-11-23 RX ORDER — INSULIN LISPRO 100 [IU]/ML
INJECTION, SOLUTION INTRAVENOUS; SUBCUTANEOUS
Qty: 230 ML | Refills: 3 | Status: ON HOLD
Start: 2020-11-23 | End: 2021-04-13

## 2020-12-01 ENCOUNTER — VIRTUAL VISIT (OUTPATIENT)
Dept: INTERNAL MEDICINE CLINIC | Age: 45
End: 2020-12-01
Payer: COMMERCIAL

## 2020-12-01 DIAGNOSIS — B36.9 OTOMYCOSIS OF RIGHT EAR: Primary | ICD-10-CM

## 2020-12-01 DIAGNOSIS — H62.41 OTOMYCOSIS OF RIGHT EAR: Primary | ICD-10-CM

## 2020-12-01 PROCEDURE — 99213 OFFICE O/P EST LOW 20 MIN: CPT | Performed by: INTERNAL MEDICINE

## 2020-12-01 RX ORDER — CLOTRIMAZOLE 1 G/ML
SOLUTION TOPICAL
Qty: 15 ML | Refills: 0 | Status: ON HOLD | OUTPATIENT
Start: 2020-12-01 | End: 2021-04-13

## 2020-12-01 NOTE — PROGRESS NOTES
Chief Complaint   Patient presents with    Ear Drainage     Had ear ache in march seen patient first  Given drops    Now has chronic slight discharge from ear slight itch no pain or loss of hearing  Sees creamy discharge at times  Not a virtual visit today    Patient Active Problem List    Diagnosis    SHALOM on CPAP    Obesity, morbid ([address]    Retinopathy due to secondary diabetes ([address]    Diabetes mellitus due to underlying condition with mild nonproliferative diabetic retinopathy without macular edema (HCC)    Wound of right leg    Cellulitis and abscess of leg    DM (diabetes mellitus) ([address]     Pump basal  6.2  Up basal to 4 units at  Night, up to 5.2 (1/2015)    Bolus  20 to 25   Effective 5/15   15 to 20      HLD (hyperlipidemia)    HTN (hypertension)     There were no vitals filed for this visit. Ears - left ear normal, hearing grossly normal bilaterally, right canal with thick white mucous in canal  Drum seems normal  No  1. Otomycosis of right ear  Meticulous cleaning advised  - clotrimazole (LOTRIMIN) 1 % external solution; 1 drop BID to right ear for 14 days  Dispense: 15 mL; Refill: 0  adenopathy      .

## 2021-03-03 RX ORDER — ATORVASTATIN CALCIUM 10 MG/1
TABLET, FILM COATED ORAL
Qty: 90 TAB | Refills: 3 | Status: SHIPPED | OUTPATIENT
Start: 2021-03-03 | End: 2021-05-21

## 2021-03-31 RX ORDER — METFORMIN HYDROCHLORIDE 500 MG/1
TABLET, EXTENDED RELEASE ORAL
Qty: 360 TAB | Refills: 3 | Status: SHIPPED | OUTPATIENT
Start: 2021-03-31 | End: 2022-05-16 | Stop reason: SDUPTHER

## 2021-04-12 ENCOUNTER — APPOINTMENT (OUTPATIENT)
Dept: GENERAL RADIOLOGY | Age: 46
DRG: 871 | End: 2021-04-12
Attending: EMERGENCY MEDICINE
Payer: COMMERCIAL

## 2021-04-12 ENCOUNTER — APPOINTMENT (OUTPATIENT)
Dept: CT IMAGING | Age: 46
DRG: 871 | End: 2021-04-12
Attending: EMERGENCY MEDICINE
Payer: COMMERCIAL

## 2021-04-12 ENCOUNTER — HOSPITAL ENCOUNTER (INPATIENT)
Age: 46
LOS: 11 days | Discharge: HOME | DRG: 871 | End: 2021-04-23
Attending: EMERGENCY MEDICINE | Admitting: INTERNAL MEDICINE
Payer: COMMERCIAL

## 2021-04-12 DIAGNOSIS — E86.0 DEHYDRATION: ICD-10-CM

## 2021-04-12 DIAGNOSIS — Z20.822 SUSPECTED COVID-19 VIRUS INFECTION: ICD-10-CM

## 2021-04-12 DIAGNOSIS — R77.8 ELEVATED TROPONIN: Primary | ICD-10-CM

## 2021-04-12 DIAGNOSIS — E11.65 TYPE 2 DIABETES MELLITUS WITH HYPERGLYCEMIA, WITH LONG-TERM CURRENT USE OF INSULIN (HCC): ICD-10-CM

## 2021-04-12 DIAGNOSIS — Z79.4 TYPE 2 DIABETES MELLITUS WITH HYPERGLYCEMIA, WITH LONG-TERM CURRENT USE OF INSULIN (HCC): ICD-10-CM

## 2021-04-12 PROBLEM — J96.01 ACUTE RESPIRATORY FAILURE WITH HYPOXEMIA (HCC): Status: ACTIVE | Noted: 2021-04-12

## 2021-04-12 PROBLEM — J12.82 PNEUMONIA DUE TO COVID-19 VIRUS: Status: ACTIVE | Noted: 2021-04-12

## 2021-04-12 PROBLEM — U07.1 PNEUMONIA DUE TO COVID-19 VIRUS: Status: ACTIVE | Noted: 2021-04-12

## 2021-04-12 PROBLEM — E87.1 HYPONATREMIA: Status: ACTIVE | Noted: 2021-04-12

## 2021-04-12 LAB
25(OH)D3 SERPL-MCNC: 24.6 NG/ML (ref 30–100)
ALBUMIN SERPL-MCNC: 3.4 G/DL (ref 3.5–5)
ALBUMIN/GLOB SERPL: 0.7 {RATIO} (ref 1.1–2.2)
ALP SERPL-CCNC: 44 U/L (ref 45–117)
ALT SERPL-CCNC: 117 U/L (ref 12–78)
ANION GAP SERPL CALC-SCNC: 10 MMOL/L (ref 5–15)
APPEARANCE UR: CLEAR
ARTERIAL PATENCY WRIST A: ABNORMAL
AST SERPL-CCNC: 127 U/L (ref 15–37)
ATRIAL RATE: 115 BPM
ATRIAL RATE: 117 BPM
B-OH-BUTYR SERPL-SCNC: 2.21 MMOL/L
BACTERIA URNS QL MICRO: ABNORMAL /HPF
BASE DEFICIT BLD-SCNC: 2 MMOL/L
BASOPHILS # BLD: 0 K/UL (ref 0–0.1)
BASOPHILS NFR BLD: 0 % (ref 0–1)
BDY SITE: ABNORMAL
BILIRUB SERPL-MCNC: 1.1 MG/DL (ref 0.2–1)
BILIRUB UR QL: NEGATIVE
BNP SERPL-MCNC: 31 PG/ML
BUN SERPL-MCNC: 23 MG/DL (ref 6–20)
BUN/CREAT SERPL: 16 (ref 12–20)
CA-I BLD-SCNC: 1.1 MMOL/L (ref 1.12–1.32)
CALCIUM SERPL-MCNC: 8.3 MG/DL (ref 8.5–10.1)
CALCULATED P AXIS, ECG09: 39 DEGREES
CALCULATED P AXIS, ECG09: 53 DEGREES
CALCULATED R AXIS, ECG10: -72 DEGREES
CALCULATED R AXIS, ECG10: -79 DEGREES
CALCULATED T AXIS, ECG11: 23 DEGREES
CALCULATED T AXIS, ECG11: 49 DEGREES
CHLORIDE SERPL-SCNC: 93 MMOL/L (ref 97–108)
CO2 SERPL-SCNC: 23 MMOL/L (ref 21–32)
COLOR UR: ABNORMAL
COMMENT, HOLDF: NORMAL
COMMENT, HOLDF: NORMAL
COVID-19 RAPID TEST, COVR: DETECTED
CREAT SERPL-MCNC: 1.41 MG/DL (ref 0.7–1.3)
DIAGNOSIS, 93000: NORMAL
DIAGNOSIS, 93000: NORMAL
DIFFERENTIAL METHOD BLD: ABNORMAL
EOSINOPHIL # BLD: 0 K/UL (ref 0–0.4)
EOSINOPHIL NFR BLD: 0 % (ref 0–7)
EPITH CASTS URNS QL MICRO: ABNORMAL /LPF
ERYTHROCYTE [DISTWIDTH] IN BLOOD BY AUTOMATED COUNT: 11.9 % (ref 11.5–14.5)
FERRITIN SERPL-MCNC: 1058 NG/ML (ref 26–388)
GAS FLOW.O2 O2 DELIVERY SYS: ABNORMAL L/MIN
GLOBULIN SER CALC-MCNC: 4.8 G/DL (ref 2–4)
GLUCOSE BLD STRIP.AUTO-MCNC: 124 MG/DL (ref 65–100)
GLUCOSE BLD STRIP.AUTO-MCNC: 425 MG/DL (ref 65–100)
GLUCOSE SERPL-MCNC: 397 MG/DL (ref 65–100)
GLUCOSE UR STRIP.AUTO-MCNC: >1000 MG/DL
HCO3 BLD-SCNC: 21.4 MMOL/L (ref 22–26)
HCT VFR BLD AUTO: 36.7 % (ref 36.6–50.3)
HGB BLD-MCNC: 12.7 G/DL (ref 12.1–17)
HGB UR QL STRIP: ABNORMAL
IMM GRANULOCYTES # BLD AUTO: 0 K/UL (ref 0–0.04)
IMM GRANULOCYTES NFR BLD AUTO: 0 % (ref 0–0.5)
KETONES UR QL STRIP.AUTO: 80 MG/DL
LACTATE SERPL-SCNC: 1.6 MMOL/L (ref 0.4–2)
LACTATE SERPL-SCNC: 1.6 MMOL/L (ref 0.4–2)
LDH SERPL L TO P-CCNC: 459 U/L (ref 85–241)
LEUKOCYTE ESTERASE UR QL STRIP.AUTO: NEGATIVE
LYMPHOCYTES # BLD: 0.9 K/UL (ref 0.8–3.5)
LYMPHOCYTES NFR BLD: 23 % (ref 12–49)
MAGNESIUM SERPL-MCNC: 1.8 MG/DL (ref 1.6–2.4)
MCH RBC QN AUTO: 27.9 PG (ref 26–34)
MCHC RBC AUTO-ENTMCNC: 34.6 G/DL (ref 30–36.5)
MCV RBC AUTO: 80.5 FL (ref 80–99)
MONOCYTES # BLD: 0.4 K/UL (ref 0–1)
MONOCYTES NFR BLD: 11 % (ref 5–13)
NEUTS SEG # BLD: 2.5 K/UL (ref 1.8–8)
NEUTS SEG NFR BLD: 66 % (ref 32–75)
NITRITE UR QL STRIP.AUTO: NEGATIVE
NRBC # BLD: 0 K/UL (ref 0–0.01)
NRBC BLD-RTO: 0 PER 100 WBC
P-R INTERVAL, ECG05: 140 MS
P-R INTERVAL, ECG05: 142 MS
PCO2 BLD: 30.3 MMHG (ref 35–45)
PH BLD: 7.46 [PH] (ref 7.35–7.45)
PH UR STRIP: 5.5 [PH] (ref 5–8)
PHOSPHATE SERPL-MCNC: 2.8 MG/DL (ref 2.6–4.7)
PLATELET # BLD AUTO: 136 K/UL (ref 150–400)
PMV BLD AUTO: 10.1 FL (ref 8.9–12.9)
PO2 BLD: 53 MMHG (ref 80–100)
POTASSIUM SERPL-SCNC: 3.6 MMOL/L (ref 3.5–5.1)
PROT SERPL-MCNC: 8.2 G/DL (ref 6.4–8.2)
PROT UR STRIP-MCNC: 100 MG/DL
Q-T INTERVAL, ECG07: 318 MS
Q-T INTERVAL, ECG07: 332 MS
QRS DURATION, ECG06: 88 MS
QRS DURATION, ECG06: 92 MS
QTC CALCULATION (BEZET), ECG08: 443 MS
QTC CALCULATION (BEZET), ECG08: 459 MS
RBC # BLD AUTO: 4.56 M/UL (ref 4.1–5.7)
RBC #/AREA URNS HPF: ABNORMAL /HPF (ref 0–5)
SAMPLES BEING HELD,HOLD: NORMAL
SAMPLES BEING HELD,HOLD: NORMAL
SAO2 % BLD: 89 % (ref 92–97)
SERVICE CMNT-IMP: ABNORMAL
SERVICE CMNT-IMP: ABNORMAL
SODIUM SERPL-SCNC: 126 MMOL/L (ref 136–145)
SOURCE, COVRS: ABNORMAL
SP GR UR REFRACTOMETRY: 1.03 (ref 1–1.03)
SPECIMEN TYPE: ABNORMAL
TROPONIN I SERPL-MCNC: 0.05 NG/ML
TROPONIN I SERPL-MCNC: <0.05 NG/ML
UR CULT HOLD, URHOLD: NORMAL
UROBILINOGEN UR QL STRIP.AUTO: 0.2 EU/DL (ref 0.2–1)
VENTRICULAR RATE, ECG03: 115 BPM
VENTRICULAR RATE, ECG03: 117 BPM
WBC # BLD AUTO: 3.9 K/UL (ref 4.1–11.1)
WBC URNS QL MICRO: ABNORMAL /HPF (ref 0–4)

## 2021-04-12 PROCEDURE — 93005 ELECTROCARDIOGRAM TRACING: CPT

## 2021-04-12 PROCEDURE — 74011250637 HC RX REV CODE- 250/637: Performed by: INTERNAL MEDICINE

## 2021-04-12 PROCEDURE — 99285 EMERGENCY DEPT VISIT HI MDM: CPT

## 2021-04-12 PROCEDURE — 82010 KETONE BODYS QUAN: CPT

## 2021-04-12 PROCEDURE — 82728 ASSAY OF FERRITIN: CPT

## 2021-04-12 PROCEDURE — 83605 ASSAY OF LACTIC ACID: CPT

## 2021-04-12 PROCEDURE — 87040 BLOOD CULTURE FOR BACTERIA: CPT

## 2021-04-12 PROCEDURE — 80053 COMPREHEN METABOLIC PANEL: CPT

## 2021-04-12 PROCEDURE — 36415 COLL VENOUS BLD VENIPUNCTURE: CPT

## 2021-04-12 PROCEDURE — 74011250637 HC RX REV CODE- 250/637: Performed by: EMERGENCY MEDICINE

## 2021-04-12 PROCEDURE — 82962 GLUCOSE BLOOD TEST: CPT

## 2021-04-12 PROCEDURE — 84100 ASSAY OF PHOSPHORUS: CPT

## 2021-04-12 PROCEDURE — 83735 ASSAY OF MAGNESIUM: CPT

## 2021-04-12 PROCEDURE — 87635 SARS-COV-2 COVID-19 AMP PRB: CPT

## 2021-04-12 PROCEDURE — 71046 X-RAY EXAM CHEST 2 VIEWS: CPT

## 2021-04-12 PROCEDURE — 94761 N-INVAS EAR/PLS OXIMETRY MLT: CPT

## 2021-04-12 PROCEDURE — 65660000000 HC RM CCU STEPDOWN

## 2021-04-12 PROCEDURE — 83880 ASSAY OF NATRIURETIC PEPTIDE: CPT

## 2021-04-12 PROCEDURE — 82306 VITAMIN D 25 HYDROXY: CPT

## 2021-04-12 PROCEDURE — 74011250636 HC RX REV CODE- 250/636: Performed by: INTERNAL MEDICINE

## 2021-04-12 PROCEDURE — 85025 COMPLETE CBC W/AUTO DIFF WBC: CPT

## 2021-04-12 PROCEDURE — 81001 URINALYSIS AUTO W/SCOPE: CPT

## 2021-04-12 PROCEDURE — 82803 BLOOD GASES ANY COMBINATION: CPT

## 2021-04-12 PROCEDURE — 74011000258 HC RX REV CODE- 258: Performed by: INTERNAL MEDICINE

## 2021-04-12 PROCEDURE — 83615 LACTATE (LD) (LDH) ENZYME: CPT

## 2021-04-12 PROCEDURE — 74176 CT ABD & PELVIS W/O CONTRAST: CPT

## 2021-04-12 PROCEDURE — 84484 ASSAY OF TROPONIN QUANT: CPT

## 2021-04-12 RX ORDER — THERA TABS 400 MCG
1 TAB ORAL DAILY
Status: DISCONTINUED | OUTPATIENT
Start: 2021-04-13 | End: 2021-04-23 | Stop reason: HOSPADM

## 2021-04-12 RX ORDER — SODIUM CHLORIDE 0.9 % (FLUSH) 0.9 %
5-40 SYRINGE (ML) INJECTION EVERY 8 HOURS
Status: DISCONTINUED | OUTPATIENT
Start: 2021-04-12 | End: 2021-04-23 | Stop reason: HOSPADM

## 2021-04-12 RX ORDER — MAGNESIUM SULFATE 100 %
4 CRYSTALS MISCELLANEOUS AS NEEDED
Status: DISCONTINUED | OUTPATIENT
Start: 2021-04-12 | End: 2021-04-23 | Stop reason: HOSPADM

## 2021-04-12 RX ORDER — ACETAMINOPHEN 325 MG/1
650 TABLET ORAL
Status: DISCONTINUED | OUTPATIENT
Start: 2021-04-12 | End: 2021-04-23 | Stop reason: HOSPADM

## 2021-04-12 RX ORDER — SODIUM CHLORIDE 0.9 % (FLUSH) 0.9 %
5-40 SYRINGE (ML) INJECTION AS NEEDED
Status: DISCONTINUED | OUTPATIENT
Start: 2021-04-12 | End: 2021-04-23 | Stop reason: HOSPADM

## 2021-04-12 RX ORDER — ACETAMINOPHEN 650 MG/1
650 SUPPOSITORY RECTAL
Status: DISCONTINUED | OUTPATIENT
Start: 2021-04-12 | End: 2021-04-23 | Stop reason: HOSPADM

## 2021-04-12 RX ORDER — ACETAMINOPHEN 500 MG
1000 TABLET ORAL ONCE
Status: COMPLETED | OUTPATIENT
Start: 2021-04-12 | End: 2021-04-12

## 2021-04-12 RX ORDER — ONDANSETRON 2 MG/ML
4 INJECTION INTRAMUSCULAR; INTRAVENOUS
Status: DISCONTINUED | OUTPATIENT
Start: 2021-04-12 | End: 2021-04-23 | Stop reason: HOSPADM

## 2021-04-12 RX ORDER — DEXAMETHASONE SODIUM PHOSPHATE 4 MG/ML
6 INJECTION, SOLUTION INTRA-ARTICULAR; INTRALESIONAL; INTRAMUSCULAR; INTRAVENOUS; SOFT TISSUE EVERY 24 HOURS
Status: DISCONTINUED | OUTPATIENT
Start: 2021-04-12 | End: 2021-04-13

## 2021-04-12 RX ORDER — ENOXAPARIN SODIUM 100 MG/ML
30 INJECTION SUBCUTANEOUS EVERY 12 HOURS
Status: DISCONTINUED | OUTPATIENT
Start: 2021-04-12 | End: 2021-04-12 | Stop reason: DRUGHIGH

## 2021-04-12 RX ORDER — SODIUM CHLORIDE 9 MG/ML
75 INJECTION, SOLUTION INTRAVENOUS CONTINUOUS
Status: DISPENSED | OUTPATIENT
Start: 2021-04-12 | End: 2021-04-13

## 2021-04-12 RX ORDER — DEXTROSE 50 % IN WATER (D50W) INTRAVENOUS SYRINGE
25-50 AS NEEDED
Status: DISCONTINUED | OUTPATIENT
Start: 2021-04-12 | End: 2021-04-23 | Stop reason: HOSPADM

## 2021-04-12 RX ORDER — GUAIFENESIN/DEXTROMETHORPHAN 100-10MG/5
5 SYRUP ORAL
Status: DISCONTINUED | OUTPATIENT
Start: 2021-04-12 | End: 2021-04-23 | Stop reason: HOSPADM

## 2021-04-12 RX ORDER — INSULIN LISPRO 100 [IU]/ML
INJECTION, SOLUTION INTRAVENOUS; SUBCUTANEOUS EVERY 4 HOURS
Status: DISCONTINUED | OUTPATIENT
Start: 2021-04-13 | End: 2021-04-13 | Stop reason: ALTCHOICE

## 2021-04-12 RX ORDER — ENOXAPARIN SODIUM 100 MG/ML
40 INJECTION SUBCUTANEOUS EVERY 12 HOURS
Status: DISCONTINUED | OUTPATIENT
Start: 2021-04-12 | End: 2021-04-23 | Stop reason: HOSPADM

## 2021-04-12 RX ORDER — MELATONIN
2000 DAILY
Status: DISCONTINUED | OUTPATIENT
Start: 2021-04-13 | End: 2021-04-23 | Stop reason: HOSPADM

## 2021-04-12 RX ORDER — POLYETHYLENE GLYCOL 3350 17 G/17G
17 POWDER, FOR SOLUTION ORAL DAILY PRN
Status: DISCONTINUED | OUTPATIENT
Start: 2021-04-12 | End: 2021-04-23 | Stop reason: HOSPADM

## 2021-04-12 RX ORDER — PROMETHAZINE HYDROCHLORIDE 25 MG/1
12.5 TABLET ORAL
Status: DISCONTINUED | OUTPATIENT
Start: 2021-04-12 | End: 2021-04-23 | Stop reason: HOSPADM

## 2021-04-12 RX ORDER — SODIUM CHLORIDE 0.9 % (FLUSH) 0.9 %
5-10 SYRINGE (ML) INJECTION AS NEEDED
Status: DISCONTINUED | OUTPATIENT
Start: 2021-04-12 | End: 2021-04-23 | Stop reason: HOSPADM

## 2021-04-12 RX ORDER — BENZONATATE 100 MG/1
200 CAPSULE ORAL
Status: DISCONTINUED | OUTPATIENT
Start: 2021-04-12 | End: 2021-04-23 | Stop reason: HOSPADM

## 2021-04-12 RX ADMIN — SODIUM CHLORIDE 75 ML/HR: 9 INJECTION, SOLUTION INTRAVENOUS at 17:15

## 2021-04-12 RX ADMIN — CEFTRIAXONE SODIUM 1 G: 1 INJECTION, POWDER, FOR SOLUTION INTRAMUSCULAR; INTRAVENOUS at 21:13

## 2021-04-12 RX ADMIN — ACETAMINOPHEN 1000 MG: 500 TABLET ORAL at 13:27

## 2021-04-12 RX ADMIN — AZITHROMYCIN MONOHYDRATE 500 MG: 500 INJECTION, POWDER, LYOPHILIZED, FOR SOLUTION INTRAVENOUS at 21:12

## 2021-04-12 RX ADMIN — ENOXAPARIN SODIUM 40 MG: 40 INJECTION SUBCUTANEOUS at 17:15

## 2021-04-12 RX ADMIN — DEXAMETHASONE SODIUM PHOSPHATE 6 MG: 4 INJECTION, SOLUTION INTRA-ARTICULAR; INTRALESIONAL; INTRAMUSCULAR; INTRAVENOUS; SOFT TISSUE at 17:15

## 2021-04-12 RX ADMIN — Medication 10 ML: at 21:18

## 2021-04-12 RX ADMIN — ACETAMINOPHEN 650 MG: 325 TABLET ORAL at 21:26

## 2021-04-12 NOTE — ROUTINE PROCESS
TRANSFER - OUT REPORT:    Verbal report given to receiving RN on [employer]  being transferred to [facility] for routine progression of care       Report consisted of patients Situation, Background, Assessment and   Recommendations(SBAR). Information from the following report(s) SBAR was reviewed with the receiving nurse. Lines:   Peripheral IV 04/12/21 Left Hand (Active)   Site Assessment Clean, dry, & intact 04/12/21 1107   Phlebitis Assessment 0 04/12/21 1107   Infiltration Assessment 0 04/12/21 1107   Dressing Status Clean, dry, & intact 04/12/21 1107   Dressing Type Transparent 04/12/21 1107        Opportunity for questions and clarification was provided.       Patient transported with:   [employer]

## 2021-04-12 NOTE — ED TRIAGE NOTES
Pt c/o n/v  5-6 days, denies fever or chills, +diarrhea, pt is insulin dependent diabetic , denies abd pain

## 2021-04-12 NOTE — PROGRESS NOTES
Admission Medication Reconciliation: In progress:    Unable to speak with patient face to face at this time due to general isolation precautions in the ED related to COVID-19 pandemic. Left voicemail for patient @ [phone], awaiting return call and will update once additional information becomes available. Chart notes and RX Query were used to update medication list thus far. Notes thus far:   Atorvastatin: chart notes indicate that patient was to take Lipitor three times weekly? Thank you for allowing me to participate in the care of your patient. Anselmo Ritter PharmD, RN # [phone]       [facility] pharmacy benefit data reflects medications filled and processed through the patient's insurance, however   this data does NOT capture whether the medication was picked up or is currently being taken by the patient. Allergies:  Lisinopril    Significant PMH/Disease States:   Past Medical History:   Diagnosis Date    DM (diabetes mellitus) ([facility] [address] Age 32    HLD (hyperlipidaemia)     HTN      Chief Complaint for this Admission:    Chief Complaint   Patient presents with    Vomiting     Prior to Admission Medications:   Prior to Admission Medications   Prescriptions Last Dose Informant Taking? Subcutaneous Insulin Pump Misc   No   Sig: by Does Not Apply route. aspirin 81 mg tablet   No   Sig: Take  by mouth daily.    atorvastatin (LIPITOR) 10 mg tablet   No   Sig: TAKE 1 TABLET DAILY   chlorthalidone (HYGROTEN) 25 mg tablet   No   Sig: TAKE 1 TABLET DAILY   clotrimazole (LOTRIMIN) 1 % external solution   No   Si drop BID to right ear for 14 days   insulin lispro (HumaLOG U-100 Insulin) 100 unit/mL injection   No   Sig: USE UP  UNITS DAILY AS DIRECTED VIA INSULIN PUMP.   losartan (COZAAR) 100 mg tablet   No   Sig: TAKE 1 TABLET DAILY   metFORMIN ER (GLUCOPHAGE XR) 500 mg tablet   No   Sig: TAKE 2 TABLETS WITH BREAKFAST AND DINNER   multivitamin (DAILY MULTI-VITAMIN) tablet   No   Sig: Take 1 Tab by mouth daily. Facility-Administered Medications: None     Please contact the main inpatient pharmacy with any questions or concerns at [phone] and we will direct you to the clinical pharmacist covering this patient's care while in-house.    ROMEO Jackman

## 2021-04-12 NOTE — ED PROVIDER NOTES
30-year-old male with a history of diabetes, hyperlipidemia and hypertension presents with a chief complaint of diarrhea. Patient reports that he has had 4 days of watery diarrhea. He also endorses some abdominal pain but denies shortness of breath, chest pain, fevers, chills or other symptoms. Past Medical History:   Diagnosis Date    DM (diabetes mellitus) ([facility] [address] Age 32    HLD (hyperlipidaemia)     HTN        Past Surgical History:   Procedure Laterality Date    HX ORTHOPAEDIC Left 1998    pins in toes of foot    HX REFRACTIVE SURGERY           Family History:   Problem Relation Age of Onset    Diabetes Mother     Heart Attack Father 61    Diabetes Maternal Grandmother     Diabetes Maternal Grandfather     Diabetes Paternal Grandmother     Diabetes Paternal Grandfather        Social History     Socioeconomic History    Marital status:      Spouse name: Not on file    Number of children: Not on file    Years of education: Not on file    Highest education level: Not on file   Occupational History    Not on file   Social Needs    Financial resource strain: Not on file    Food insecurity     Worry: Not on file     Inability: Not on file    Transportation needs     Medical: Not on file     Non-medical: Not on file   Tobacco Use    Smoking status: Never Smoker    Smokeless tobacco: Never Used   Substance and Sexual Activity    Alcohol use:  Yes     Alcohol/week: 0.0 standard drinks     Comment: glass of wine 1-2 times a month    Drug use: No    Sexual activity: Yes     Partners: Female   Lifestyle    Physical activity     Days per week: Not on file     Minutes per session: Not on file    Stress: Not on file   Relationships    Social connections     Talks on phone: Not on file     Gets together: Not on file     Attends Orthodoxy service: Not on file     Active member of club or organization: Not on file     Attends meetings of clubs or organizations: Not on file Relationship status: Not on file    Intimate partner violence     Fear of current or ex partner: Not on file     Emotionally abused: Not on file     Physically abused: Not on file     Forced sexual activity: Not on file   Other Topics Concern    Not on file   Social History Narrative    Lives in [address] [place] [address],[address] with wife and 24 yo son, 15 yo son, and 7 yo daughter. Works as a  for [employer]. Likes to fish. ALLERGIES: Lisinopril    Review of Systems   Constitutional: Negative for fever. HENT: Negative for rhinorrhea. Respiratory: Negative for cough. Cardiovascular: Negative for chest pain. Gastrointestinal: Positive for abdominal pain and diarrhea. Genitourinary: Negative for dysuria. Musculoskeletal: Negative for back pain. Skin: Negative for wound. Neurological: Negative for headaches. Psychiatric/Behavioral: Negative for confusion. Vitals:    04/12/21 0911   BP: (!) 162/74   Pulse: (!) 117   Resp: 24   Temp: 99.1 °F (37.3 °C)   SpO2: 94%            Physical Exam  Vitals signs and nursing note reviewed. Constitutional:       General: He is not in acute distress. Appearance: Normal appearance. He is not ill-appearing, toxic-appearing or diaphoretic. HENT:      Head: Normocephalic. Eyes:      Extraocular Movements: Extraocular movements intact. Neck:      Musculoskeletal: Normal range of motion. Cardiovascular:      Rate and Rhythm: Tachycardia present. Pulses: Normal pulses. Heart sounds: Normal heart sounds. Pulmonary:      Effort: Pulmonary effort is normal. No respiratory distress. Breath sounds: Rales present. Abdominal:      General: There is distension. Tenderness: There is abdominal tenderness. There is guarding. Musculoskeletal: Normal range of motion. Skin:     General: Skin is warm and dry. Capillary Refill: Capillary refill takes less than 2 seconds.    Neurological:      Mental Status: He is alert and oriented to person, place, and time. Psychiatric:         Mood and Affect: Mood normal.          MDM  Number of Diagnoses or Management Options  Dehydration  Elevated troponin  Suspected COVID-19 virus infection  Diagnosis management comments: 35-year-old male presents with diarrhea. He is tachycardic and has diffuse rales on exam.  He does denies shortness of breath, chest pain or fever; however, Covid is my #1 concern. Patient is also got abdominal tenderness and guarding. His CT abdomen pelvis without IV contrast will be obtained. Laboratory studies show positive for Covid. Troponin is slightly elevated and the patient will be admitted to the hospital for management of his Covid and evaluation of his elevated troponin. He is comfortable and agreeable with plan of care.     Perfect Serve Consult for Admission  2:25 PM    ED Room Number: ER27/27  Patient Name and age:  Abhijeet Clement. 39 y.o.  male  Working Diagnosis: Elevated troponin  (primary encounter diagnosis)  Dehydration  Suspected COVID-19 virus infection    COVID-19 Suspicion:  yes  Sepsis present:  no  Reassessment needed: no  Code Status:  Full Code  Readmission: no  Isolation Requirements:  no  Recommended Level of Care:  telemetry  Department:[facility] Adult ED - 21   Other:  covid pending           Amount and/or Complexity of Data Reviewed  Clinical lab tests: ordered and reviewed  Tests in the radiology section of CPT®: ordered and reviewed           Procedures

## 2021-04-13 LAB
ABO + RH BLD: NORMAL
ALBUMIN SERPL-MCNC: 3.2 G/DL (ref 3.5–5)
ALBUMIN/GLOB SERPL: 0.7 {RATIO} (ref 1.1–2.2)
ALP SERPL-CCNC: 40 U/L (ref 45–117)
ALT SERPL-CCNC: 155 U/L (ref 12–78)
ANION GAP SERPL CALC-SCNC: 11 MMOL/L (ref 5–15)
APTT PPP: 29.3 SEC (ref 22.1–31)
AST SERPL-CCNC: 167 U/L (ref 15–37)
BASOPHILS # BLD: 0 K/UL (ref 0–0.1)
BASOPHILS NFR BLD: 0 % (ref 0–1)
BILIRUB SERPL-MCNC: 0.8 MG/DL (ref 0.2–1)
BLOOD GROUP ANTIBODIES SERPL: NORMAL
BNP SERPL-MCNC: 24 PG/ML
BUN SERPL-MCNC: 28 MG/DL (ref 6–20)
BUN/CREAT SERPL: 24 (ref 12–20)
CALCIUM SERPL-MCNC: 8.8 MG/DL (ref 8.5–10.1)
CHLORIDE SERPL-SCNC: 98 MMOL/L (ref 97–108)
CO2 SERPL-SCNC: 21 MMOL/L (ref 21–32)
COMMENT, HOLDF: NORMAL
CREAT SERPL-MCNC: 1.16 MG/DL (ref 0.7–1.3)
CRP SERPL-MCNC: 10.6 MG/DL (ref 0–0.6)
CRP SERPL-MCNC: 12.3 MG/DL (ref 0–0.6)
D DIMER PPP FEU-MCNC: 0.57 MG/L FEU (ref 0–0.65)
DIFFERENTIAL METHOD BLD: ABNORMAL
EOSINOPHIL # BLD: 0 K/UL (ref 0–0.4)
EOSINOPHIL NFR BLD: 0 % (ref 0–7)
ERYTHROCYTE [DISTWIDTH] IN BLOOD BY AUTOMATED COUNT: 12 % (ref 11.5–14.5)
EST. AVERAGE GLUCOSE BLD GHB EST-MCNC: 223 MG/DL
FERRITIN SERPL-MCNC: 1304 NG/ML (ref 26–388)
GLOBULIN SER CALC-MCNC: 4.9 G/DL (ref 2–4)
GLUCOSE BLD STRIP.AUTO-MCNC: 147 MG/DL (ref 65–100)
GLUCOSE BLD STRIP.AUTO-MCNC: 187 MG/DL (ref 65–100)
GLUCOSE BLD STRIP.AUTO-MCNC: 336 MG/DL (ref 65–100)
GLUCOSE BLD STRIP.AUTO-MCNC: 363 MG/DL (ref 65–100)
GLUCOSE BLD STRIP.AUTO-MCNC: 412 MG/DL (ref 65–100)
GLUCOSE SERPL-MCNC: 178 MG/DL (ref 65–100)
HBA1C MFR BLD: 9.4 % (ref 4–5.6)
HCT VFR BLD AUTO: 36.6 % (ref 36.6–50.3)
HGB BLD-MCNC: 12.5 G/DL (ref 12.1–17)
IMM GRANULOCYTES # BLD AUTO: 0 K/UL (ref 0–0.04)
IMM GRANULOCYTES NFR BLD AUTO: 1 % (ref 0–0.5)
INR PPP: 1.1 (ref 0.9–1.1)
LYMPHOCYTES # BLD: 0.5 K/UL (ref 0.8–3.5)
LYMPHOCYTES NFR BLD: 12 % (ref 12–49)
MCH RBC QN AUTO: 27.6 PG (ref 26–34)
MCHC RBC AUTO-ENTMCNC: 34.2 G/DL (ref 30–36.5)
MCV RBC AUTO: 80.8 FL (ref 80–99)
MONOCYTES # BLD: 0.3 K/UL (ref 0–1)
MONOCYTES NFR BLD: 7 % (ref 5–13)
NEUTS SEG # BLD: 3.6 K/UL (ref 1.8–8)
NEUTS SEG NFR BLD: 80 % (ref 32–75)
NRBC # BLD: 0 K/UL (ref 0–0.01)
NRBC BLD-RTO: 0 PER 100 WBC
PLATELET # BLD AUTO: 148 K/UL (ref 150–400)
PMV BLD AUTO: 9.8 FL (ref 8.9–12.9)
POTASSIUM SERPL-SCNC: 3.6 MMOL/L (ref 3.5–5.1)
PROCALCITONIN SERPL-MCNC: 0.1 NG/ML
PROT SERPL-MCNC: 8.1 G/DL (ref 6.4–8.2)
PROTHROMBIN TIME: 11.5 SEC (ref 9–11.1)
RBC # BLD AUTO: 4.53 M/UL (ref 4.1–5.7)
RBC MORPH BLD: ABNORMAL
SAMPLES BEING HELD,HOLD: NORMAL
SERVICE CMNT-IMP: ABNORMAL
SODIUM SERPL-SCNC: 130 MMOL/L (ref 136–145)
SPECIMEN EXP DATE BLD: NORMAL
THERAPEUTIC RANGE,PTTT: NORMAL SECS (ref 58–77)
TROPONIN I SERPL-MCNC: <0.05 NG/ML
TROPONIN I SERPL-MCNC: <0.05 NG/ML
WBC # BLD AUTO: 4.4 K/UL (ref 4.1–11.1)

## 2021-04-13 PROCEDURE — 86900 BLOOD TYPING SEROLOGIC ABO: CPT

## 2021-04-13 PROCEDURE — 86140 C-REACTIVE PROTEIN: CPT

## 2021-04-13 PROCEDURE — 74011250637 HC RX REV CODE- 250/637: Performed by: INTERNAL MEDICINE

## 2021-04-13 PROCEDURE — 85379 FIBRIN DEGRADATION QUANT: CPT

## 2021-04-13 PROCEDURE — 74011250636 HC RX REV CODE- 250/636: Performed by: PHYSICIAN ASSISTANT

## 2021-04-13 PROCEDURE — 94660 CPAP INITIATION&MGMT: CPT

## 2021-04-13 PROCEDURE — 74011250636 HC RX REV CODE- 250/636: Performed by: INTERNAL MEDICINE

## 2021-04-13 PROCEDURE — 85730 THROMBOPLASTIN TIME PARTIAL: CPT

## 2021-04-13 PROCEDURE — 65660000000 HC RM CCU STEPDOWN

## 2021-04-13 PROCEDURE — 83036 HEMOGLOBIN GLYCOSYLATED A1C: CPT

## 2021-04-13 PROCEDURE — 5A09357 ASSISTANCE WITH RESPIRATORY VENTILATION, LESS THAN 24 CONSECUTIVE HOURS, CONTINUOUS POSITIVE AIRWAY PRESSURE: ICD-10-PCS | Performed by: HOSPITALIST

## 2021-04-13 PROCEDURE — 80053 COMPREHEN METABOLIC PANEL: CPT

## 2021-04-13 PROCEDURE — 82728 ASSAY OF FERRITIN: CPT

## 2021-04-13 PROCEDURE — 84145 PROCALCITONIN (PCT): CPT

## 2021-04-13 PROCEDURE — 99356 PR PROLONGED SVC I/P OR OBS SETTING 1ST HOUR: CPT | Performed by: CLINICAL NURSE SPECIALIST

## 2021-04-13 PROCEDURE — 83880 ASSAY OF NATRIURETIC PEPTIDE: CPT

## 2021-04-13 PROCEDURE — 36415 COLL VENOUS BLD VENIPUNCTURE: CPT

## 2021-04-13 PROCEDURE — 74011636637 HC RX REV CODE- 636/637: Performed by: INTERNAL MEDICINE

## 2021-04-13 PROCEDURE — 85610 PROTHROMBIN TIME: CPT

## 2021-04-13 PROCEDURE — 94760 N-INVAS EAR/PLS OXIMETRY 1: CPT

## 2021-04-13 PROCEDURE — 85025 COMPLETE CBC W/AUTO DIFF WBC: CPT

## 2021-04-13 PROCEDURE — 84484 ASSAY OF TROPONIN QUANT: CPT

## 2021-04-13 PROCEDURE — 99233 SBSQ HOSP IP/OBS HIGH 50: CPT | Performed by: CLINICAL NURSE SPECIALIST

## 2021-04-13 PROCEDURE — 82962 GLUCOSE BLOOD TEST: CPT

## 2021-04-13 RX ORDER — DEXTROSE 50 % IN WATER (D50W) INTRAVENOUS SYRINGE
12.5-25 AS NEEDED
Status: DISCONTINUED | OUTPATIENT
Start: 2021-04-13 | End: 2021-04-13 | Stop reason: SDUPTHER

## 2021-04-13 RX ADMIN — METHYLPREDNISOLONE SODIUM SUCCINATE 40 MG: 40 INJECTION, POWDER, FOR SOLUTION INTRAMUSCULAR; INTRAVENOUS at 21:51

## 2021-04-13 RX ADMIN — ENOXAPARIN SODIUM 40 MG: 40 INJECTION SUBCUTANEOUS at 04:06

## 2021-04-13 RX ADMIN — INSULIN LISPRO 10 UNITS: 100 INJECTION, SOLUTION INTRAVENOUS; SUBCUTANEOUS at 13:44

## 2021-04-13 RX ADMIN — INSULIN LISPRO 3 UNITS: 100 INJECTION, SOLUTION INTRAVENOUS; SUBCUTANEOUS at 09:31

## 2021-04-13 RX ADMIN — Medication 10 ML: at 13:44

## 2021-04-13 RX ADMIN — METHYLPREDNISOLONE SODIUM SUCCINATE 40 MG: 40 INJECTION, POWDER, FOR SOLUTION INTRAMUSCULAR; INTRAVENOUS at 13:44

## 2021-04-13 RX ADMIN — Medication 10 ML: at 06:35

## 2021-04-13 RX ADMIN — BENZONATATE 200 MG: 100 CAPSULE ORAL at 21:57

## 2021-04-13 RX ADMIN — ENOXAPARIN SODIUM 40 MG: 40 INJECTION SUBCUTANEOUS at 17:05

## 2021-04-13 RX ADMIN — Medication 2000 UNITS: at 09:31

## 2021-04-13 RX ADMIN — THERA TABS 1 TABLET: TAB at 09:31

## 2021-04-13 RX ADMIN — Medication 10 ML: at 21:51

## 2021-04-13 RX ADMIN — ACETAMINOPHEN 650 MG: 325 TABLET ORAL at 07:52

## 2021-04-13 RX ADMIN — ACETAMINOPHEN 650 MG: 325 TABLET ORAL at 17:05

## 2021-04-13 NOTE — PROGRESS NOTES
Pt does not have a bed. Has not had a bed all night. Been in contact with [facility], no luck on any beds in the hospital. Will continue to monitor. Problem: Diabetes Self-Management  Goal: *Incorporating nutritional management into lifestyle  Description: Describe effect of type, amount and timing of food on blood glucose; list 3 methods for planning meals. Outcome: Progressing Towards Goal  Goal: *Using medications safely  Description: State effect of diabetes medications on diabetes; name diabetes medication taking, action and side effects. Outcome: Progressing Towards Goal  Goal: *Monitoring blood glucose, interpreting and using results  Description: Identify recommended blood glucose targets  and personal targets. Outcome: Progressing Towards Goal     Problem: Falls - Risk of  Goal: *Absence of Falls  Description: Document Obdulia Nguyen Fall Risk and appropriate interventions in the flowsheet. Outcome: Progressing Towards Goal  Note: Fall Risk Interventions:            Medication Interventions: Patient to call before getting OOB, Teach patient to arise slowly    0730: Bedside and Verbal shift change report given to [address] (oncoming nurse) by Gurwinder Whitman RN (offgoing nurse).  Report included the following information SBAR, Kardex, ED Summary, Intake/Output, MAR, Recent Results and Cardiac Rhythm ST.

## 2021-04-13 NOTE — DIABETES MGMT
[facility]    CLINICAL NURSE SPECIALIST CONSULT     Initial Presentation   Miri Martinez is a 39 y.o. male who presents to the ED 4/12/21 with a 4 day complaint of diarrhea with abdominal pain. He had a rapid COVID-19 test which was positive    HX:   Past Medical History:   Diagnosis Date    DM (diabetes mellitus) ([facility] [address] Age 32    HLD (hyperlipidaemia)     HTN         DX: Gastritis, COVID-19 virus    TX: IV steroids/IV antibiotics     Hospital course   Clinical progress has been uncomplicated. Diabetes    Patient has known Type 2 diabetes, treated with Metformin and Humalog PTA. Family history positive for diabetes: Mother with Type 2 Diabetes     Admission  and A1c 9.3% indicate poor diabetes control. Ambulatory blood glucose management provided by endocrinologist: Jeermiah Potts MD with [facility].     Consulted by Adrian Rivera MD for advanced diabetes nursing assessment and care, specifically related to    [x] Inpatient management strategy      Diabetes-related medical history  Acute complications: Hyperglycemia  Neurological complications  Peripheral neuropathy  Microvascular disease: None  Macrovascular disease: None      Diabetes medication history  Drug class Currently in use Discontinued Never used   Biguanide Metformin 500mg at breakfast and dinner     DDP-4 inhibitor       Sulfonylurea      Thiazolidinedione      GLP-1 RA      SGLT-2 inhibitors      Basal insulin      Bolus insulin      Fixed Dose  Combinations        Pump settings:  - basal: 12a: 6.35 units/hr   - Carb ratio: 1:3  - sensitivity: 10  - target: 100-120  - active insulin time: 3 hr  Subjective   Type 2 Diabetes  Per last visit with Dr Cristela Slater (Endo): Did try doing carb counting with the [employer] fabienne after last visit but got out of the habit and still may not be counting carbs accurate to lead to higher post-prandial sugars that are keeping his A1c up.    Lives with his wife and kids  Works night shift for [employer]  A1C 9.3%    Patient reports the following home diabetes self-care practices:  Eating pattern  [x] Breakfast Skips  [x] Lunch  Spaghetti or baked chicken/veggie/rice  [x] Dinner  Same  [x] Bedtime   [x] Snacks Crackers  [x] Beverages Water, diet soda  Physical activity pattern  [x] Aerobic exercise None  Monitoring pattern   Was wearing Medtronic CGM with automode. Took sensor off a few weeks ago and is checking sugars 1-2 times daily. Glucose values are usually in the low 200s  Taking medications pattern  [x] In consistent administration: underestimating carbohydrate count and not covering for snacks    [x] Affordable    Social determinants of health impacting diabetes self-management practices   Concerned that you need to know more about how to stay healthy with diabetes     Glucose on admission 425  124 at bed and 187 fasting. Now 326  GFR 60  A1C 9.4%  Blood cultures with NGTD  Decadron 6mg once yesterday at [address] to methylpred 60mg Q6  Objective   Physical exam  General Alert, oriented and in no acute distress/ill-appearing. Conversant and cooperative. Vital Signs   Visit Vitals  /70   Pulse (!) 111   Temp (!) 100.5 °F (38.1 °C)   Resp (!) 33   Ht 5' 10\" (1.778 m)   Wt (!) 160.4 kg (353 lb 9.9 oz)   SpO2 93%   BMI 50.74 kg/m²     Skin  Warm and dry. Acanthosis noted along neckline. No lipohypertrophy or lipoatrophy noted at injection sites   Heart   Regular rate and rhythm.  No murmurs, rubs or gallops  Lungs  Clear to auscultation without rales or rhonchi  Extremities No foot wounds      Laboratory      CBC WITH AUTOMATED DIFF    Collection Time: 04/13/21  7:15 AM   Result Value Ref Range    WBC 4.4 4.1 - 11.1 K/uL    RBC 4.53 4.10 - 5.70 M/uL    HGB 12.5 12.1 - 17.0 g/dL    HCT 36.6 36.6 - 50.3 %    MCV 80.8 80.0 - 99.0 FL    MCH 27.6 26.0 - 34.0 PG    MCHC 34.2 30.0 - 36.5 g/dL    RDW 12.0 11.5 - 14.5 %    PLATELET 701 (L) 685 - 400 K/uL    MPV 9.8 8.9 - 12.9 FL    NRBC 0.0 0  WBC    ABSOLUTE NRBC 0.00 0.00 - 0.01 K/uL    NEUTROPHILS 80 (H) 32 - 75 %    LYMPHOCYTES 12 12 - 49 %    MONOCYTES 7 5 - 13 %    EOSINOPHILS 0 0 - 7 %    BASOPHILS 0 0 - 1 %    IMMATURE GRANULOCYTES 1 (H) 0.0 - 0.5 %    ABS. NEUTROPHILS 3.6 1.8 - 8.0 K/UL    ABS. LYMPHOCYTES 0.5 (L) 0.8 - 3.5 K/UL    ABS. MONOCYTES 0.3 0.0 - 1.0 K/UL    ABS. EOSINOPHILS 0.0 0.0 - 0.4 K/UL    ABS. BASOPHILS 0.0 0.0 - 0.1 K/UL    ABS. IMM. GRANS. 0.0 0.00 - 0.04 K/UL    DF SMEAR SCANNED      RBC COMMENTS NORMOCYTIC, NORMOCHROMIC           METABOLIC PANEL, COMPREHENSIVE    Collection Time: 04/13/21  7:15 AM   Result Value Ref Range    Sodium 130 (L) 136 - 145 mmol/L    Potassium 3.6 3.5 - 5.1 mmol/L    Chloride 98 97 - 108 mmol/L    CO2 21 21 - 32 mmol/L    Anion gap 11 5 - 15 mmol/L    Glucose 178 (H) 65 - 100 mg/dL    BUN 28 (H) 6 - 20 MG/DL    Creatinine 1.16 0.70 - 1.30 MG/DL    BUN/Creatinine ratio 24 (H) 12 - 20      GFR est AA >60 >60 ml/min/1.73m2    GFR est non-AA >60 >60 ml/min/1.73m2    Calcium 8.8 8.5 - 10.1 MG/DL    Bilirubin, total 0.8 0.2 - 1.0 MG/DL    ALT (SGPT) 155 (H) 12 - 78 U/L    AST (SGOT) 167 (H) 15 - 37 U/L    Alk. phosphatase 40 (L) 45 - 117 U/L    Protein, total 8.1 6.4 - 8.2 g/dL    Albumin 3.2 (L) 3.5 - 5.0 g/dL    Globulin 4.9 (H) 2.0 - 4.0 g/dL    A-G Ratio 0.7 (L) 1.1 - 2.2         Factors impacting BG management  Factor Dose Comments   Nutrition:  Carb-controlled meals     60 grams/meal      Drugs:  Steroids   Methylprednisolone 60mg Q6 Methylprednisolone Dosing  8mg of Methylprednisolone dose is equivalent to 0. 1units/kg NPH    16 of Methylprednisolone  dose is equivalent to    0.2units/kg NPH  24 of Methylprednisolone  dose is equivalent to 0.3units/kg NPH    36+ of Methylprednisolone  dose is equivalent to 0.4units/kg NPH   Infection/COVID-19  Hyperglycemia in the setting of COVID-19 infection is associated with a blunted immune response and delayed recovery. The subcutaneous insulin order set can be initiated to optimize his blood glucose control in the impatient setting. Blood glucose pattern        Assessment and Plan   Nursing Diagnosis Risk for unstable blood glucose pattern   Nursing Intervention Domain 525 Decision-making Support   Nursing Interventions Examined current inpatient diabetes control   Explored factors facilitating and impeding inpatient management  Identified self-management practices impeding diabetes control  Explored corrective strategies with patient and responsible inpatient provider   Informed patient of rational for insulin strategy while hospitalized     Evaluation   This  gentleman, with Type 2 diabetes, on very high dose insulin via Medtronic MiniMed insulin pump. did not achieve diabetes control prior to admission, as evidenced by admission BG of 397 and A1c of 9.3%. During this hospitalization, the patient has not achieved inpatient blood glucose target of 100-180mg/dl as insulin pump was stopped yesterday and no SQ basal insulin started. Several factors have played a role in blood glucose management including:  [x] Critical nature of illness state  [x] Glucocorticoid use    The Subcutaneous Insulin Order set (2267) has not been in use but insulin pump initiated just now. Hence, the next step in optimizing blood glucose control would be    [x]  Optimize insulin pump settings     Patient at very high risk for steroid induced hyperglycemia at current doses. Please keep watchful eye on glucose pattern and may need additional SQ insulin on top of pump settings. Recommendations   1. Patient awake and able to independently utilize insulin pump at PTA insulin settings  2. No additional SQ insulin at this time  3. POC glucose ACHS  4. Patient to bolus off the pump for all carbohydrate intake  5.  Patient to enter blood glucose into pump to deliver additional correctional insulin ACHS  6. RN to document all insulin doses given to patient off insulin pump in EMR (under LDA flow sheet)      If insulin pump needs to be removed back-up settings are:  Basal: 75 units NPH BID  Bolus: 1 unit Humalog for every 3 grams Carbs  Correctional: 1 unit Humalog for every 10 points over 200  Billing Code(s)   [x] [id number]   [x] 39444     Before making these care recommendations, I personally reviewed the hosptialization record, including laboratory and diagnostic data, medications and examined the patient at bedside (circumstances permitting).   Total minutes: 75    JOSÉ MIGUEL Hilton  Diabetes Clinical Nurse Specialist  [facility]  Access via [employer]

## 2021-04-13 NOTE — PROGRESS NOTES
Admission Medication Reconciliation:    Information obtained from:  Patient over the phone  RxQuery data available¹:  YES    Comments/Recommendations: Updated PTA meds/reviewed patient's allergies. 1)  Interviewed patient over the phone- reliable historian; rx query confirmed doses    2)  Medication changes (since last review): Added  - lipitor    Removed  - clotrimazole cream    Uses insulin pump and has all supplies available except for batteries (son to bring them in)         [facility] benefit data reflects medications filled and processed through the patient's insurance, however   this data does NOT capture whether the medication was picked up or is currently being taken by the patient. Allergies:  Lisinopril    Significant PMH/Disease States:   Past Medical History:   Diagnosis Date    DM (diabetes mellitus) ([facility] [address] Age 32    HLD (hyperlipidaemia)     HTN      Chief Complaint for this Admission:    Chief Complaint   Patient presents with    Vomiting     Prior to Admission Medications:   Prior to Admission Medications   Prescriptions Last Dose Informant Taking?   aspirin 81 mg tablet   Yes   Sig: Take 81 mg by mouth daily. atorvastatin (LIPITOR) 10 mg tablet   Yes   Sig: TAKE 1 TABLET DAILY   chlorthalidone (HYGROTEN) 25 mg tablet   Yes   Sig: TAKE 1 TABLET DAILY   insulin pump (PATIENT SUPPLIED) misc   Yes   Sig: by SubCUTAneous route continuous. losartan (COZAAR) 100 mg tablet   Yes   Sig: TAKE 1 TABLET DAILY   metFORMIN ER (GLUCOPHAGE XR) 500 mg tablet   Yes   Sig: TAKE 2 TABLETS WITH BREAKFAST AND DINNER   multivitamin (DAILY MULTI-VITAMIN) tablet   Yes   Sig: Take 1 Tab by mouth daily. Facility-Administered Medications: None     Please contact the main inpatient pharmacy with any questions or concerns at [phone] and we will direct you to the clinical pharmacist covering this patient's care while in-house.    Raheel Yepez, BIBIANAD

## 2021-04-13 NOTE — PROGRESS NOTES
Transition of Care Plan   RUR- Low 17%   DISPOSITION: The disposition plan is home with family assistance; pending medical progression   F/U with PCP/Specialist     Transport: Son           Reason for Admission:  Acute respiratory failure                      RUR Score:        17%     Plan for utilizing home health:      No skilled needs at this time     PCP: First and Last name:  Loren Mora MD     Name of Practice:    Are you a current patient: Yes/No:    Approximate date of last visit:    Can you participate in a virtual visit with your PCP:                     Current Advanced Directive/Advance Care Plan: Full Code      Healthcare Decision Maker:   Click here to complete [address] including selection of the Healthcare Decision Maker Relationship (ie \"Primary\")                             Transition of Care Plan:                   The completed the initial evaluation by phone due to the pt being on isolation. The pt presented as aox4. The pt confirmed her demographics, insurance provider and PCP. The pt identified that he is currently employed by [employer]. The pt has the listed DME: Insulin pump, glucometer, and BP cuff. The pt does not have a hx of HH, IPR, or SNF. The pt stated that he utilizes [facility] to fill his scripts. The pt's disposition is home pending medical clearance. The pt stated that his son will provide DC at DC. The cm will continue to follow the pt's HARPAL needs. Care Management Interventions  PCP Verified by CM: Yes  Mode of Transport at Discharge:  Other (see comment)(Son)  Transition of Care Consult (CM Consult): Discharge Planning  MyChart Signup: Yes  Discharge Durable Medical Equipment: No(Bp cuff, insulin pump, glucometer )  Physical Therapy Consult: No  Occupational Therapy Consult: No  Speech Therapy Consult: No  Current Support Network: Lives with Spouse, Family Lives Nearby  Confirm Follow Up Transport: Self  Discharge Location  Discharge Placement: Home  CM: [address]. MSW,   [phone]

## 2021-04-13 NOTE — PROGRESS NOTES
Problem: Diabetes Self-Management  Goal: *Disease process and treatment process  Description: Define diabetes and identify own type of diabetes; list 3 options for treating diabetes. Outcome: Progressing Towards Goal  Goal: *Incorporating nutritional management into lifestyle  Description: Describe effect of type, amount and timing of food on blood glucose; list 3 methods for planning meals. Outcome: Progressing Towards Goal  Goal: *Incorporating physical activity into lifestyle  Description: State effect of exercise on blood glucose levels. Outcome: Progressing Towards Goal  Goal: *Developing strategies to promote health/change behavior  Description: Define the ABC's of diabetes; identify appropriate screenings, schedule and personal plan for screenings. Outcome: Progressing Towards Goal  Goal: *Using medications safely  Description: State effect of diabetes medications on diabetes; name diabetes medication taking, action and side effects. Outcome: Progressing Towards Goal  Goal: *Monitoring blood glucose, interpreting and using results  Description: Identify recommended blood glucose targets  and personal targets. Outcome: Progressing Towards Goal  Goal: *Prevention, detection, treatment of acute complications  Description: List symptoms of hyper- and hypoglycemia; describe how to treat low blood sugar and actions for lowering  high blood glucose level. Outcome: Progressing Towards Goal  Goal: *Prevention, detection and treatment of chronic complications  Description: Define the natural course of diabetes and describe the relationship of blood glucose levels to long term complications of diabetes.   Outcome: Progressing Towards Goal  Goal: *Developing strategies to address psychosocial issues  Description: Describe feelings about living with diabetes; identify support needed and support network  Outcome: Progressing Towards Goal  Goal: *Insulin pump training  Outcome: Progressing Towards Goal  Goal: *Sick day guidelines  Outcome: Progressing Towards Goal  Goal: *Patient Specific Goal (EDIT GOAL, INSERT TEXT)  Outcome: Progressing Towards Goal     Problem: Falls - Risk of  Goal: *Absence of Falls  Description: Document Tomi Fall Risk and appropriate interventions in the flowsheet.   Outcome: Progressing Towards Goal  Note: Fall Risk Interventions:            Medication Interventions: Patient to call before getting OOB, Teach patient to arise slowly

## 2021-04-13 NOTE — H&P
[address] [facility] Adult  Hospitalist Group  History and Physical    Primary Care Provider: Kelly Champagne MD  Date of Service:  4/12/2021    Subjective: Lucy Sanford is a 39 y.o. male with PMH of DM 2 on insulin pump, HTN, HLD, SHALOM on CPAP at home nightly, came to ED for evaluation of diarrhea going on for 5 days. Watery stools, 3-5 BMs per day, not able to keep anything inside, without any associated nausea/ vomiting/ fever/ chills/ abdominal pain. Patient denied any sore throat, cough, congestion, dizziness, lightheadedness, loss of appetite, muscle ache, body ache, fatigue, tiredness, constipation or urinary trouble. In the ED, patient was evaluated with COVID-19 rapid testing which turned positive. Hospitalist team asked to admit the patient for further evaluation. Review of Systems:    A comprehensive review of systems was negative except for that written in the History of Present Illness. Past Medical History:   Diagnosis Date    DM (diabetes mellitus) ([address] Age 29    HLD (hyperlipidaemia)     HTN       Past Surgical History:   Procedure Laterality Date    HX ORTHOPAEDIC Left 1998    pins in toes of foot    HX REFRACTIVE SURGERY       Prior to Admission medications    Medication Sig Start Date End Date Taking?  Authorizing Provider   metFORMIN ER (GLUCOPHAGE XR) 500 mg tablet TAKE 2 TABLETS WITH BREAKFAST AND DINNER 3/31/21   Wilda Esparza MD   atorvastatin (LIPITOR) 10 mg tablet TAKE 1 TABLET DAILY 3/3/21   Kelly Champagne MD   clotrimazole (LOTRIMIN) 1 % external solution 1 drop BID to right ear for 14 days 12/1/20   Kelly Champagne MD   insulin lispro (HumaLOG U-100 Insulin) 100 unit/mL injection USE UP  UNITS DAILY AS DIRECTED VIA INSULIN PUMP. 11/23/20   Kelly Champagne MD   losartan (COZAAR) 100 mg tablet TAKE 1 TABLET DAILY 1/27/20   Kelly Champagne MD   chlorthalidone (HYGROTEN) 25 mg tablet TAKE 1 TABLET DAILY 1/27/20   Kelly Champagne MD   multivitamin (DAILY MULTI-VITAMIN) tablet Take 1 Tab by mouth daily. Provider, Historical   Subcutaneous Insulin Pump Misc by Does Not Apply route. 7/2/10   Milton Pearson MD   aspirin 81 mg tablet Take  by mouth daily. Provider, Historical     Allergies   Allergen Reactions    Lisinopril Cough      Family History   Problem Relation Age of Onset    Diabetes Mother     Heart Attack Father 61    Diabetes Maternal Grandmother     Diabetes Maternal Grandfather     Diabetes Paternal Grandmother     Diabetes Paternal Grandfather         SOCIAL HISTORY:  Patient resides at Home. Patient ambulates with no assistance. Smoking history: never  Alcohol history: Socially        Objective:     Visit Vitals  BP (!) 190/98   Pulse (!) 108   Temp 99.1 °F (37.3 °C)   Resp 17   Ht 5' 10\" (1.778 m)   Wt (!) 165.6 kg (365 lb)   SpO2 96%   BMI 52.37 kg/m²     Physical Exam:   General:          Alert, cooperative, no distress, appears stated age. Morbid obesity  HEENT:           Atraumatic, anicteric sclerae, pink conjunctivae                          No oral ulcers, mucosa moist, throat clear, dentition fair  Neck:               Supple, symmetrical  Lungs:             Clear to auscultation bilaterally with mildly reduced air entry all over, likely related to body habitus causing limited accuracy and exam.  No Wheezing or Rhonchi. No rales. Chest wall:      No tenderness  No Accessory muscle use. Heart:              Regular  rhythm,  No  murmur   No edema  Abdomen:        Soft, non-tender. Not distended. Bowel sounds normal  Extremities:     No cyanosis. No clubbing,                            Skin turgor normal, Capillary refill normal  Skin:                Not pale. Not Jaundiced  No rashes   Psych:             Not anxious or agitated.   Neurologic:      Alert, moves all extremities, answers questions appropriately and responds to commands     Cap refill: Brisk, less than 3 seconds  Pulses: 2+, symmetric in all extremities  Skin: Warm, dry, without rashes or lesions    ECG: Noted. No acute ST-T changes. LAFB +    Data Review: All diagnostic labs and studies have been reviewed. Radiology  Xr Chest Pa Lat    Result Date: 4/12/2021  Mild bilateral perihilar interstitial infiltrates are concerning for atypical viral pneumonia. Ct Abd Pelv Wo Cont    Result Date: 4/12/2021  1. No evidence of acute process in the abdomen or pelvis. No urolithiasis. 2. Hepatic steatosis. 3. Findings consistent with COVID pneumonia throughout the bilateral lower lungs. Recent Results (from the past 24 hour(s))   EKG, 12 LEAD, INITIAL    Collection Time: 04/12/21 10:40 AM   Result Value Ref Range    Ventricular Rate 115 BPM    Atrial Rate 115 BPM    P-R Interval 140 ms    QRS Duration 88 ms    Q-T Interval 332 ms    QTC Calculation (Bezet) 459 ms    Calculated P Axis 53 degrees    Calculated R Axis -79 degrees    Calculated T Axis 49 degrees    Diagnosis       Sinus tachycardia  Left anterior fascicular block  No previous ECGs available  Confirmed by Brijesh Ponce MD. ([id number]) on 4/12/2021 12:53:44 PM     GLUCOSE, POC    Collection Time: 04/12/21 10:54 AM   Result Value Ref Range    Glucose (POC) 425 (H) 65 - 100 mg/dL    Performed by [address]    Collection Time: 04/12/21 11:01 AM   Result Value Ref Range    SAMPLES BEING HELD 1blu,1red     COMMENT        Add-on orders for these samples will be processed based on acceptable specimen integrity and analyte stability, which may vary by analyte.    LACTIC ACID    Collection Time: 04/12/21 11:01 AM   Result Value Ref Range    Lactic acid 1.6 0.4 - 2.0 MMOL/L   VITAMIN D, 25 HYDROXY    Collection Time: 04/12/21 11:01 AM   Result Value Ref Range    Vitamin D 25-Hydroxy 24.6 (L) 30 - 100 ng/mL   CBC WITH AUTOMATED DIFF    Collection Time: 04/12/21 11:02 AM   Result Value Ref Range    WBC 3.9 (L) 4.1 - 11.1 K/uL    RBC 4.56 4.10 - 5.70 M/uL    HGB 12.7 12.1 - 17.0 g/dL    HCT 36.7 36.6 - 50.3 %    MCV 80.5 80.0 - 99.0 FL    MCH 27.9 26.0 - 34.0 PG    MCHC 34.6 30.0 - 36.5 g/dL    RDW 11.9 11.5 - 14.5 %    PLATELET 031 (L) 600 - 400 K/uL    MPV 10.1 8.9 - 12.9 FL    NRBC 0.0 0  WBC    ABSOLUTE NRBC 0.00 0.00 - 0.01 K/uL    NEUTROPHILS 66 32 - 75 %    LYMPHOCYTES 23 12 - 49 %    MONOCYTES 11 5 - 13 %    EOSINOPHILS 0 0 - 7 %    BASOPHILS 0 0 - 1 %    IMMATURE GRANULOCYTES 0 0.0 - 0.5 %    ABS. NEUTROPHILS 2.5 1.8 - 8.0 K/UL    ABS. LYMPHOCYTES 0.9 0.8 - 3.5 K/UL    ABS. MONOCYTES 0.4 0.0 - 1.0 K/UL    ABS. EOSINOPHILS 0.0 0.0 - 0.4 K/UL    ABS. BASOPHILS 0.0 0.0 - 0.1 K/UL    ABS. IMM. GRANS. 0.0 0.00 - 0.04 K/UL    DF AUTOMATED     METABOLIC PANEL, COMPREHENSIVE    Collection Time: 04/12/21 11:02 AM   Result Value Ref Range    Sodium 126 (L) 136 - 145 mmol/L    Potassium 3.6 3.5 - 5.1 mmol/L    Chloride 93 (L) 97 - 108 mmol/L    CO2 23 21 - 32 mmol/L    Anion gap 10 5 - 15 mmol/L    Glucose 397 (H) 65 - 100 mg/dL    BUN 23 (H) 6 - 20 MG/DL    Creatinine 1.41 (H) 0.70 - 1.30 MG/DL    BUN/Creatinine ratio 16 12 - 20      GFR est AA >60 >60 ml/min/1.73m2    GFR est non-AA 54 (L) >60 ml/min/1.73m2    Calcium 8.3 (L) 8.5 - 10.1 MG/DL    Bilirubin, total 1.1 (H) 0.2 - 1.0 MG/DL    ALT (SGPT) 117 (H) 12 - 78 U/L    AST (SGOT) 127 (H) 15 - 37 U/L    Alk.  phosphatase 44 (L) 45 - 117 U/L    Protein, total 8.2 6.4 - 8.2 g/dL    Albumin 3.4 (L) 3.5 - 5.0 g/dL    Globulin 4.8 (H) 2.0 - 4.0 g/dL    A-G Ratio 0.7 (L) 1.1 - 2.2     BETA-HYDROXYBUTYRATE    Collection Time: 04/12/21 11:02 AM   Result Value Ref Range    B-hydroxybutyrate 2.21 (H) <0.40 mmol/L   TROPONIN I    Collection Time: 04/12/21 11:02 AM   Result Value Ref Range    Troponin-I, Qt. 0.05 (H) <0.05 ng/mL   MAGNESIUM    Collection Time: 04/12/21 11:02 AM   Result Value Ref Range    Magnesium 1.8 1.6 - 2.4 mg/dL   PHOSPHORUS    Collection Time: 04/12/21 11:02 AM   Result Value Ref Range    Phosphorus 2.8 2.6 - 4.7 MG/DL   NT-PRO BNP    Collection Time: 04/12/21 11:02 AM   Result Value Ref Range    NT pro-BNP 31 <125 PG/ML   FERRITIN    Collection Time: 04/12/21 11:02 AM   Result Value Ref Range    Ferritin 1,058 (H) 26 - 388 NG/ML   LD    Collection Time: 04/12/21 11:02 AM   Result Value Ref Range     (H) 85 - 241 U/L   URINALYSIS W/MICROSCOPIC    Collection Time: 04/12/21 11:16 AM   Result Value Ref Range    Color YELLOW/STRAW      Appearance CLEAR CLEAR      Specific gravity 1.029 1.003 - 1.030      pH (UA) 5.5 5.0 - 8.0      Protein 100 (A) NEG mg/dL    Glucose >1,000 (A) NEG mg/dL    Ketone 80 (A) NEG mg/dL    Bilirubin Negative NEG      Blood LARGE (A) NEG      Urobilinogen 0.2 0.2 - 1.0 EU/dL    Nitrites Negative NEG      Leukocyte Esterase Negative NEG      WBC 5-10 0 - 4 /hpf    RBC 0-5 0 - 5 /hpf    Epithelial cells FEW FEW /lpf    Bacteria 4+ (A) NEG /hpf   URINE CULTURE HOLD SAMPLE    Collection Time: 04/12/21 11:16 AM    Specimen: Serum; Urine   Result Value Ref Range    Urine culture hold        Urine on hold in Microbiology dept for 2 days. If unpreserved urine is submitted, it cannot be used for addtional testing after 24 hours, recollection will be required.    POC EG7    Collection Time: 04/12/21 11:31 AM   Result Value Ref Range    Calcium, ionized (POC) 1.10 (L) 1.12 - 1.32 mmol/L    pH (POC) 7.46 (H) 7.35 - 7.45      pCO2 (POC) 30.3 (L) 35.0 - 45.0 MMHG    pO2 (POC) 53 (L) 80 - 100 MMHG    HCO3 (POC) 21.4 (L) 22 - 26 MMOL/L    Base deficit (POC) 2 mmol/L    sO2 (POC) 89 (L) 92 - 97 %    Site OTHER      Device: ROOM AIR      Allens test (POC) N/A      Specimen type (POC) VENOUS BLOOD     EKG, 12 LEAD, SUBSEQUENT    Collection Time: 04/12/21  1:25 PM   Result Value Ref Range    Ventricular Rate 117 BPM    Atrial Rate 117 BPM    P-R Interval 142 ms    QRS Duration 92 ms    Q-T Interval 318 ms    QTC Calculation (Bezarik) 443 ms    Calculated P Axis 39 degrees    Calculated R Axis -72 degrees    Calculated T Axis 23 degrees    Diagnosis       Sinus tachycardia  Left anterior fascicular block  When compared with ECG of 12-APR-2021 10:40,  No significant change was found  Confirmed by Brijesh Ponce MD. ([id number]) on 4/12/2021 8:25:23 PM     COVID-19 RAPID TEST    Collection Time: 04/12/21  1:35 PM   Result Value Ref Range    Specimen source Nasopharyngeal      COVID-19 rapid test Detected (AA) NOTD     LACTIC ACID    Collection Time: 04/12/21  1:35 PM   Result Value Ref Range    Lactic acid 1.6 0.4 - 2.0 MMOL/L   TROPONIN I    Collection Time: 04/12/21  5:39 PM   Result Value Ref Range    Troponin-I, Qt. <0.05 <0.05 ng/mL   SAMPLES BEING HELD    Collection Time: 04/12/21  5:39 PM   Result Value Ref Range    SAMPLES BEING HELD 1 johanna     COMMENT        Add-on orders for these samples will be processed based on acceptable specimen integrity and analyte stability, which may vary by analyte. Assessment:     Active Problems:    Hyponatremia (4/12/2021)      Acute respiratory failure with hypoxemia (HCC) (4/12/2021)      Pneumonia due to COVID-19 virus (4/12/2021)      #Diarrhea with acute viral syndrome, COVID-19 +  #Bilateral pneumonia due to COVID-19 virus  #Hyponatremia, related to dehydration  #Mild MARYCARMEN. Baseline creatinine around 1.1, and 1.41 on admission on 4/12. #Abnormal UA with hematuria, proteinuria and glycosuria  #DM2 with hyperglycemia, on insulin pump with Humalog  #HTN  #HLD  #SHALOM on CPAP at night  #LAFB on EKG    Plan:     -Admit to inpatient, telemetry  -IVF NS 1 L at 75 mL/h with caution given COVID-19  -Droplet plus isolation precautions  -Inflammatory markers, procalcitonin, D-dimer, CRP, ferritin, LDH  -IV Decadron 6 mg daily for 10 days  -O2 supplementation as needed  -IV Rocephin + azithromycin. DC if procalcitonin low  -Pulmonology consultation  -Insulin pump management per diabetes education team  -Patient's insulin pump battery low today. Son will bring tomorrow.   -Meanwhile Humalog SSI every 4 hours Accu-Cheks with resistant insulin response parameters  -Stool enteric pathogen eval.    CODE STATUS: Full code  DVT prophylaxis: Lovenox SQ per COVID-19 protocol  Emergency contact:    FUNCTIONAL STATUS PRIOR TO HOSPITALIZATION Ambulates Independently (including history of recent falls)     Signed By: Danelle Reaves MD     April 12, 2021

## 2021-04-13 NOTE — CONSULTS
Pulmonary, Critical Care, and Sleep Medicine~Consult Note    Name: Meseret Kirkland. MRN: [id number]   : 1975 Hospital: [facility]    Date: 2021 11:33 AM Admission: 2021     Impression Plan   1. COVID 19 + PNA  2. Diaerrha, suspect due to above   3. SHALOM, on home CPAP  4. DM II  5. HLD 1. Monitoring inflammatory markers   2. D dimer, probnp  3. Screen vit d and blood type   4. procal low. Stop abx  5. O2 titration above 90%   6. CRP elevated but not on supplemental O2. Change decadron to solu medrol. Not a candidate for actemra right now  7. On room air, not a candidate for remdesivir right now  8. lovenox bid dosing      Daily Progression:    Consult Note requested by hospitalist     Patient presented with increased abdominal pains and diarrhea for the past 5 days. No overt pulmonary complaints were noted. Currently on room air. No pervious pulmonary complaints noted. Still spiking fevers, but feeling better. I have reviewed the labs and previous days notes. Pertinent items are noted in HPI. Past Medical History:   Diagnosis Date    DM (diabetes mellitus) ([facility] [address] Age 29    HLD (hyperlipidaemia)     HTN       Past Surgical History:   Procedure Laterality Date    HX ORTHOPAEDIC Left 1998    pins in toes of foot    HX REFRACTIVE SURGERY        Prior to Admission medications    Medication Sig Start Date End Date Taking?  Authorizing Provider   metFORMIN ER (GLUCOPHAGE XR) 500 mg tablet TAKE 2 TABLETS WITH BREAKFAST AND DINNER 3/31/21   Zena Celeste MD   atorvastatin (LIPITOR) 10 mg tablet TAKE 1 TABLET DAILY 3/3/21   Rogelio Longo MD   clotrimazole (LOTRIMIN) 1 % external solution 1 drop BID to right ear for 14 days 20   Rogelio Longo MD   insulin lispro (HumaLOG U-100 Insulin) 100 unit/mL injection USE UP  UNITS DAILY AS DIRECTED VIA INSULIN PUMP. 20   Rogelio Longo MD   losartan (COZAAR) 100 mg tablet TAKE 1 TABLET DAILY 20   Bakari Escoto MD   chlorthalidone (HYGROTEN) 25 mg tablet TAKE 1 TABLET DAILY 20   Bakari Escoto MD   multivitamin (DAILY MULTI-VITAMIN) tablet Take 1 Tab by mouth daily. Provider, Historical   Subcutaneous Insulin Pump Misc by Does Not Apply route. 7/2/10   Bakari Escoto MD   aspirin 81 mg tablet Take  by mouth daily. Provider, Historical     Allergies   Allergen Reactions    Lisinopril Cough      Social History     Tobacco Use    Smoking status: Never Smoker    Smokeless tobacco: Never Used   Substance Use Topics    Alcohol use: Yes     Alcohol/week: 0.0 standard drinks     Comment: glass of wine 1-2 times a month      Family History   Problem Relation Age of Onset    Diabetes Mother     Heart Attack Father 61    Diabetes Maternal Grandmother     Diabetes Maternal Grandfather     Diabetes Paternal Grandmother     Diabetes Paternal Grandfather      OBJECTIVE:     Vital Signs:       Visit Vitals  BP (!) 140/69 (BP 1 Location: Right upper arm, BP Patient Position: Sitting)   Pulse 100   Temp 99 °F (37.2 °C)   Resp 26   Ht 5' 10\" (1.778 m)   Wt (!) 160.4 kg (353 lb 9.9 oz)   SpO2 94%   BMI 50.74 kg/m²      Temp (24hrs), Av.8 °F (37.7 °C), Min:98.9 °F (37.2 °C), Max:100.7 °F (38.2 °C)     Intake/Output:     Last shift: No intake/output data recorded.     Last 3 shifts:  1901 -  0700  In: 1200 [P.O.:1200]  Out: -           Intake/Output Summary (Last 24 hours) at 2021 1133  Last data filed at 2021 0405  Gross per 24 hour   Intake 1200 ml   Output --   Net 1200 ml       Physical Exam:                                        Exam Findings Other   General: No resp distress noted, appears stated age    HEENT:  No ulcers, JVD not elevated, no cervical LAD    Chest: No pectus deformity, normal chest rise b/l    HEART:  No visible thrills    Lungs:  Normal expansion     ABD: Soft/NT, non rigid mildly distended    EXT: No cyanosis/clubbing/edema, normal peripheral pulses    Skin: No rashes or ulcers, no mottling    Neuro: A/O x 3        Medications:  Current Facility-Administered Medications   Medication Dose Route Frequency    methylPREDNISolone (PF) (SOLU-MEDROL) injection 60 mg  60 mg IntraVENous Q6H    sodium chloride (NS) flush 5-10 mL  5-10 mL IntraVENous PRN    sodium chloride (NS) flush 5-40 mL  5-40 mL IntraVENous Q8H    sodium chloride (NS) flush 5-40 mL  5-40 mL IntraVENous PRN    acetaminophen (TYLENOL) tablet 650 mg  650 mg Oral Q6H PRN    Or    acetaminophen (TYLENOL) suppository 650 mg  650 mg Rectal Q6H PRN    polyethylene glycol (MIRALAX) packet 17 g  17 g Oral DAILY PRN    promethazine (PHENERGAN) tablet 12.5 mg  12.5 mg Oral Q6H PRN    Or    ondansetron (ZOFRAN) injection 4 mg  4 mg IntraVENous Q6H PRN    cholecalciferol (VITAMIN D3) (1000 Units /25 mcg) tablet 2,000 Units  2,000 Units Oral DAILY    guaiFENesin-dextromethorphan (ROBITUSSIN DM) 100-10 mg/5 mL syrup 5 mL  5 mL Oral Q4H PRN    benzonatate (TESSALON) capsule 200 mg  200 mg Oral TID PRN    therapeutic multivitamin (THERAGRAN) tablet 1 Tab  1 Tab Oral DAILY    enoxaparin (LOVENOX) injection 40 mg  40 mg SubCUTAneous Q12H    glucose chewable tablet 16 g  4 Tab Oral PRN    dextrose (D50W) injection syrg 12.5-25 g  25-50 mL IntraVENous PRN    glucagon (GLUCAGEN) injection 1 mg  1 mg IntraMUSCular PRN    insulin lispro (HUMALOG) injection   SubCUTAneous Q4H       Labs:  ABG Recent Labs     04/12/21  1131   PHI 7.46*   PCO2I 30.3*   PO2I 53*   HCO3I 21.4*   SO2I 89*        CBC Recent Labs     04/13/21  0715 04/12/21  1102   WBC 4.4 3.9*   HGB 12.5 12.7   HCT 36.6 36.7   * 136*   MCV 80.8 80.5   MCH 27.6 12.7        Metabolic  Panel Recent Labs     04/13/21  0715 04/12/21  1102   * 126*   K 3.6 3.6   CL 98 93*   CO2 21 23   * 397*   BUN 28* 23*   CREA 1.16 1.41*   CA 8.8 8.3*   MG  --  1.8   PHOS  --  2.8   ALB 3.2* 3.4*   * 117*   INR 1.1 --         Pertinent Labs                Yohana Portillo PA-C  4/13/2021

## 2021-04-14 ENCOUNTER — APPOINTMENT (OUTPATIENT)
Dept: GENERAL RADIOLOGY | Age: 46
DRG: 871 | End: 2021-04-14
Attending: INTERNAL MEDICINE
Payer: COMMERCIAL

## 2021-04-14 LAB
ALBUMIN SERPL-MCNC: 3.2 G/DL (ref 3.5–5)
ALBUMIN/GLOB SERPL: 0.7 {RATIO} (ref 1.1–2.2)
ALP SERPL-CCNC: 42 U/L (ref 45–117)
ALT SERPL-CCNC: 192 U/L (ref 12–78)
ANION GAP SERPL CALC-SCNC: 12 MMOL/L (ref 5–15)
AST SERPL-CCNC: 151 U/L (ref 15–37)
BASOPHILS # BLD: 0 K/UL (ref 0–0.1)
BASOPHILS NFR BLD: 0 % (ref 0–1)
BILIRUB SERPL-MCNC: 0.7 MG/DL (ref 0.2–1)
BNP SERPL-MCNC: 64 PG/ML
BUN SERPL-MCNC: 26 MG/DL (ref 6–20)
BUN/CREAT SERPL: 23 (ref 12–20)
CALCIUM SERPL-MCNC: 9.1 MG/DL (ref 8.5–10.1)
CHLORIDE SERPL-SCNC: 98 MMOL/L (ref 97–108)
CO2 SERPL-SCNC: 22 MMOL/L (ref 21–32)
COMMENT, HOLDF: NORMAL
CREAT SERPL-MCNC: 1.13 MG/DL (ref 0.7–1.3)
CRP SERPL-MCNC: 9.82 MG/DL (ref 0–0.6)
CRP SERPL-MCNC: 9.93 MG/DL (ref 0–0.6)
D DIMER PPP FEU-MCNC: 0.42 MG/L FEU (ref 0–0.65)
DIFFERENTIAL METHOD BLD: ABNORMAL
EOSINOPHIL # BLD: 0 K/UL (ref 0–0.4)
EOSINOPHIL NFR BLD: 0 % (ref 0–7)
ERYTHROCYTE [DISTWIDTH] IN BLOOD BY AUTOMATED COUNT: 12.1 % (ref 11.5–14.5)
FERRITIN SERPL-MCNC: 1265 NG/ML (ref 26–388)
GLOBULIN SER CALC-MCNC: 4.9 G/DL (ref 2–4)
GLUCOSE BLD STRIP.AUTO-MCNC: 259 MG/DL (ref 65–100)
GLUCOSE BLD STRIP.AUTO-MCNC: 300 MG/DL (ref 65–100)
GLUCOSE BLD STRIP.AUTO-MCNC: 334 MG/DL (ref 65–100)
GLUCOSE BLD STRIP.AUTO-MCNC: 370 MG/DL (ref 65–100)
GLUCOSE SERPL-MCNC: 305 MG/DL (ref 65–100)
HCT VFR BLD AUTO: 37.8 % (ref 36.6–50.3)
HGB BLD-MCNC: 12.6 G/DL (ref 12.1–17)
IMM GRANULOCYTES # BLD AUTO: 0.1 K/UL (ref 0–0.04)
IMM GRANULOCYTES NFR BLD AUTO: 1 % (ref 0–0.5)
LYMPHOCYTES # BLD: 0.5 K/UL (ref 0.8–3.5)
LYMPHOCYTES NFR BLD: 8 % (ref 12–49)
MCH RBC QN AUTO: 27.8 PG (ref 26–34)
MCHC RBC AUTO-ENTMCNC: 33.3 G/DL (ref 30–36.5)
MCV RBC AUTO: 83.4 FL (ref 80–99)
MONOCYTES # BLD: 0.4 K/UL (ref 0–1)
MONOCYTES NFR BLD: 6 % (ref 5–13)
NEUTS SEG # BLD: 4.9 K/UL (ref 1.8–8)
NEUTS SEG NFR BLD: 85 % (ref 32–75)
NRBC # BLD: 0 K/UL (ref 0–0.01)
NRBC BLD-RTO: 0 PER 100 WBC
PLATELET # BLD AUTO: 180 K/UL (ref 150–400)
PMV BLD AUTO: 10.5 FL (ref 8.9–12.9)
POTASSIUM SERPL-SCNC: 4 MMOL/L (ref 3.5–5.1)
PROCALCITONIN SERPL-MCNC: 0.1 NG/ML
PROT SERPL-MCNC: 8.1 G/DL (ref 6.4–8.2)
RBC # BLD AUTO: 4.53 M/UL (ref 4.1–5.7)
RBC MORPH BLD: ABNORMAL
SAMPLES BEING HELD,HOLD: NORMAL
SERVICE CMNT-IMP: ABNORMAL
SODIUM SERPL-SCNC: 132 MMOL/L (ref 136–145)
WBC # BLD AUTO: 5.9 K/UL (ref 4.1–11.1)

## 2021-04-14 PROCEDURE — 86140 C-REACTIVE PROTEIN: CPT

## 2021-04-14 PROCEDURE — 77010033678 HC OXYGEN DAILY

## 2021-04-14 PROCEDURE — 82962 GLUCOSE BLOOD TEST: CPT

## 2021-04-14 PROCEDURE — 71045 X-RAY EXAM CHEST 1 VIEW: CPT

## 2021-04-14 PROCEDURE — XW033H5 INTRODUCTION OF TOCILIZUMAB INTO PERIPHERAL VEIN, PERCUTANEOUS APPROACH, NEW TECHNOLOGY GROUP 5: ICD-10-PCS | Performed by: HOSPITALIST

## 2021-04-14 PROCEDURE — 85379 FIBRIN DEGRADATION QUANT: CPT

## 2021-04-14 PROCEDURE — 80053 COMPREHEN METABOLIC PANEL: CPT

## 2021-04-14 PROCEDURE — 74011000258 HC RX REV CODE- 258: Performed by: PHYSICIAN ASSISTANT

## 2021-04-14 PROCEDURE — 85025 COMPLETE CBC W/AUTO DIFF WBC: CPT

## 2021-04-14 PROCEDURE — 83880 ASSAY OF NATRIURETIC PEPTIDE: CPT

## 2021-04-14 PROCEDURE — 65660000000 HC RM CCU STEPDOWN

## 2021-04-14 PROCEDURE — 74011250636 HC RX REV CODE- 250/636: Performed by: PHYSICIAN ASSISTANT

## 2021-04-14 PROCEDURE — 74011250637 HC RX REV CODE- 250/637: Performed by: INTERNAL MEDICINE

## 2021-04-14 PROCEDURE — 82728 ASSAY OF FERRITIN: CPT

## 2021-04-14 PROCEDURE — 94660 CPAP INITIATION&MGMT: CPT

## 2021-04-14 PROCEDURE — 99233 SBSQ HOSP IP/OBS HIGH 50: CPT | Performed by: CLINICAL NURSE SPECIALIST

## 2021-04-14 PROCEDURE — 74011250636 HC RX REV CODE- 250/636: Performed by: INTERNAL MEDICINE

## 2021-04-14 PROCEDURE — 36415 COLL VENOUS BLD VENIPUNCTURE: CPT

## 2021-04-14 PROCEDURE — 84145 PROCALCITONIN (PCT): CPT

## 2021-04-14 RX ORDER — FUROSEMIDE 10 MG/ML
20 INJECTION INTRAMUSCULAR; INTRAVENOUS ONCE
Status: COMPLETED | OUTPATIENT
Start: 2021-04-14 | End: 2021-04-14

## 2021-04-14 RX ORDER — INSULIN LISPRO 100 [IU]/ML
INJECTION, SOLUTION INTRAVENOUS; SUBCUTANEOUS ONCE
Status: DISPENSED | OUTPATIENT
Start: 2021-04-14 | End: 2021-04-15

## 2021-04-14 RX ADMIN — Medication 10 ML: at 13:09

## 2021-04-14 RX ADMIN — METHYLPREDNISOLONE SODIUM SUCCINATE 40 MG: 40 INJECTION, POWDER, FOR SOLUTION INTRAMUSCULAR; INTRAVENOUS at 05:01

## 2021-04-14 RX ADMIN — ENOXAPARIN SODIUM 40 MG: 40 INJECTION SUBCUTANEOUS at 05:01

## 2021-04-14 RX ADMIN — FUROSEMIDE 20 MG: 10 INJECTION, SOLUTION INTRAMUSCULAR; INTRAVENOUS at 13:08

## 2021-04-14 RX ADMIN — Medication 10 ML: at 22:35

## 2021-04-14 RX ADMIN — METHYLPREDNISOLONE SODIUM SUCCINATE 40 MG: 40 INJECTION, POWDER, FOR SOLUTION INTRAMUSCULAR; INTRAVENOUS at 13:08

## 2021-04-14 RX ADMIN — TOCILIZUMAB 800 MG: 20 INJECTION, SOLUTION, CONCENTRATE INTRAVENOUS at 15:42

## 2021-04-14 RX ADMIN — METHYLPREDNISOLONE SODIUM SUCCINATE 40 MG: 40 INJECTION, POWDER, FOR SOLUTION INTRAMUSCULAR; INTRAVENOUS at 17:14

## 2021-04-14 RX ADMIN — ENOXAPARIN SODIUM 40 MG: 40 INJECTION SUBCUTANEOUS at 17:14

## 2021-04-14 RX ADMIN — THERA TABS 1 TABLET: TAB at 08:51

## 2021-04-14 RX ADMIN — Medication 2000 UNITS: at 08:51

## 2021-04-14 RX ADMIN — Medication 10 ML: at 05:06

## 2021-04-14 NOTE — ACP (ADVANCE CARE PLANNING)
Advance Care Planning     Advance Care Planning (ACP) Physician/NP/PA Conversation      Date of Conversation: 4/12/2021  Conducted with: Patient with Decision Making Capacity    Healthcare Decision Maker:     Primary Decision Maker: Eli Eric - Spouse - [phone]  Click here to complete [address] including selection of the Healthcare Decision Maker Relationship (ie \"Primary\")  Today we documented Decision Maker(s) consistent with Legal Next of Kin hierarchy. Care Preferences:    Hospitalization: \"If your health worsens and it becomes clear that your chance of recovery is unlikely, what would be your preference regarding hospitalization? \"  The patient would prefer hospitalization. Ventilation: \"If you were unable to breathe on your own and your chance of recovery was unlikely, what would be your preference about the use of a ventilator (breathing machine) if it was available to you? \"   The patient would desire the use of a ventilator. Resuscitation: \"In the event your heart stopped as a result of an underlying serious health condition, would you want attempts to be made to restart your heart, or would you prefer a natural death? \"   Yes, attempt to resuscitate.     Additional topics discussed: treatment goals, benefit/burden of treatment options, ventilation preferences and resuscitation preferences    Conversation Outcomes / Follow-Up Plan:   ACP complete - no further action today  Reviewed DNR/DNI and patient elects Full Code (Attempt Resuscitation)     Length of Voluntary ACP Conversation in minutes:  16 minutes    Janee Agustin MD

## 2021-04-14 NOTE — PROGRESS NOTES
Physician Progress Note      PATIENT:               Juan Pablo Haro  CSN #:                  [id number]  :                       1975  ADMIT DATE:       2021 11:55 AM  DISCH DATE:  RESPONDING  PROVIDER #:        Reg Kaiser MD          QUERY TEXT:    Dear Attending,    Pt admitted with COVID-19 and noted to have WBC of 3.9 and HR of  on admission. Pt also noted to have a temp of 102.1 and CRP of 12.30 on 21. If possible, please document in progress notes and discharge summary if you are evaluating and/or treating: The medical record reflects the following:  Risk Factors: COVID PNA  Clinical Indicators:  - WBC = 3.9 on  (admission date)  - HR =  on   - Temp (max) =102.1 on   - CRP = 12.30 on 41/3  - CXR = Mild bilateral perihilar interstitial infiltrates are concerning for atypical viral pneumonia. - CT abd/pelv = Findings consistent with COVID pneumonia throughout the bilateral lower lungs. Treatment: IV ABx (Zithromax & Rocephin), Solu-medrol, Decadron, guaifenesin-dextromethorphan, COVID testing, imaging studies, serial lab monitoring, vital signs monitoring    Thank-you,  Abi Fields RN, [facility]  Clinical   [phone]  Options provided:  -- Sepsis present on admission due to COVID-19 pneumonia  -- COVID-19 pneumonia without sepsis  -- Other - I will add my own diagnosis  -- Disagree - Not applicable / Not valid  -- Disagree - Clinically unable to determine / Unknown  -- Refer to Clinical Documentation Reviewer    PROVIDER RESPONSE TEXT:    Sepsis due to COVID-19 pneumonia, likely present on admission but fever manifestation developed subsequently. Query created by: Tejinder Mo on 2021 7:12 AM      QUERY TEXT:    Dear Attending,    Patient admitted with BMI 50.26 kg/m2. If possible, please document in progress notes and discharge summary if you are evaluating and /or treating any of the following:     The medical record reflects the following:  Risk Factors: DM2 w/ insulin pump  Clinical Indicators:  - BMI = 50.26 kg/m2  Treatment: Weight measurement, diabetic diet    Thank-you,  Kerwin Ambrocio RN, [facility]  Clinical   [phone]  Options provided:  -- Morbid obesity with BMI of 50.26 kg/m2  -- Other - I will add my own diagnosis  -- Disagree - Not applicable / Not valid  -- Disagree - Clinically unable to determine / Unknown  -- Refer to Clinical Documentation Reviewer    PROVIDER RESPONSE TEXT:    This patient has morbid obesity with a BMI of 50.26 kg/m2.     Query created by: Jessica Philippe on 4/14/2021 7:20 AM      Electronically signed by:  Riky Parekh MD 4/14/2021 8:37 AM

## 2021-04-14 NOTE — PROGRESS NOTES
[address] [facility] Adult  Hospitalist Group                                                                                          Hospitalist Progress Note  Jonathan Saini MD  Answering service: [phone] from in house phone        Date of Service:  2021  NAME:  Kaci Aguilar Sr.  :  1975  MRN:  [id number]      Admission Summary:   Kaci Aguilar Sr. is a 39 y.o. male with PMH of DM 2 on insulin pump, HTN, HLD, SHALOM on CPAP at home nightly, came to ED for evaluation of diarrhea going on for 5 days. Watery stools, 3-5 BMs per day, not able to keep anything inside, without any associated nausea/ vomiting/ fever/ chills/ abdominal pain. Patient denied any sore throat, cough, congestion, dizziness, lightheadedness, loss of appetite, muscle ache, body ache, fatigue, tiredness, constipation or urinary trouble. In the ED, patient was evaluated with COVID-19 rapid testing which turned positive. Hospitalist team asked to admit the patient for further evaluation. Interval history / Subjective: Follow up COVID-19 pneumonia, diarrhea. Patient seen and examined at the bedside. Labs, images and notes reviewed  Discussed with nursing staff, orders reviewed. Plan discussed with patient/Family  Not feeling much better. Started needing oxygen supplementation. Without O2 supplementation, sats dropping mid to low 80s. Assessment & Plan:     #Diarrhea with acute viral syndrome, COVID-19 +. Diarrhea better  #Bilateral pneumonia due to COVID-19 virus. Cough with deep breathing. High-grade fever noted first time on . Worsening  #Acute hypoxic respiratory failure due to above, currently on 2 days 3 LPM O2 NC  #Hyponatremia, related to dehydration. 126 on admission . Improving  #Mild MARYCARMEN. Baseline creatinine around 1.1, and 1.41 on admission on . Improved to 1.16 on  with gentle IVF 1L.   #Abnormal UA with hematuria, proteinuria and glycosuria  #DM2 with hyperglycemia, on insulin pump with Humalog. Significant insulin resistant. Poorly controlled due to steroids, with likelihood of further worsening  #Mildly up trended transaminases, likely viral syndrome related  #HTN  #HLD  #SHALOM on CPAP at night  #LAFB on EKG     Plan:      -Admitted to inpatient, telemetry  -IVF NS 1 L given on admission with caution. -CXR 4/14 done. Stable bilateral lower lobe infiltrates  -Start IV Actemra after D/W pulmonology  -Not a candidate for IV remdesivir due to elevated LFTs, borderline time duration of symptom onset, almost close to 7-day  -Monitor CBC, CMP and inflammatory markers as noted below  -IV Lasix 20 mg once with caution, close BMP monitoring  -O2 supplementation as needed to keep sats >90%  -Droplet plus isolation precautions  -Inflammatory markers, procalcitonin, D-dimer, CRP, ferritin, LDH  -IV Decadron 6 mg daily for 10 days. Pulmonology changed to high-dose IV Solu-Medrol  -Monitor Accu-Cheks closely with aggressive measures to control hyperglycemia better with additional insulin as appropriate while on steroids  -O2 supplementation as needed  -IV Rocephin + azithromycin.  -Procalcitonin 0.1 on 4/13  -Pulmonology consultation. Appreciate recommendations  -Insulin pump management per diabetes education team  -DM education team on board. Managing insulin pump. Battery and supplies received from home today 4/13  -CPAP at night per RT  -Stool enteric pathogen eval.     Emergency contact: Spouse     Mild deterioration of clinical presentation with hypoxia. Monitor closely. High risk for rapid decompensation.       Code status: Full code  DVT prophylaxis: Lovenox SQ per COVID-19 protocol    Care Plan discussed with: Patient/Family and Nurse  Anticipated Disposition: Home w/Family  Anticipated Discharge: Greater than 48 hours     Hospital Problems  Date Reviewed: 11/19/2020          Codes Class Noted POA    Hyponatremia ICD-10-CM: E87.1  ICD-9-CM: 276.1  4/12/2021 Unknown        Acute respiratory failure with hypoxemia [facility]) ICD-10-CM: J96.01  ICD-9-CM: 518.81  4/12/2021 Unknown        Pneumonia due to COVID-19 virus ICD-10-CM: U07.1, J12.82  ICD-9-CM: 480.8, 079.89  4/12/2021 Unknown                Review of Systems:   A comprehensive review of systems was negative except for that written in the HPI. Vital Signs:    Last 24hrs VS reviewed since prior progress note. Most recent are:  Visit Vitals  /88   Pulse 89   Temp 99 °F (37.2 °C)   Resp 28   Ht 5' 10\" (1.778 m)   Wt (!) 158.9 kg (350 lb 5 oz)   SpO2 (!) 88%   BMI 50.26 kg/m²         Intake/Output Summary (Last 24 hours) at 4/14/2021 0859  Last data filed at 4/14/2021 0056  Gross per 24 hour   Intake 400 ml   Output --   Net 400 ml        Physical Examination:     I had a face to face encounter with this patient and independently examined them on 4/14/2021 as outlined below:          General:          Alert, cooperative, no distress, appears stated age. Morbid obesity. On 2 LPM O2 NC , uptitrate to 3 LPM given sats 89-90% on 2 LPM.  Subsequently improved to 91-92%  HEENT:           Atraumatic, anicteric sclerae, pink conjunctivae                          No oral ulcers, mucosa moist, throat clear, dentition fair  Neck:               Supple, symmetrical  Lungs:              Clear to auscultation bilaterally, reduced AE. No wheezing/rhonchi/rales  Chest wall:      No tenderness  No Accessory muscle use. Heart:              Regular  rhythm,  No  murmur   No edema  Abdomen:        Soft, non-tender. Not distended. Bowel sounds normal  Extremities:     No cyanosis. No clubbing,                            Skin turgor normal, Capillary refill normal  Skin:                Not pale. Not Jaundiced  No rashes   Psych:             Not anxious or agitated.   Neurologic:      Alert, moves all extremities, answers questions appropriately and responds to commands          Data Review:    Review and/or order of clinical lab test  Review and/or order of tests in the radiology section of CPT  Review and/or order of tests in the medicine section of CPT    Xr Chest Pa Lat    Result Date: 4/12/2021  Mild bilateral perihilar interstitial infiltrates are concerning for atypical viral pneumonia. Ct Abd Pelv Wo Cont    Result Date: 4/12/2021  1. No evidence of acute process in the abdomen or pelvis. No urolithiasis. 2. Hepatic steatosis. 3. Findings consistent with COVID pneumonia throughout the bilateral lower lungs. Labs:     Recent Labs     04/13/21  0715 04/12/21  1102   WBC 4.4 3.9*   HGB 12.5 12.7   HCT 36.6 36.7   * 136*     Recent Labs     04/13/21  0715 04/12/21  1102   * 126*   K 3.6 3.6   CL 98 93*   CO2 21 23   BUN 28* 23*   CREA 1.16 1.41*   * 397*   CA 8.8 8.3*   MG  --  1.8   PHOS  --  2.8     Recent Labs     04/13/21  0715 04/12/21  1102   * 117*   AP 40* 44*   TBILI 0.8 1.1*   TP 8.1 8.2   ALB 3.2* 3.4*   GLOB 4.9* 4.8*     Recent Labs     04/13/21  0715   INR 1.1   PTP 11.5*   APTT 29.3      Recent Labs     04/14/21  0501 04/13/21  1708   FERR 1,265* 1,304*      No results found for: FOL, RBCF   No results for input(s): PH, PCO2, PO2 in the last 72 hours.   Recent Labs     04/13/21  0715 04/13/21  0405 04/12/21  1739   TROIQ <0.05 <0.05 <0.05     Lab Results   Component Value Date/Time    Cholesterol, total 76 10/15/2020 09:26 AM    HDL Cholesterol 30 10/15/2020 09:26 AM    LDL, calculated 23 10/15/2020 09:26 AM    Triglyceride 115 10/15/2020 09:26 AM    CHOL/HDL Ratio 2.5 10/15/2020 09:26 AM     Lab Results   Component Value Date/Time    Glucose (POC) 334 (H) 04/14/2021 08:10 AM    Glucose (POC) 412 (H) 04/13/2021 10:04 PM    Glucose (POC) 363 (H) 04/13/2021 02:18 PM    Glucose (POC) 336 (H) 04/13/2021 10:55 AM    Glucose (POC) 187 (H) 04/13/2021 08:09 AM     Lab Results   Component Value Date/Time    Color YELLOW/STRAW 04/12/2021 11:16 AM    Appearance CLEAR 04/12/2021 11:16 AM Specific gravity 1.029 04/12/2021 11:16 AM    pH (UA) 5.5 04/12/2021 11:16 AM    Protein 100 (A) 04/12/2021 11:16 AM    Glucose >1,000 (A) 04/12/2021 11:16 AM    Ketone 80 (A) 04/12/2021 11:16 AM    Bilirubin Negative 04/12/2021 11:16 AM    Urobilinogen 0.2 04/12/2021 11:16 AM    Nitrites Negative 04/12/2021 11:16 AM    Leukocyte Esterase Negative 04/12/2021 11:16 AM    Epithelial cells FEW 04/12/2021 11:16 AM    Bacteria 4+ (A) 04/12/2021 11:16 AM    WBC 5-10 04/12/2021 11:16 AM    RBC 0-5 04/12/2021 11:16 AM         Medications Reviewed:     Current Facility-Administered Medications   Medication Dose Route Frequency    methylPREDNISolone (PF) (SOLU-MEDROL) injection 40 mg  40 mg IntraVENous Q8H    insulin pump (PATIENT SUPPLIED)   SubCUTAneous PRN    sodium chloride (NS) flush 5-10 mL  5-10 mL IntraVENous PRN    sodium chloride (NS) flush 5-40 mL  5-40 mL IntraVENous Q8H    sodium chloride (NS) flush 5-40 mL  5-40 mL IntraVENous PRN    acetaminophen (TYLENOL) tablet 650 mg  650 mg Oral Q6H PRN    Or    acetaminophen (TYLENOL) suppository 650 mg  650 mg Rectal Q6H PRN    polyethylene glycol (MIRALAX) packet 17 g  17 g Oral DAILY PRN    promethazine (PHENERGAN) tablet 12.5 mg  12.5 mg Oral Q6H PRN    Or    ondansetron (ZOFRAN) injection 4 mg  4 mg IntraVENous Q6H PRN    cholecalciferol (VITAMIN D3) (1000 Units /25 mcg) tablet 2,000 Units  2,000 Units Oral DAILY    guaiFENesin-dextromethorphan (ROBITUSSIN DM) 100-10 mg/5 mL syrup 5 mL  5 mL Oral Q4H PRN    benzonatate (TESSALON) capsule 200 mg  200 mg Oral TID PRN    therapeutic multivitamin (THERAGRAN) tablet 1 Tab  1 Tab Oral DAILY    enoxaparin (LOVENOX) injection 40 mg  40 mg SubCUTAneous Q12H    glucose chewable tablet 16 g  4 Tab Oral PRN    dextrose (D50W) injection syrg 12.5-25 g  25-50 mL IntraVENous PRN    glucagon (GLUCAGEN) injection 1 mg  1 mg IntraMUSCular PRN ______________________________________________________________________  EXPECTED LENGTH OF STAY: 5d 9h  ACTUAL LENGTH OF STAY:          2                 Cristi Gibbs MD

## 2021-04-14 NOTE — DIABETES MGMT
[facility]    CLINICAL NURSE SPECIALIST CONSULT   FOLLOW UP NOTE    Initial Presentation   Alessio Higuera is a 39 y.o. male who presents to the ED 4/12/21 with a 4 day complaint of diarrhea with abdominal pain. He had a rapid COVID-19 test which was positive    HX:   Past Medical History:   Diagnosis Date    DM (diabetes mellitus) ([facility] [address] Age 32    HLD (hyperlipidaemia)     HTN         DX: Gastritis, COVID-19 pneumonia    TX: IV steroids/IV antibiotics     Hospital course   Clinical progress has been uncomplicated. Diabetes    Patient has known Type 2 diabetes, treated with Metformin and Humalog PTA. Family history positive for diabetes: Mother with Type 2 Diabetes     Admission  and A1c 9.3% indicate poor diabetes control. Ambulatory blood glucose management provided by endocrinologist: Ghislaine Beltran MD with [facility].     Consulted by Luda Bejarano MD for advanced diabetes nursing assessment and care, specifically related to    [x] Inpatient management strategy      Diabetes-related medical history  Acute complications: Hyperglycemia  Neurological complications  Peripheral neuropathy  Microvascular disease: None  Macrovascular disease: None      Diabetes medication history  Drug class Currently in use Discontinued Never used   Biguanide Metformin 500mg at breakfast and dinner     DDP-4 inhibitor       Sulfonylurea      Thiazolidinedione      GLP-1 RA      SGLT-2 inhibitors      Basal insulin      Bolus insulin      Fixed Dose  Combinations Insulin pump via settings below       Pump settings:  - basal: 12a: 6.35 units/hr   - Carb ratio: 1:3  - sensitivity: 10  - target: 100-120  - active insulin time: 3 hr  Subjective   Type 2 Diabetes    GFR 60  A1C 9.4%  Blood cultures with NGTD  Switched to methylpred 40mg Q6 (from 40mg Q8)  24 hr glucose pattern: 334-412   Total daily insulin dose the last 24 hs: 304 units  Basal rate: 6.35 units/hr    Date/Time Insulin Requirements Notes   4/13/21 220pm  , delivered 24.9 units Pump restarted   330pm Ate lunch: 25 units 8 grams CHO (1:3 coverage)   6pm 15.6 units (no correlating BG seen)    10pm : 25 units     4/14/21 8a : 23.4 units  Breakfast: 13.6 units    11a , 25.6 units                     Objective   Physical exam  General Alert, oriented and in no acute distress/ill-appearing. Conversant and cooperative. Vital Signs   Visit Vitals  /76 (BP 1 Location: Right upper arm, BP Patient Position: Sitting)   Pulse 95   Temp 99.3 °F (37.4 °C)   Resp 25   Ht 5' 10\" (1.778 m)   Wt (!) 158.9 kg (350 lb 5 oz)   SpO2 91%   BMI 50.26 kg/m²     Skin  Warm and dry. Acanthosis noted along neckline. No lipohypertrophy or lipoatrophy noted at injection sites   Heart   Regular rate and rhythm. No murmurs, rubs or gallops  Lungs  Clear to auscultation without rales or rhonchi  Extremities No foot wounds      Laboratory  No results found for this visit on 04/12/21 (from the past 12 hour(s)). No results found for this visit on 04/12/21 (from the past 12 hour(s)). All inpatient labs reviewed in full    Factors impacting BG management  Factor Dose Comments   Nutrition:  Carb-controlled meals     60 grams/meal      Drugs:  Steroids   Methylprednisolone 40mg Q6 Methylprednisolone Dosing  8mg of Methylprednisolone dose is equivalent to 0. 1units/kg NPH    16 of Methylprednisolone  dose is equivalent to    0.2units/kg NPH  24 of Methylprednisolone  dose is equivalent to 0.3units/kg NPH    36+ of Methylprednisolone  dose is equivalent to 0.4units/kg NPH   Infection/COVID-19  Hyperglycemia in the setting of COVID-19 infection is associated with a blunted immune response and delayed recovery. The subcutaneous insulin order set can be initiated to optimize his blood glucose control in the impatient setting.       Blood glucose pattern        Assessment and Plan   Nursing Diagnosis Risk for unstable blood glucose pattern   Nursing Intervention Domain 5257 Decision-making Support   Nursing Interventions Examined current inpatient diabetes control   Explored factors facilitating and impeding inpatient management  Identified self-management practices impeding diabetes control  Explored corrective strategies with patient and responsible inpatient provider   Informed patient of rational for insulin strategy while hospitalized     Evaluation   This  gentleman, with Type 2 diabetes, on very high dose insulin via Medtronic MiniMed insulin pump. did not achieve diabetes control prior to admission, as evidenced by admission BG of 397 and A1c of 9.3%. During this hospitalization, his insulin pump has been resumed and the patient has not achieved inpatient blood glucose target of 100-180mg/dl despite requiring up to 300 units yesterday. Several factors have played a role in blood glucose management including:  [x] Critical nature of illness state  [x] Glucocorticoid use    The Subcutaneous Insulin Order set (5859) has not been in use but insulin pump initiated just now. Hence, the next step in optimizing blood glucose control would be    [x]  Optimize insulin pump settings     Patient at very high risk for steroid induced hyperglycemia at current doses. Please keep watchful eye on glucose pattern and may need additional SQ insulin on top of pump settings. Recommendations   1. Patient awake and able to independently utilize insulin pump at PTA insulin settings at this time  2. Start 60 units NPH with every 40 mg Solu-medrol dose  3. POC glucose ACHS. 4. Patient to bolus off the pump for all carbohydrate intake  5. Patient to enter blood glucose into pump to deliver additional correctional insulin ACHS. Can also consider correctional dose coverage at 2am/overnight  6. RN to document all insulin doses given to patient off insulin pump in EMR (under LDA flow sheet)  7.  Please place PRN Humalog vial for patient to fill reservoir PRN. If insulin pump needs to be removed (with current steroid order- solumedrol 40mg Q6) back-up settings are:  Basal: 90 units NPH Q6  Bolus: 1 unit Humalog for every 3 grams Carbs  Correctional: 1 unit Humalog for every 10 points over 200  Billing Code(s)   [x] 15860       Before making these care recommendations, I personally reviewed the hosptialization record, including laboratory and diagnostic data, medications and examined the patient at bedside (circumstances permitting).   Total minutes: 28    JOSÉ MIGUEL Hilton  Diabetes Clinical Nurse Specialist  [facility]  Access via [employer]

## 2021-04-14 NOTE — PROGRESS NOTES
2204: Pts BG was 412. Pt administered 33u of insulin using personal insulin pump. Hospital glucometer was used to obtain BG.

## 2021-04-14 NOTE — PROGRESS NOTES
Pulmonary, Critical Care, and Sleep Medicine~Progress Note    Name: Dulce Ansari. MRN: [id number]   : 1975 Hospital: [facility]    Date: 2021 11:33 AM Admission: 2021     Impression Plan   1. COVID 19 + PNA, B +. Vit d deficient   2. Diaerrha, suspect due to above   3. Elevated liver enzymes   4. SHALOM, on home CPAP  5. DM II  6. HLD 1. Monitoring inflammatory markers   2. D dimer, probnp  3. Cpap at night   4. O2 titration above 90%   5. CRP elevated still, increase solu medrol. Low threshold for actemra  6. Discussed with pharm, still in the window for remdesivir and he is now a candidate. If on todays CMP liver enzymes are not grossly elevated we can start remdesivir   7. lovenox bid dosing      Daily Progression:      Now requiring O2  Feels ok; wore NIV overnight  D dimer 0.42  CRP 9.93       Consult Note requested by hospitalist     Patient presented with increased abdominal pains and diarrhea for the past 5 days. No overt pulmonary complaints were noted. Currently on room air. No pervious pulmonary complaints noted. Still spiking fevers, but feeling better. I have reviewed the labs and previous days notes. Pertinent items are noted in HPI. Past Medical History:   Diagnosis Date    DM (diabetes mellitus) ([facility] [address] Age 29    HLD (hyperlipidaemia)     HTN       Past Surgical History:   Procedure Laterality Date    HX ORTHOPAEDIC Left 1998    pins in toes of foot    HX REFRACTIVE SURGERY        Prior to Admission medications    Medication Sig Start Date End Date Taking? Authorizing Provider   insulin pump (PATIENT SUPPLIED) [facility] by SubCUTAneous route continuous.    Yes Provider, Historical   metFORMIN ER (GLUCOPHAGE XR) 500 mg tablet TAKE 2 TABLETS WITH BREAKFAST AND DINNER 3/31/21  Yes Riley Hudson MD   atorvastatin (LIPITOR) 10 mg tablet TAKE 1 TABLET DAILY 3/3/21  Yes Patrica Melendez MD   losartan (COZAAR) 100 mg tablet TAKE 1 TABLET DAILY 20  Yes Alexsandra Coats MD   chlorthalidone (HYGROTEN) 25 mg tablet TAKE 1 TABLET DAILY 20  Yes Alexsandra Coats MD   multivitamin (DAILY MULTI-VITAMIN) tablet Take 1 Tab by mouth daily. Yes Provider, Historical   aspirin 81 mg tablet Take 81 mg by mouth daily. Yes Provider, Historical     Allergies   Allergen Reactions    Lisinopril Cough      Social History     Tobacco Use    Smoking status: Never Smoker    Smokeless tobacco: Never Used   Substance Use Topics    Alcohol use: Yes     Alcohol/week: 0.0 standard drinks     Comment: glass of wine 1-2 times a month      Family History   Problem Relation Age of Onset    Diabetes Mother     Heart Attack Father 61    Diabetes Maternal Grandmother     Diabetes Maternal Grandfather     Diabetes Paternal Grandmother     Diabetes Paternal Grandfather      OBJECTIVE:     Vital Signs:       Visit Vitals  /88   Pulse 89   Temp 99 °F (37.2 °C)   Resp 28   Ht 5' 10\" (1.778 m)   Wt (!) 158.9 kg (350 lb 5 oz)   SpO2 (!) 88%   BMI 50.26 kg/m²      Temp (24hrs), Av.8 °F (37.7 °C), Min:98.8 °F (37.1 °C), Max:102.1 °F (38.9 °C)     Intake/Output:     Last shift: No intake/output data recorded.     Last 3 shifts:  1901 -  0700  In: 1600 [P.O.:1600]  Out: -           Intake/Output Summary (Last 24 hours) at 2021 8910  Last data filed at 2021 0056  Gross per 24 hour   Intake 400 ml   Output --   Net 400 ml       Physical Exam:                                        Exam Findings Other   General: No resp distress noted, appears stated age    [de-identified]:  No ulcers, JVD not elevated, no cervical LAD    Chest: No pectus deformity, normal chest rise b/l    HEART:  No visible thrills    Lungs:  Normal expansion     ABD: Soft/NT, non rigid mildly distended    EXT: No cyanosis/clubbing/edema, normal peripheral pulses    Skin: No rashes or ulcers, no mottling    Neuro: A/O x 3        Medications:  Current Facility-Administered Medications   Medication Dose Route Frequency    methylPREDNISolone (PF) (SOLU-MEDROL) injection 40 mg  40 mg IntraVENous Q8H    insulin pump (PATIENT SUPPLIED)   SubCUTAneous PRN    sodium chloride (NS) flush 5-10 mL  5-10 mL IntraVENous PRN    sodium chloride (NS) flush 5-40 mL  5-40 mL IntraVENous Q8H    sodium chloride (NS) flush 5-40 mL  5-40 mL IntraVENous PRN    acetaminophen (TYLENOL) tablet 650 mg  650 mg Oral Q6H PRN    Or    acetaminophen (TYLENOL) suppository 650 mg  650 mg Rectal Q6H PRN    polyethylene glycol (MIRALAX) packet 17 g  17 g Oral DAILY PRN    promethazine (PHENERGAN) tablet 12.5 mg  12.5 mg Oral Q6H PRN    Or    ondansetron (ZOFRAN) injection 4 mg  4 mg IntraVENous Q6H PRN    cholecalciferol (VITAMIN D3) (1000 Units /25 mcg) tablet 2,000 Units  2,000 Units Oral DAILY    guaiFENesin-dextromethorphan (ROBITUSSIN DM) 100-10 mg/5 mL syrup 5 mL  5 mL Oral Q4H PRN    benzonatate (TESSALON) capsule 200 mg  200 mg Oral TID PRN    therapeutic multivitamin (THERAGRAN) tablet 1 Tab  1 Tab Oral DAILY    enoxaparin (LOVENOX) injection 40 mg  40 mg SubCUTAneous Q12H    glucose chewable tablet 16 g  4 Tab Oral PRN    dextrose (D50W) injection syrg 12.5-25 g  25-50 mL IntraVENous PRN    glucagon (GLUCAGEN) injection 1 mg  1 mg IntraMUSCular PRN       Labs:  ABG Recent Labs     04/12/21  1131   PHI 7.46*   PCO2I 30.3*   PO2I 53*   HCO3I 21.4*   SO2I 89*        CBC Recent Labs     04/13/21  0715 04/12/21  1102   WBC 4.4 3.9*   HGB 12.5 12.7   HCT 36.6 36.7   * 136*   MCV 80.8 80.5   MCH 27.6 73.6        Metabolic  Panel Recent Labs     04/13/21  0715 04/12/21  1102   * 126*   K 3.6 3.6   CL 98 93*   CO2 21 23   * 397*   BUN 28* 23*   CREA 1.16 1.41*   CA 8.8 8.3*   MG  --  1.8   PHOS  --  2.8   ALB 3.2* 3.4*   * 117*   INR 1.1  --         Pertinent Labs                Jordan Ca PA-C  4/14/2021

## 2021-04-14 NOTE — PROGRESS NOTES
Problem: Diabetes Self-Management  Goal: *Disease process and treatment process  Description: Define diabetes and identify own type of diabetes; list 3 options for treating diabetes. Outcome: Progressing Towards Goal  Goal: *Incorporating nutritional management into lifestyle  Description: Describe effect of type, amount and timing of food on blood glucose; list 3 methods for planning meals. Outcome: Progressing Towards Goal  Goal: *Using medications safely  Description: State effect of diabetes medications on diabetes; name diabetes medication taking, action and side effects. Outcome: Progressing Towards Goal  Goal: *Monitoring blood glucose, interpreting and using results  Description: Identify recommended blood glucose targets  and personal targets. Outcome: Progressing Towards Goal     Problem: Falls - Risk of  Goal: *Absence of Falls  Description: Document Jared Del Cid Fall Risk and appropriate interventions in the flowsheet.   Outcome: Progressing Towards Goal  Note: Fall Risk Interventions:            Medication Interventions: Evaluate medications/consider consulting pharmacy, Patient to call before getting OOB, Teach patient to arise slowly

## 2021-04-14 NOTE — PROGRESS NOTES
[address] [facility] Adult  Hospitalist Group                                                                                          Hospitalist Progress Note  Stan Ley MD  Answering service: [phone] OR 36 from in house phone        Date of Service:  2021  NAME:  Job Mcfadden Sr.  :  1975  MRN:  [id number]      Admission Summary:   Job Mcfadden Sr. is a 39 y.o. male with PMH of DM 2 on insulin pump, HTN, HLD, SHALOM on CPAP at home nightly, came to ED for evaluation of diarrhea going on for 5 days. Watery stools, 3-5 BMs per day, not able to keep anything inside, without any associated nausea/ vomiting/ fever/ chills/ abdominal pain. Patient denied any sore throat, cough, congestion, dizziness, lightheadedness, loss of appetite, muscle ache, body ache, fatigue, tiredness, constipation or urinary trouble. In the ED, patient was evaluated with COVID-19 rapid testing which turned positive. Hospitalist team asked to admit the patient for further evaluation. Interval history / Subjective: Follow up COVID-19 pneumonia, diarrhea. Patient seen and examined at the bedside. Labs, images and notes reviewed  Discussed with nursing staff, orders reviewed. Plan discussed with patient/Family  Feeling okay. No chest pain or shortness of breath. Overnight fevers noted. During the later part of the day, high-grade fever for the first time. Saturating well in mid 90s. No nausea, vomiting, diarrhea. No other concerns or questions. Assessment & Plan:     #Diarrhea with acute viral syndrome, COVID-19 +. Better  #Bilateral pneumonia due to COVID-19 virus. Cough with deep breathing. High-grade fever noted first time on   #Hyponatremia, related to dehydration. 126 on admission . Improving  #Mild MARYCARMEN. Baseline creatinine around 1.1, and 1.41 on admission on . Improved to 1.16 on  with gentle IVF 1L.   #Abnormal UA with hematuria, proteinuria and glycosuria  #DM2 with hyperglycemia, on insulin pump with Humalog. Significant insulin resistant. Poorly controlled due to steroids, with likelihood of further worsening  #HTN  #HLD  #SHALOM on CPAP at night  #LAFB on EKG     Plan:      -Admitted to inpatient, telemetry  -IVF NS 1 L at 75 mL/h with caution given COVID-19. Improved hydration and electrolytes  -Droplet plus isolation precautions  -Inflammatory markers, procalcitonin, D-dimer, CRP, ferritin, LDH  -IV Decadron 6 mg daily for 10 days. Pulmonology changed to high-dose IV Solu-Medrol  -Monitor Accu-Cheks closely with aggressive measures to control hyperglycemia better with additional insulin as appropriate while on steroids  -O2 supplementation as needed  -IV Rocephin + azithromycin.  -Procalcitonin 0.1 on 4/13  -Pulmonology consultation. Appreciate recommendations  -Insulin pump management per diabetes education team  -DM education team on board. Managing insulin pump. Battery and supplies received from home today 4/13  -CPAP at night per RT  -Stool enteric pathogen eval.     Emergency contact: Spouse       Code status: Full code  DVT prophylaxis: Lovenox SQ per COVID-19 protocol    Care Plan discussed with: Patient/Family and Nurse  Anticipated Disposition: Home w/Family  Anticipated Discharge: Greater than 48 hours     Hospital Problems  Date Reviewed: 11/19/2020          Codes Class Noted POA    Hyponatremia ICD-10-CM: E87.1  ICD-9-CM: 276.1  4/12/2021 Unknown        Acute respiratory failure with hypoxemia ([facility] [address] ICD-10-CM: J96.01  ICD-9-CM: 518.81  4/12/2021 Unknown        Pneumonia due to COVID-19 virus ICD-10-CM: U07.1, J12.82  ICD-9-CM: 480.8, 079.89  4/12/2021 Unknown                Review of Systems:   A comprehensive review of systems was negative except for that written in the HPI. Vital Signs:    Last 24hrs VS reviewed since prior progress note.  Most recent are:  Visit Vitals  BP (!) 142/70 (BP 1 Location: Right upper arm, BP Patient Position: Sitting)   Pulse (!) 107   Temp 100 °F (37.8 °C)   Resp 21   Ht 5' 10\" (1.778 m)   Wt (!) 160.4 kg (353 lb 9.9 oz)   SpO2 94%   BMI 50.74 kg/m²         Intake/Output Summary (Last 24 hours) at 4/13/2021 2226  Last data filed at 4/13/2021 0405  Gross per 24 hour   Intake 600 ml   Output --   Net 600 ml        Physical Examination:     I had a face to face encounter with this patient and independently examined them on 4/13/2021 as outlined below:          General:          Alert, cooperative, no distress, appears stated age. Morbid obesity. HEENT:           Atraumatic, anicteric sclerae, pink conjunctivae                          No oral ulcers, mucosa moist, throat clear, dentition fair  Neck:               Supple, symmetrical  Lungs:             Clear to auscultation bilaterally. No Wheezing or Rhonchi. No rales. Chest wall:      No tenderness  No Accessory muscle use. Heart:              Regular  rhythm,  No  murmur   No edema  Abdomen:        Soft, non-tender. Not distended. Bowel sounds normal  Extremities:     No cyanosis. No clubbing,                            Skin turgor normal, Capillary refill normal  Skin:                Not pale. Not Jaundiced  No rashes   Psych:             Not anxious or agitated. Neurologic:      Alert, moves all extremities, answers questions appropriately and responds to commands          Data Review:    Review and/or order of clinical lab test  Review and/or order of tests in the radiology section of CPT  Review and/or order of tests in the medicine section of CPT    Xr Chest Pa Lat    Result Date: 4/12/2021  Mild bilateral perihilar interstitial infiltrates are concerning for atypical viral pneumonia. Ct Abd Pelv Wo Cont    Result Date: 4/12/2021  1. No evidence of acute process in the abdomen or pelvis. No urolithiasis. 2. Hepatic steatosis. 3. Findings consistent with COVID pneumonia throughout the bilateral lower lungs.        Labs:     Recent Labs 04/13/21  0715 04/12/21  1102   WBC 4.4 3.9*   HGB 12.5 12.7   HCT 36.6 36.7   * 136*     Recent Labs     04/13/21  0715 04/12/21  1102   * 126*   K 3.6 3.6   CL 98 93*   CO2 21 23   BUN 28* 23*   CREA 1.16 1.41*   * 397*   CA 8.8 8.3*   MG  --  1.8   PHOS  --  2.8     Recent Labs     04/13/21  0715 04/12/21  1102   * 117*   AP 40* 44*   TBILI 0.8 1.1*   TP 8.1 8.2   ALB 3.2* 3.4*   GLOB 4.9* 4.8*     Recent Labs     04/13/21  0715   INR 1.1   PTP 11.5*   APTT 29.3      Recent Labs     04/13/21  1708 04/12/21  1102   FERR 1,304* 1,058*      No results found for: FOL, RBCF   No results for input(s): PH, PCO2, PO2 in the last 72 hours.   Recent Labs     04/13/21  0715 04/13/21  0405 04/12/21  1739   TROIQ <0.05 <0.05 <0.05     Lab Results   Component Value Date/Time    Cholesterol, total 76 10/15/2020 09:26 AM    HDL Cholesterol 30 10/15/2020 09:26 AM    LDL, calculated 23 10/15/2020 09:26 AM    Triglyceride 115 10/15/2020 09:26 AM    CHOL/HDL Ratio 2.5 10/15/2020 09:26 AM     Lab Results   Component Value Date/Time    Glucose (POC) 412 (H) 04/13/2021 10:04 PM    Glucose (POC) 363 (H) 04/13/2021 02:18 PM    Glucose (POC) 336 (H) 04/13/2021 10:55 AM    Glucose (POC) 187 (H) 04/13/2021 08:09 AM    Glucose (POC) 147 (H) 04/13/2021 03:58 AM     Lab Results   Component Value Date/Time    Color YELLOW/STRAW 04/12/2021 11:16 AM    Appearance CLEAR 04/12/2021 11:16 AM    Specific gravity 1.029 04/12/2021 11:16 AM    pH (UA) 5.5 04/12/2021 11:16 AM    Protein 100 (A) 04/12/2021 11:16 AM    Glucose >1,000 (A) 04/12/2021 11:16 AM    Ketone 80 (A) 04/12/2021 11:16 AM    Bilirubin Negative 04/12/2021 11:16 AM    Urobilinogen 0.2 04/12/2021 11:16 AM    Nitrites Negative 04/12/2021 11:16 AM    Leukocyte Esterase Negative 04/12/2021 11:16 AM    Epithelial cells FEW 04/12/2021 11:16 AM    Bacteria 4+ (A) 04/12/2021 11:16 AM    WBC 5-10 04/12/2021 11:16 AM    RBC 0-5 04/12/2021 11:16 AM         Medications Reviewed:     Current Facility-Administered Medications   Medication Dose Route Frequency    methylPREDNISolone (PF) (SOLU-MEDROL) injection 40 mg  40 mg IntraVENous Q8H    insulin pump (PATIENT SUPPLIED)   SubCUTAneous PRN    sodium chloride (NS) flush 5-10 mL  5-10 mL IntraVENous PRN    sodium chloride (NS) flush 5-40 mL  5-40 mL IntraVENous Q8H    sodium chloride (NS) flush 5-40 mL  5-40 mL IntraVENous PRN    acetaminophen (TYLENOL) tablet 650 mg  650 mg Oral Q6H PRN    Or    acetaminophen (TYLENOL) suppository 650 mg  650 mg Rectal Q6H PRN    polyethylene glycol (MIRALAX) packet 17 g  17 g Oral DAILY PRN    promethazine (PHENERGAN) tablet 12.5 mg  12.5 mg Oral Q6H PRN    Or    ondansetron (ZOFRAN) injection 4 mg  4 mg IntraVENous Q6H PRN    cholecalciferol (VITAMIN D3) (1000 Units /25 mcg) tablet 2,000 Units  2,000 Units Oral DAILY    guaiFENesin-dextromethorphan (ROBITUSSIN DM) 100-10 mg/5 mL syrup 5 mL  5 mL Oral Q4H PRN    benzonatate (TESSALON) capsule 200 mg  200 mg Oral TID PRN    therapeutic multivitamin (THERAGRAN) tablet 1 Tab  1 Tab Oral DAILY    enoxaparin (LOVENOX) injection 40 mg  40 mg SubCUTAneous Q12H    glucose chewable tablet 16 g  4 Tab Oral PRN    dextrose (D50W) injection syrg 12.5-25 g  25-50 mL IntraVENous PRN    glucagon (GLUCAGEN) injection 1 mg  1 mg IntraMUSCular PRN     ______________________________________________________________________  EXPECTED LENGTH OF STAY: 5d 9h  ACTUAL LENGTH OF STAY:          1                 Boni Vaca MD

## 2021-04-15 LAB
ALBUMIN SERPL-MCNC: 2.9 G/DL (ref 3.5–5)
ALBUMIN/GLOB SERPL: 0.6 {RATIO} (ref 1.1–2.2)
ALP SERPL-CCNC: 40 U/L (ref 45–117)
ALT SERPL-CCNC: 176 U/L (ref 12–78)
ANION GAP SERPL CALC-SCNC: 7 MMOL/L (ref 5–15)
AST SERPL-CCNC: 85 U/L (ref 15–37)
BASOPHILS # BLD: 0 K/UL (ref 0–0.1)
BASOPHILS NFR BLD: 0 % (ref 0–1)
BILIRUB SERPL-MCNC: 0.5 MG/DL (ref 0.2–1)
BNP SERPL-MCNC: 64 PG/ML
BUN SERPL-MCNC: 25 MG/DL (ref 6–20)
BUN/CREAT SERPL: 29 (ref 12–20)
CALCIUM SERPL-MCNC: 8.9 MG/DL (ref 8.5–10.1)
CHLORIDE SERPL-SCNC: 103 MMOL/L (ref 97–108)
CO2 SERPL-SCNC: 25 MMOL/L (ref 21–32)
CREAT SERPL-MCNC: 0.87 MG/DL (ref 0.7–1.3)
CRP SERPL-MCNC: 6.8 MG/DL (ref 0–0.6)
D DIMER PPP FEU-MCNC: 0.51 MG/L FEU (ref 0–0.65)
DIFFERENTIAL METHOD BLD: ABNORMAL
EOSINOPHIL # BLD: 0 K/UL (ref 0–0.4)
EOSINOPHIL NFR BLD: 0 % (ref 0–7)
ERYTHROCYTE [DISTWIDTH] IN BLOOD BY AUTOMATED COUNT: 12.1 % (ref 11.5–14.5)
GLOBULIN SER CALC-MCNC: 4.9 G/DL (ref 2–4)
GLUCOSE BLD STRIP.AUTO-MCNC: 106 MG/DL (ref 65–100)
GLUCOSE BLD STRIP.AUTO-MCNC: 119 MG/DL (ref 65–100)
GLUCOSE BLD STRIP.AUTO-MCNC: 180 MG/DL (ref 65–100)
GLUCOSE BLD STRIP.AUTO-MCNC: 191 MG/DL (ref 65–100)
GLUCOSE SERPL-MCNC: 118 MG/DL (ref 65–100)
HCT VFR BLD AUTO: 37.6 % (ref 36.6–50.3)
HGB BLD-MCNC: 12.8 G/DL (ref 12.1–17)
IMM GRANULOCYTES # BLD AUTO: 0 K/UL
IMM GRANULOCYTES NFR BLD AUTO: 0 %
LYMPHOCYTES # BLD: 1 K/UL (ref 0.8–3.5)
LYMPHOCYTES NFR BLD: 11 % (ref 12–49)
MCH RBC QN AUTO: 27.6 PG (ref 26–34)
MCHC RBC AUTO-ENTMCNC: 34 G/DL (ref 30–36.5)
MCV RBC AUTO: 81.2 FL (ref 80–99)
MONOCYTES # BLD: 0.3 K/UL (ref 0–1)
MONOCYTES NFR BLD: 4 % (ref 5–13)
NEUTS BAND NFR BLD MANUAL: 1 % (ref 0–6)
NEUTS SEG # BLD: 7.4 K/UL (ref 1.8–8)
NEUTS SEG NFR BLD: 84 % (ref 32–75)
NRBC # BLD: 0 K/UL (ref 0–0.01)
NRBC BLD-RTO: 0 PER 100 WBC
PLATELET # BLD AUTO: 228 K/UL (ref 150–400)
PMV BLD AUTO: 10.2 FL (ref 8.9–12.9)
POTASSIUM SERPL-SCNC: 3.5 MMOL/L (ref 3.5–5.1)
PROCALCITONIN SERPL-MCNC: 0.11 NG/ML
PROT SERPL-MCNC: 7.8 G/DL (ref 6.4–8.2)
RBC # BLD AUTO: 4.63 M/UL (ref 4.1–5.7)
RBC MORPH BLD: ABNORMAL
SERVICE CMNT-IMP: ABNORMAL
SODIUM SERPL-SCNC: 135 MMOL/L (ref 136–145)
WBC # BLD AUTO: 8.7 K/UL (ref 4.1–11.1)

## 2021-04-15 PROCEDURE — 36415 COLL VENOUS BLD VENIPUNCTURE: CPT

## 2021-04-15 PROCEDURE — 94660 CPAP INITIATION&MGMT: CPT

## 2021-04-15 PROCEDURE — 84145 PROCALCITONIN (PCT): CPT

## 2021-04-15 PROCEDURE — 77010033678 HC OXYGEN DAILY

## 2021-04-15 PROCEDURE — 74011250637 HC RX REV CODE- 250/637: Performed by: PHYSICIAN ASSISTANT

## 2021-04-15 PROCEDURE — 83880 ASSAY OF NATRIURETIC PEPTIDE: CPT

## 2021-04-15 PROCEDURE — 85025 COMPLETE CBC W/AUTO DIFF WBC: CPT

## 2021-04-15 PROCEDURE — 74011250637 HC RX REV CODE- 250/637: Performed by: INTERNAL MEDICINE

## 2021-04-15 PROCEDURE — 99233 SBSQ HOSP IP/OBS HIGH 50: CPT | Performed by: CLINICAL NURSE SPECIALIST

## 2021-04-15 PROCEDURE — 85379 FIBRIN DEGRADATION QUANT: CPT

## 2021-04-15 PROCEDURE — 74011250636 HC RX REV CODE- 250/636: Performed by: PHYSICIAN ASSISTANT

## 2021-04-15 PROCEDURE — 80053 COMPREHEN METABOLIC PANEL: CPT

## 2021-04-15 PROCEDURE — 74011250636 HC RX REV CODE- 250/636: Performed by: INTERNAL MEDICINE

## 2021-04-15 PROCEDURE — 65660000000 HC RM CCU STEPDOWN

## 2021-04-15 PROCEDURE — 86140 C-REACTIVE PROTEIN: CPT

## 2021-04-15 PROCEDURE — 82962 GLUCOSE BLOOD TEST: CPT

## 2021-04-15 RX ORDER — FLUTICASONE PROPIONATE 50 MCG
2 SPRAY, SUSPENSION (ML) NASAL
Status: DISCONTINUED | OUTPATIENT
Start: 2021-04-15 | End: 2021-04-16

## 2021-04-15 RX ORDER — FUROSEMIDE 10 MG/ML
20 INJECTION INTRAMUSCULAR; INTRAVENOUS ONCE
Status: COMPLETED | OUTPATIENT
Start: 2021-04-15 | End: 2021-04-15

## 2021-04-15 RX ORDER — POTASSIUM CHLORIDE 750 MG/1
40 TABLET, FILM COATED, EXTENDED RELEASE ORAL
Status: COMPLETED | OUTPATIENT
Start: 2021-04-15 | End: 2021-04-15

## 2021-04-15 RX ADMIN — Medication 10 ML: at 23:58

## 2021-04-15 RX ADMIN — Medication 10 ML: at 07:06

## 2021-04-15 RX ADMIN — Medication 10 ML: at 21:06

## 2021-04-15 RX ADMIN — METHYLPREDNISOLONE SODIUM SUCCINATE 40 MG: 40 INJECTION, POWDER, FOR SOLUTION INTRAMUSCULAR; INTRAVENOUS at 23:58

## 2021-04-15 RX ADMIN — Medication 10 ML: at 17:34

## 2021-04-15 RX ADMIN — Medication 2000 UNITS: at 09:03

## 2021-04-15 RX ADMIN — METHYLPREDNISOLONE SODIUM SUCCINATE 40 MG: 40 INJECTION, POWDER, FOR SOLUTION INTRAMUSCULAR; INTRAVENOUS at 00:02

## 2021-04-15 RX ADMIN — ENOXAPARIN SODIUM 40 MG: 40 INJECTION SUBCUTANEOUS at 17:34

## 2021-04-15 RX ADMIN — THERA TABS 1 TABLET: TAB at 09:03

## 2021-04-15 RX ADMIN — POTASSIUM CHLORIDE 40 MEQ: 750 TABLET, FILM COATED, EXTENDED RELEASE ORAL at 11:45

## 2021-04-15 RX ADMIN — FUROSEMIDE 20 MG: 10 INJECTION, SOLUTION INTRAMUSCULAR; INTRAVENOUS at 09:04

## 2021-04-15 RX ADMIN — ENOXAPARIN SODIUM 40 MG: 40 INJECTION SUBCUTANEOUS at 04:41

## 2021-04-15 RX ADMIN — METHYLPREDNISOLONE SODIUM SUCCINATE 40 MG: 40 INJECTION, POWDER, FOR SOLUTION INTRAMUSCULAR; INTRAVENOUS at 11:45

## 2021-04-15 RX ADMIN — FLUTICASONE PROPIONATE 2 SPRAY: 50 SPRAY, METERED NASAL at 23:58

## 2021-04-15 RX ADMIN — METHYLPREDNISOLONE SODIUM SUCCINATE 40 MG: 40 INJECTION, POWDER, FOR SOLUTION INTRAMUSCULAR; INTRAVENOUS at 17:34

## 2021-04-15 RX ADMIN — METHYLPREDNISOLONE SODIUM SUCCINATE 40 MG: 40 INJECTION, POWDER, FOR SOLUTION INTRAMUSCULAR; INTRAVENOUS at 07:06

## 2021-04-15 NOTE — PROGRESS NOTES
2200: pts BG was 300. Checked with the hospital glucometer. Pt administered 22u of insulin through personal insulin pump    0730: Bedside and Verbal shift change report given to [address] [place] [address] Nj  (oncoming nurse) by Alejandra Sainz RN (offgoing nurse). Report included the following information SBAR, Kardex, ED Summary, Intake/Output, MAR, Recent Results and Cardiac Rhythm NSR. Problem: Diabetes Self-Management  Goal: *Using medications safely  Description: State effect of diabetes medications on diabetes; name diabetes medication taking, action and side effects. Outcome: Progressing Towards Goal  Goal: *Monitoring blood glucose, interpreting and using results  Description: Identify recommended blood glucose targets  and personal targets. Outcome: Progressing Towards Goal  Goal: *Sick day guidelines  Outcome: Progressing Towards Goal     Problem: Falls - Risk of  Goal: *Absence of Falls  Description: Document Tomi Fall Risk and appropriate interventions in the flowsheet.   Outcome: Progressing Towards Goal  Note: Fall Risk Interventions:     Medication Interventions: Teach patient to arise slowly, Patient to call before getting OOB    Problem: High Risk or History of SHALOM  Goal: Maintain Patent Airway and Adequate Oxygenation  Outcome: Progressing Towards Goal     Problem: Breathing Pattern - Ineffective  Goal: *Use of effective breathing techniques  Outcome: Progressing Towards Goal

## 2021-04-15 NOTE — DIABETES MGMT
[address] [facility]    CLINICAL NURSE SPECIALIST CONSULT   FOLLOW UP NOTE    Initial Presentation   J Luis Torres is a 39 y.o. male who presents to the ED 4/12/21 with a 4 day complaint of diarrhea with abdominal pain. He had a rapid COVID-19 test which was positive    HX:   Past Medical History:   Diagnosis Date    DM (diabetes mellitus) ([facility] [address] Age 32    HLD (hyperlipidaemia)     HTN         DX: Gastritis, COVID-19 pneumonia    TX: IV steroids/IV antibiotics     Hospital course   Clinical progress has been uncomplicated. Diabetes    Patient has known Type 2 diabetes, treated with Metformin and Humalog PTA. Family history positive for diabetes: Mother with Type 2 Diabetes     Admission  and A1c 9.3% indicate poor diabetes control. Ambulatory blood glucose management provided by endocrinologist: Ciara Escalante MD with [facility].     Consulted by Danelle Reaves MD for advanced diabetes nursing assessment and care, specifically related to    [x] Inpatient management strategy      Diabetes-related medical history  Acute complications: Hyperglycemia  Neurological complications  Peripheral neuropathy  Microvascular disease: None  Macrovascular disease: None      Diabetes medication history  Drug class Currently in use Discontinued Never used   Biguanide Metformin 500mg at breakfast and dinner     DDP-4 inhibitor       Sulfonylurea      Thiazolidinedione      GLP-1 RA      SGLT-2 inhibitors      Basal insulin      Bolus insulin      Fixed Dose  Combinations Insulin pump via settings below       Pump settings:  - basal: 12a: 6.35 units/hr   - Carb ratio: 1:3  - sensitivity: 10  - target: 100-120  - active insulin time: 3 hr  Subjective   Type 2 Diabetes    GFR 60  A1C 9.4%  Blood cultures with NGTD  Switched to methylpred 40mg Q6 (from 40mg Q8) with plans to start weaning tomorrow  S/p actremra 4/14  24 hr glucose pattern: 334-412     Fasting glucose 106  24 hr glucose pattern: 106-300  Total daily insulin dose the last 24 hs: 355 units  Basal rate: 6.35 units/hr    4/13/21  8a , 23.4 units correctional  Breakfast Bolus: 13.6 units   11a , 25.6 units correctional   1p 25 units given with 40mg solumedrol     Lunch Bolus: 10.65   5p , 21.2 units correctional   7p 25 units given with 40mg solumedrol    10p , 24.8 units correctional   4/14/21  MN 25 units given with 40mg solumedrol    5a 20.8 units- plugged in 61 grams CHO but patient does not remember eating or why this dose was given    730a 25 units given with 40mg solumedrol      Objective   Physical exam  General Alert, oriented and in no acute distress/ill-appearing. Conversant and cooperative. Vital Signs   Visit Vitals  /66   Pulse 83   Temp 97.9 °F (36.6 °C)   Resp 16   Ht 5' 10\" (1.778 m)   Wt 152.2 kg (335 lb 8.6 oz)   SpO2 92%   BMI 48.14 kg/m²     Skin  Warm and dry. Acanthosis noted along neckline. No lipohypertrophy or lipoatrophy noted at injection sites   Heart   Regular rate and rhythm. No murmurs, rubs or gallops  Lungs  Clear to auscultation without rales or rhonchi  Extremities No foot wounds      Laboratory      CBC WITH AUTOMATED DIFF    Collection Time: 04/15/21  7:24 AM   Result Value Ref Range    WBC 8.7 4.1 - 11.1 K/uL    RBC 4.63 4.10 - 5.70 M/uL    HGB 12.8 12.1 - 17.0 g/dL    HCT 37.6 36.6 - 50.3 %    MCV 81.2 80.0 - 99.0 FL    MCH 27.6 26.0 - 34.0 PG    MCHC 34.0 30.0 - 36.5 g/dL    RDW 12.1 11.5 - 14.5 %    PLATELET 159 251 - 863 K/uL    MPV 10.2 8.9 - 12.9 FL    NRBC 0.0 0  WBC    ABSOLUTE NRBC 0.00 0.00 - 0.01 K/uL    NEUTROPHILS 84 (H) 32 - 75 %    BAND NEUTROPHILS 1 0 - 6 %    LYMPHOCYTES 11 (L) 12 - 49 %    MONOCYTES 4 (L) 5 - 13 %    EOSINOPHILS 0 0 - 7 %    BASOPHILS 0 0 - 1 %    IMMATURE GRANULOCYTES 0 %    ABS. NEUTROPHILS 7.4 1.8 - 8.0 K/UL    ABS. LYMPHOCYTES 1.0 0.8 - 3.5 K/UL    ABS. MONOCYTES 0.3 0.0 - 1.0 K/UL    ABS. EOSINOPHILS 0.0 0.0 - 0.4 K/UL    ABS. BASOPHILS 0.0 0.0 - 0.1 K/UL    ABS. IMM. GRANS. 0.0 K/UL    DF AUTOMATED      RBC COMMENTS NORMOCYTIC, NORMOCHROMIC           METABOLIC PANEL, COMPREHENSIVE    Collection Time: 04/15/21  7:24 AM   Result Value Ref Range    Sodium 135 (L) 136 - 145 mmol/L    Potassium 3.5 3.5 - 5.1 mmol/L    Chloride 103 97 - 108 mmol/L    CO2 25 21 - 32 mmol/L    Anion gap 7 5 - 15 mmol/L    Glucose 118 (H) 65 - 100 mg/dL    BUN 25 (H) 6 - 20 MG/DL    Creatinine 0.87 0.70 - 1.30 MG/DL    BUN/Creatinine ratio 29 (H) 12 - 20      GFR est AA >60 >60 ml/min/1.73m2    GFR est non-AA >60 >60 ml/min/1.73m2    Calcium 8.9 8.5 - 10.1 MG/DL    Bilirubin, total 0.5 0.2 - 1.0 MG/DL    ALT (SGPT) 176 (H) 12 - 78 U/L    AST (SGOT) 85 (H) 15 - 37 U/L    Alk. phosphatase 40 (L) 45 - 117 U/L    Protein, total 7.8 6.4 - 8.2 g/dL    Albumin 2.9 (L) 3.5 - 5.0 g/dL    Globulin 4.9 (H) 2.0 - 4.0 g/dL    A-G Ratio 0.6 (L) 1.1 - 2.2         All inpatient labs reviewed in full    Factors impacting BG management  Factor Dose Comments   Nutrition:  Carb-controlled meals     60 grams/meal      Drugs:  Steroids   Methylprednisolone 40mg Q6 Methylprednisolone Dosing  8mg of Methylprednisolone dose is equivalent to 0. 1units/kg NPH    16 of Methylprednisolone  dose is equivalent to    0.2units/kg NPH  24 of Methylprednisolone  dose is equivalent to 0.3units/kg NPH    36+ of Methylprednisolone  dose is equivalent to 0.4units/kg NPH   Infection/COVID-19  Hyperglycemia in the setting of COVID-19 infection is associated with a blunted immune response and delayed recovery. The subcutaneous insulin order set can be initiated to optimize his blood glucose control in the impatient setting.       Blood glucose pattern        Assessment and Plan   Nursing Diagnosis Risk for unstable blood glucose pattern   Nursing Intervention Domain 7222 Decision-making Support   Nursing Interventions Examined current inpatient diabetes control Explored factors facilitating and impeding inpatient management  Identified self-management practices impeding diabetes control  Explored corrective strategies with patient and responsible inpatient provider   Informed patient of rational for insulin strategy while hospitalized     Evaluation   This  gentleman, with Type 2 diabetes, on very high dose insulin via Medtronic MiniMed insulin pump. did not achieve diabetes control prior to admission, as evidenced by admission BG of 397 and A1c of 9.3%. During this hospitalization, his insulin pump has been resumed and the patient has not achieved inpatient blood glucose target of 100-180mg/dl despite requiring up to 300 units yesterday. Several factors have played a role in blood glucose management including:  [x] Critical nature of illness state  [x] Glucocorticoid use    The Subcutaneous Insulin Order set (7958) has not been in use but insulin pump initiated just now. Hence, the next step in optimizing blood glucose control would be    [x]  Optimize insulin pump settings     Patient at very high risk for steroid induced hyperglycemia at current doses. Please keep watchful eye on glucose pattern and may need additional SQ insulin on top of pump settings. Recommendations   1. Patient awake and able to independently utilize insulin pump at PTA insulin settings at this time. Patient told to notify RN of ALL insulin boluses. 2. Agree for patient to bolus 25 units with each dose of 40 mg Solumedrol. Please add order in EMR: patient to administer 25 units humalog off insulin pump for each 40 mg solumedrol dose. If glucose below 100, please reduce to 15 units with each steroid dose. 3. POC glucose ACHS. 4. Patient to bolus off the pump for all carbohydrate intake    5. Patient to enter blood glucose into pump to deliver additional correctional insulin ACHS.       6. RN to document all insulin doses given to patient off insulin pump in EMR (under LDA flow sheet)        If insulin pump needs to be removed (with current steroid order- solumedrol 40mg Q6) back-up settings are:  Basal: 75 units NPH Q12  Bolus: 1 unit Humalog for every 3 grams Carbs  Correctional: 1 unit Humalog for every 10 points over 200  Billing Code(s)   [x] 65418       Before making these care recommendations, I personally reviewed the hosptialization record, including laboratory and diagnostic data, medications and examined the patient at bedside (circumstances permitting).   Total minutes: [id number] JOSÉ MIGUEL Samaniego  Diabetes Clinical Nurse Specialist  [facility]  Access via [employer]

## 2021-04-15 NOTE — PROGRESS NOTES
Pulmonary, Critical Care, and Sleep Medicine~Progress Note    Name: Zak Borges. MRN: [id number]   : 1975 Hospital: . [facility]    Date: 4/15/2021 11:33 AM Admission: 2021     Impression Plan   1. COVID 19 + PNA, B + blood type. Vit d deficient   2. Diaerrha, suspect due to above, improving    3. Elevated liver enzymes, improving. Suspected to be induced by COVID19  4. SHALOM, on home CPAP  5. DM II  6. HLD 1. Monitoring inflammatory markers   2. D dimer, probnp, monitor   3. Cpap at night   4. Solu medrol; start reducing tomorrow   5. O2 titration above 90%   6. S/p actemra on   7. Chest x-ray tomorrow  8. Diuresis    9. lovenox bid dosing      Daily Progression:    4/15  0.51 d dimer   CRP 6.8  probnp 64  Feeling better following actemra    Nasal congestion noted today       Now requiring O2  Feels ok; wore NIV overnight  D dimer 0.42  CRP 9.93       Consult Note requested by hospitalist     Patient presented with increased abdominal pains and diarrhea for the past 5 days. No overt pulmonary complaints were noted. Currently on room air. No pervious pulmonary complaints noted. Still spiking fevers, but feeling better. I have reviewed the labs and previous days notes. Pertinent items are noted in HPI. Past Medical History:   Diagnosis Date    DM (diabetes mellitus) ([facility] [address] Age 29    HLD (hyperlipidaemia)     HTN       Past Surgical History:   Procedure Laterality Date    HX ORTHOPAEDIC Left 1998    pins in toes of foot    HX REFRACTIVE SURGERY        Prior to Admission medications    Medication Sig Start Date End Date Taking? Authorizing Provider   insulin pump (PATIENT SUPPLIED) [facility] by SubCUTAneous route continuous.    Yes Provider, Historical   metFORMIN ER (GLUCOPHAGE XR) 500 mg tablet TAKE 2 TABLETS WITH BREAKFAST AND DINNER 3/31/21  Yes Kamryn Rojo MD   atorvastatin (LIPITOR) 10 mg tablet TAKE 1 TABLET DAILY 3/3/21  Yes Ricke Reges, MD   losartan (COZAAR) 100 mg tablet TAKE 1 TABLET DAILY 20  Yes Claudette Sousa MD   chlorthalidone (HYGROTEN) 25 mg tablet TAKE 1 TABLET DAILY 20  Yes Claudette Sousa MD   multivitamin (DAILY MULTI-VITAMIN) tablet Take 1 Tab by mouth daily. Yes Provider, Historical   aspirin 81 mg tablet Take 81 mg by mouth daily. Yes Provider, Historical     Allergies   Allergen Reactions    Lisinopril Cough      Social History     Tobacco Use    Smoking status: Never Smoker    Smokeless tobacco: Never Used   Substance Use Topics    Alcohol use: Yes     Alcohol/week: 0.0 standard drinks     Comment: glass of wine 1-2 times a month      Family History   Problem Relation Age of Onset    Diabetes Mother     Heart Attack Father 61    Diabetes Maternal Grandmother     Diabetes Maternal Grandfather     Diabetes Paternal Grandmother     Diabetes Paternal Grandfather      OBJECTIVE:     Vital Signs:       Visit Vitals  /66   Pulse 83   Temp 97.9 °F (36.6 °C)   Resp 16   Ht 5' 10\" (1.778 m)   Wt 152.2 kg (335 lb 8.6 oz)   SpO2 92%   BMI 48.14 kg/m²      Temp (24hrs), Av.3 °F (36.8 °C), Min:97.8 °F (36.6 °C), Max:99.3 °F (37.4 °C)     Intake/Output:     Last shift: No intake/output data recorded.     Last 3 shifts:  1901 - 04/15 0700  In: 400 [P.O.:400]  Out: -         No intake or output data in the 24 hours ending 04/15/21 0905    Physical Exam:                                        Exam Findings Other   General: No resp distress noted, appears stated age    [de-identified]:  No ulcers, JVD not elevated, no cervical LAD    Chest: No pectus deformity, normal chest rise b/l    HEART:  No visible thrills    Lungs:  Normal expansion     ABD: Soft/NT, non rigid mildly distended    EXT: No cyanosis/clubbing/edema, normal peripheral pulses    Skin: No rashes or ulcers, no mottling    Neuro: A/O x 3        Medications:  Current Facility-Administered Medications   Medication Dose Route Frequency    sodium chloride (OCEAN) 0.65 % nasal squeeze bottle 2 Spray  2 Spray Both Nostrils Q2H PRN    fluticasone propionate (FLONASE) 50 mcg/actuation nasal spray 2 Spray  2 Spray Both Nostrils QHS    methylPREDNISolone (PF) (SOLU-MEDROL) injection 40 mg  40 mg IntraVENous Q6H    insulin pump (PATIENT SUPPLIED)   SubCUTAneous PRN    sodium chloride (NS) flush 5-10 mL  5-10 mL IntraVENous PRN    sodium chloride (NS) flush 5-40 mL  5-40 mL IntraVENous Q8H    sodium chloride (NS) flush 5-40 mL  5-40 mL IntraVENous PRN    acetaminophen (TYLENOL) tablet 650 mg  650 mg Oral Q6H PRN    Or    acetaminophen (TYLENOL) suppository 650 mg  650 mg Rectal Q6H PRN    polyethylene glycol (MIRALAX) packet 17 g  17 g Oral DAILY PRN    promethazine (PHENERGAN) tablet 12.5 mg  12.5 mg Oral Q6H PRN    Or    ondansetron (ZOFRAN) injection 4 mg  4 mg IntraVENous Q6H PRN    cholecalciferol (VITAMIN D3) (1000 Units /25 mcg) tablet 2,000 Units  2,000 Units Oral DAILY    guaiFENesin-dextromethorphan (ROBITUSSIN DM) 100-10 mg/5 mL syrup 5 mL  5 mL Oral Q4H PRN    benzonatate (TESSALON) capsule 200 mg  200 mg Oral TID PRN    therapeutic multivitamin (THERAGRAN) tablet 1 Tab  1 Tab Oral DAILY    enoxaparin (LOVENOX) injection 40 mg  40 mg SubCUTAneous Q12H    glucose chewable tablet 16 g  4 Tab Oral PRN    dextrose (D50W) injection syrg 12.5-25 g  25-50 mL IntraVENous PRN    glucagon (GLUCAGEN) injection 1 mg  1 mg IntraMUSCular PRN       Labs:  ABG Recent Labs     04/12/21  1131   PHI 7.46*   PCO2I 30.3*   PO2I 53*   HCO3I 21.4*   SO2I 89*        CBC Recent Labs     04/15/21  0724 04/14/21  0501 04/13/21  0715   WBC 8.7 5.9 4.4   HGB 12.8 12.6 12.5   HCT 37.6 37.8 36.6    180 148*   MCV 81.2 83.4 80.8   MCH 27.6 27.8 35.9        Metabolic  Panel Recent Labs     04/15/21  0724 04/14/21  0501 04/13/21  0715 04/12/21  1102   * 132* 130* 126*   K 3.5 4.0 3.6 3.6    98 98 93*   CO2 25 22 21 23   * 305* 178* 397*   BUN 25* 26* 28* 23*   CREA 0.87 1.13 1.16 1.41*   CA 8.9 9.1 8.8 8.3*   MG  --   --   --  1.8   PHOS  --   --   --  2.8   ALB 2.9* 3.2* 3.2* 3.4*   * 192* 155* 117*   INR  --   --  1.1  --         Pertinent Labs                Jordan Grissom PA-C  4/15/2021

## 2021-04-15 NOTE — PROGRESS NOTES
0900- pts O2 sats down to 84% on room air, pt put on 2L NC and educated on deep breathing techniques, pt came up to 88-90%, Dr. Arcelia Tavarez notified of above    Bedside shift change report given to Davidson Mckenna RN (oncoming nurse) by Jeremy Lynch RN (offgoing nurse). Report included the following information SBAR, Kardex, ED Summary, Intake/Output, MAR, Accordion, Recent Results, Med Rec Status, Cardiac Rhythm NSR and Alarm Parameters .

## 2021-04-16 ENCOUNTER — APPOINTMENT (OUTPATIENT)
Dept: GENERAL RADIOLOGY | Age: 46
DRG: 871 | End: 2021-04-16
Attending: INTERNAL MEDICINE
Payer: COMMERCIAL

## 2021-04-16 LAB
ALBUMIN SERPL-MCNC: 3 G/DL (ref 3.5–5)
ALBUMIN/GLOB SERPL: 0.6 {RATIO} (ref 1.1–2.2)
ALP SERPL-CCNC: 47 U/L (ref 45–117)
ALT SERPL-CCNC: 190 U/L (ref 12–78)
ANION GAP SERPL CALC-SCNC: 8 MMOL/L (ref 5–15)
AST SERPL-CCNC: 84 U/L (ref 15–37)
BASOPHILS # BLD: 0 K/UL (ref 0–0.1)
BASOPHILS NFR BLD: 0 % (ref 0–1)
BILIRUB SERPL-MCNC: 0.6 MG/DL (ref 0.2–1)
BNP SERPL-MCNC: 49 PG/ML
BUN SERPL-MCNC: 29 MG/DL (ref 6–20)
BUN/CREAT SERPL: 30 (ref 12–20)
CALCIUM SERPL-MCNC: 8.8 MG/DL (ref 8.5–10.1)
CHLORIDE SERPL-SCNC: 104 MMOL/L (ref 97–108)
CO2 SERPL-SCNC: 25 MMOL/L (ref 21–32)
CREAT SERPL-MCNC: 0.97 MG/DL (ref 0.7–1.3)
CRP SERPL-MCNC: 3.51 MG/DL (ref 0–0.6)
D DIMER PPP FEU-MCNC: 0.57 MG/L FEU (ref 0–0.65)
DIFFERENTIAL METHOD BLD: ABNORMAL
EOSINOPHIL # BLD: 0 K/UL (ref 0–0.4)
EOSINOPHIL NFR BLD: 0 % (ref 0–7)
ERYTHROCYTE [DISTWIDTH] IN BLOOD BY AUTOMATED COUNT: 12.2 % (ref 11.5–14.5)
GLOBULIN SER CALC-MCNC: 4.8 G/DL (ref 2–4)
GLUCOSE BLD STRIP.AUTO-MCNC: 115 MG/DL (ref 65–100)
GLUCOSE BLD STRIP.AUTO-MCNC: 136 MG/DL (ref 65–100)
GLUCOSE BLD STRIP.AUTO-MCNC: 190 MG/DL (ref 65–100)
GLUCOSE BLD STRIP.AUTO-MCNC: 98 MG/DL (ref 65–100)
GLUCOSE SERPL-MCNC: 103 MG/DL (ref 65–100)
HCT VFR BLD AUTO: 39.8 % (ref 36.6–50.3)
HGB BLD-MCNC: 13.3 G/DL (ref 12.1–17)
IMM GRANULOCYTES # BLD AUTO: 0 K/UL
IMM GRANULOCYTES NFR BLD AUTO: 0 %
LYMPHOCYTES # BLD: 0.3 K/UL (ref 0.8–3.5)
LYMPHOCYTES NFR BLD: 3 % (ref 12–49)
MCH RBC QN AUTO: 27.5 PG (ref 26–34)
MCHC RBC AUTO-ENTMCNC: 33.4 G/DL (ref 30–36.5)
MCV RBC AUTO: 82.2 FL (ref 80–99)
MONOCYTES # BLD: 0.3 K/UL (ref 0–1)
MONOCYTES NFR BLD: 3 % (ref 5–13)
NEUTS BAND NFR BLD MANUAL: 1 % (ref 0–6)
NEUTS SEG # BLD: 10.9 K/UL (ref 1.8–8)
NEUTS SEG NFR BLD: 93 % (ref 32–75)
NRBC # BLD: 0 K/UL (ref 0–0.01)
NRBC BLD-RTO: 0 PER 100 WBC
PLATELET # BLD AUTO: 272 K/UL (ref 150–400)
PMV BLD AUTO: 10.2 FL (ref 8.9–12.9)
POTASSIUM SERPL-SCNC: 3.7 MMOL/L (ref 3.5–5.1)
PROT SERPL-MCNC: 7.8 G/DL (ref 6.4–8.2)
RBC # BLD AUTO: 4.84 M/UL (ref 4.1–5.7)
RBC MORPH BLD: ABNORMAL
SERVICE CMNT-IMP: ABNORMAL
SERVICE CMNT-IMP: NORMAL
SODIUM SERPL-SCNC: 137 MMOL/L (ref 136–145)
WBC # BLD AUTO: 11.5 K/UL (ref 4.1–11.1)

## 2021-04-16 PROCEDURE — 74011250636 HC RX REV CODE- 250/636: Performed by: PHYSICIAN ASSISTANT

## 2021-04-16 PROCEDURE — 74011250637 HC RX REV CODE- 250/637: Performed by: INTERNAL MEDICINE

## 2021-04-16 PROCEDURE — 74011250636 HC RX REV CODE- 250/636: Performed by: INTERNAL MEDICINE

## 2021-04-16 PROCEDURE — 77010033711 HC HIGH FLOW OXYGEN

## 2021-04-16 PROCEDURE — 94664 DEMO&/EVAL PT USE INHALER: CPT

## 2021-04-16 PROCEDURE — 65660000000 HC RM CCU STEPDOWN

## 2021-04-16 PROCEDURE — 85379 FIBRIN DEGRADATION QUANT: CPT

## 2021-04-16 PROCEDURE — 36415 COLL VENOUS BLD VENIPUNCTURE: CPT

## 2021-04-16 PROCEDURE — 86140 C-REACTIVE PROTEIN: CPT

## 2021-04-16 PROCEDURE — 94640 AIRWAY INHALATION TREATMENT: CPT

## 2021-04-16 PROCEDURE — 99233 SBSQ HOSP IP/OBS HIGH 50: CPT | Performed by: CLINICAL NURSE SPECIALIST

## 2021-04-16 PROCEDURE — 85025 COMPLETE CBC W/AUTO DIFF WBC: CPT

## 2021-04-16 PROCEDURE — 82962 GLUCOSE BLOOD TEST: CPT

## 2021-04-16 PROCEDURE — 80053 COMPREHEN METABOLIC PANEL: CPT

## 2021-04-16 PROCEDURE — 71045 X-RAY EXAM CHEST 1 VIEW: CPT

## 2021-04-16 PROCEDURE — 83880 ASSAY OF NATRIURETIC PEPTIDE: CPT

## 2021-04-16 PROCEDURE — 77010033678 HC OXYGEN DAILY

## 2021-04-16 RX ORDER — LOSARTAN POTASSIUM 50 MG/1
100 TABLET ORAL DAILY
Status: DISCONTINUED | OUTPATIENT
Start: 2021-04-17 | End: 2021-04-23 | Stop reason: HOSPADM

## 2021-04-16 RX ORDER — FLUTICASONE PROPIONATE 50 MCG
2 SPRAY, SUSPENSION (ML) NASAL
Status: DISCONTINUED | OUTPATIENT
Start: 2021-04-16 | End: 2021-04-23 | Stop reason: HOSPADM

## 2021-04-16 RX ORDER — ALBUTEROL SULFATE 90 UG/1
2 AEROSOL, METERED RESPIRATORY (INHALATION)
Status: DISCONTINUED | OUTPATIENT
Start: 2021-04-16 | End: 2021-04-23 | Stop reason: HOSPADM

## 2021-04-16 RX ORDER — FUROSEMIDE 10 MG/ML
20 INJECTION INTRAMUSCULAR; INTRAVENOUS ONCE
Status: COMPLETED | OUTPATIENT
Start: 2021-04-16 | End: 2021-04-16

## 2021-04-16 RX ORDER — ASPIRIN 81 MG/1
81 TABLET ORAL DAILY
Status: DISCONTINUED | OUTPATIENT
Start: 2021-04-17 | End: 2021-04-23 | Stop reason: HOSPADM

## 2021-04-16 RX ADMIN — FLUTICASONE PROPIONATE 2 SPRAY: 50 SPRAY, METERED NASAL at 21:00

## 2021-04-16 RX ADMIN — METHYLPREDNISOLONE SODIUM SUCCINATE 40 MG: 40 INJECTION, POWDER, FOR SOLUTION INTRAMUSCULAR; INTRAVENOUS at 15:24

## 2021-04-16 RX ADMIN — Medication 2000 UNITS: at 09:28

## 2021-04-16 RX ADMIN — Medication 10 ML: at 15:29

## 2021-04-16 RX ADMIN — THERA TABS 1 TABLET: TAB at 09:27

## 2021-04-16 RX ADMIN — Medication 10 ML: at 21:00

## 2021-04-16 RX ADMIN — SALINE NASAL SPRAY 2 SPRAY: 1.5 SOLUTION NASAL at 15:24

## 2021-04-16 RX ADMIN — ALBUTEROL SULFATE 2 PUFF: 90 AEROSOL, METERED RESPIRATORY (INHALATION) at 21:46

## 2021-04-16 RX ADMIN — FUROSEMIDE 20 MG: 10 INJECTION, SOLUTION INTRAMUSCULAR; INTRAVENOUS at 09:28

## 2021-04-16 RX ADMIN — ALBUTEROL SULFATE 2 PUFF: 90 AEROSOL, METERED RESPIRATORY (INHALATION) at 15:31

## 2021-04-16 RX ADMIN — Medication 10 ML: at 07:10

## 2021-04-16 RX ADMIN — ENOXAPARIN SODIUM 40 MG: 40 INJECTION SUBCUTANEOUS at 15:28

## 2021-04-16 RX ADMIN — METHYLPREDNISOLONE SODIUM SUCCINATE 40 MG: 40 INJECTION, POWDER, FOR SOLUTION INTRAMUSCULAR; INTRAVENOUS at 07:10

## 2021-04-16 RX ADMIN — ENOXAPARIN SODIUM 40 MG: 40 INJECTION SUBCUTANEOUS at 04:27

## 2021-04-16 NOTE — DIABETES MGMT
[address] [place] [address]    CLINICAL NURSE SPECIALIST CONSULT   FOLLOW UP NOTE    Initial Presentation   Mariza Askew is a 39 y.o. male who presents to the ED 4/12/21 with a 4 day complaint of diarrhea with abdominal pain. He had a rapid COVID-19 test which was positive    HX:   Past Medical History:   Diagnosis Date    DM (diabetes mellitus) ([facility] [address] Age 32    HLD (hyperlipidaemia)     HTN         DX: Gastritis, COVID-19 pneumonia    TX: IV steroids/IV antibiotics     Hospital course   Clinical progress has been uncomplicated. Diabetes    Patient has known Type 2 diabetes, treated with Metformin and Humalog PTA. Family history positive for diabetes: Mother with Type 2 Diabetes     Admission  and A1c 9.3% indicate poor diabetes control. Ambulatory blood glucose management provided by endocrinologist: Herberth Pablo MD with [facility].     Consulted by Pro Carranza MD for advanced diabetes nursing assessment and care, specifically related to    [x] Inpatient management strategy      Diabetes-related medical history  Acute complications: Hyperglycemia  Neurological complications  Peripheral neuropathy  Microvascular disease: None  Macrovascular disease: None      Diabetes medication history  Drug class Currently in use Discontinued Never used   Biguanide Metformin 500mg at breakfast and dinner     DDP-4 inhibitor       Sulfonylurea      Thiazolidinedione      GLP-1 RA      SGLT-2 inhibitors      Basal insulin      Bolus insulin      Fixed Dose  Combinations Insulin pump via settings below       Pump settings:  - basal: 12a: 6.35 units/hr   - Carb ratio: 1:3  - sensitivity: 10  - target: 100-120  - active insulin time: 3 hr  Subjective   O2 requirements increased in the last 24hrs, now on midflow O2 with humidification   A1C 9.4%  Blood cultures with NGTD  Continued on methylpred 40mg Q6 may wean over the weekend pending patient assessment  S/p ally 4/14    Continues on insulin pump  Fasting glucose 115  24 hr glucose pattern: 115-180    Total daily insulin dose the last 24 hs: 326 units  Basal rate: 6.35 units/hr    4/15/21  MN 25 units given with 40mg solumedrol    5a 20.8 units- plugged in 61 grams CHO but patient does not remember eating or why this dose was given    730a 25 units given with 40mg solumedrol    1010 8.6 units for 26 grams CHO   1153 20.65 units for 62 grams CHO   219pm 22.65 units for 68 grams CHO   536pm , 6.3 units correctional   551pm 25 units with Solumedrol   702pm 16.65 units for 50 grams CHO   910pm , 6.45 units    4/16/21    MN 25 units with Solumedrol   7a 25 units with Solumedrol   942a 16.65 units for 50g CHO             Objective   Physical exam  General Alert, oriented and in no acute distress/ill-appearing. Conversant and cooperative. Vital Signs   Visit Vitals  /68 (BP 1 Location: Right upper arm, BP Patient Position: At rest)   Pulse 96   Temp 98.3 °F (36.8 °C)   Resp 25   Ht 5' 10\" (1.778 m)   Wt 158.7 kg (349 lb 13.9 oz)   SpO2 93%   BMI 50.20 kg/m²     Skin  Warm and dry. Acanthosis noted along neckline. No lipohypertrophy or lipoatrophy noted at injection sites   Heart   Regular rate and rhythm.  No murmurs, rubs or gallops  Lungs  Clear to auscultation without rales or rhonchi  Extremities No foot wounds      Laboratory      CBC WITH AUTOMATED DIFF    Collection Time: 04/16/21  5:15 AM   Result Value Ref Range    WBC 11.5 (H) 4.1 - 11.1 K/uL    RBC 4.84 4.10 - 5.70 M/uL    HGB 13.3 12.1 - 17.0 g/dL    HCT 39.8 36.6 - 50.3 %    MCV 82.2 80.0 - 99.0 FL    MCH 27.5 26.0 - 34.0 PG    MCHC 33.4 30.0 - 36.5 g/dL    RDW 12.2 11.5 - 14.5 %    PLATELET 886 191 - 350 K/uL    MPV 10.2 8.9 - 12.9 FL    NRBC 0.0 0  WBC    ABSOLUTE NRBC 0.00 0.00 - 0.01 K/uL    NEUTROPHILS 93 (H) 32 - 75 %    BAND NEUTROPHILS 1 0 - 6 %    LYMPHOCYTES 3 (L) 12 - 49 %    MONOCYTES 3 (L) 5 - 13 %    EOSINOPHILS 0 0 - 7 %    BASOPHILS 0 0 - 1 %    IMMATURE GRANULOCYTES 0 %    ABS. NEUTROPHILS 10.9 (H) 1.8 - 8.0 K/UL    ABS. LYMPHOCYTES 0.3 (L) 0.8 - 3.5 K/UL    ABS. MONOCYTES 0.3 0.0 - 1.0 K/UL    ABS. EOSINOPHILS 0.0 0.0 - 0.4 K/UL    ABS. BASOPHILS 0.0 0.0 - 0.1 K/UL    ABS. IMM. GRANS. 0.0 K/UL    DF MANUAL      RBC COMMENTS NORMOCYTIC, NORMOCHROMIC           METABOLIC PANEL, COMPREHENSIVE    Collection Time: 04/16/21  5:15 AM   Result Value Ref Range    Sodium 137 136 - 145 mmol/L    Potassium 3.7 3.5 - 5.1 mmol/L    Chloride 104 97 - 108 mmol/L    CO2 25 21 - 32 mmol/L    Anion gap 8 5 - 15 mmol/L    Glucose 103 (H) 65 - 100 mg/dL    BUN 29 (H) 6 - 20 MG/DL    Creatinine 0.97 0.70 - 1.30 MG/DL    BUN/Creatinine ratio 30 (H) 12 - 20      GFR est AA >60 >60 ml/min/1.73m2    GFR est non-AA >60 >60 ml/min/1.73m2    Calcium 8.8 8.5 - 10.1 MG/DL    Bilirubin, total 0.6 0.2 - 1.0 MG/DL    ALT (SGPT) 190 (H) 12 - 78 U/L    AST (SGOT) 84 (H) 15 - 37 U/L    Alk. phosphatase 47 45 - 117 U/L    Protein, total 7.8 6.4 - 8.2 g/dL    Albumin 3.0 (L) 3.5 - 5.0 g/dL    Globulin 4.8 (H) 2.0 - 4.0 g/dL    A-G Ratio 0.6 (L) 1.1 - 2.2         All inpatient labs reviewed in full    Factors impacting BG management  Factor Dose Comments   Nutrition:  Carb-controlled meals     60 grams/meal      Drugs:  Steroids   Methylprednisolone 40mg Q6 Methylprednisolone Dosing  8mg of Methylprednisolone dose is equivalent to 0. 1units/kg NPH    16 of Methylprednisolone  dose is equivalent to    0.2units/kg NPH  24 of Methylprednisolone  dose is equivalent to 0.3units/kg NPH    36+ of Methylprednisolone  dose is equivalent to 0.4units/kg NPH   Infection/COVID-19  Hyperglycemia in the setting of COVID-19 infection is associated with a blunted immune response and delayed recovery. The subcutaneous insulin order set can be initiated to optimize his blood glucose control in the impatient setting.       Blood glucose pattern        Assessment and Plan   Nursing Diagnosis Risk for unstable blood glucose pattern   Nursing Intervention Domain 5255 Decision-making Support   Nursing Interventions Examined current inpatient diabetes control   Explored factors facilitating and impeding inpatient management  Identified self-management practices impeding diabetes control  Explored corrective strategies with patient and responsible inpatient provider   Informed patient of rational for insulin strategy while hospitalized     Evaluation   This  gentleman, with Type 2 diabetes, on very high dose insulin via [employer] MiniMed insulin pump did not achieve diabetes control prior to admission, as evidenced by admission BG of 397 and A1c of 9.3%. During this hospitalization, his insulin pump has been resumed and the patient has achieved inpatient blood glucose target of 100-180mg/dl requiring up to 350 units of insulin/day. Several factors have played a role in blood glucose management including:  [x] Critical nature of illness state  [x] Glucocorticoid use    The Subcutaneous Insulin Order set (4721) has not been in use but insulin pump initiated. The PTA pump settings and additional bolus for steroid use is optimally controlling blood glucose. Glucose control will assist in recovery of COVID-19 pneumonia. As steroids are weaned, patient will need to be notified of dose and time changes for insulin pump adjustments. Recommendations   1. Patient awake and able to independently utilize insulin pump at PTA insulin settings at this time. Patient told to notify RN of ALL insulin boluses. 2. Agree for patient to bolus 25 units with each dose of 40 mg Solumedrol. Added order in EMR: patient to administer 25 units humalog off insulin pump for each 40 mg solumedrol dose.  Please notify patient of steroid dose changes    For each dose of methylprednisolone Dosing, patient to give additional insulin bolus off pump:  8mg-15mg  of Methylprednisolone= 6 units Humalog  16-23mg  of Methylprednisolone= 13 units Humalog  24mg-35mg  of Methylprednisolone= 19 units Humalog  36+ of Methylprednisolone  Dose= 25 units Humalog      3. POC glucose ACHS. 4. Patient to bolus off the pump for all carbohydrate intake    5. Patient to enter blood glucose into pump to deliver additional correctional insulin ACHS. 6. RN to document all insulin doses given to patient off insulin pump in EMR (under LDA flow sheet)        If insulin pump needs to be removed (with current steroid order- solumedrol 40mg Q6) back-up settings are:  Basal: 75 units NPH Q12  Bolus: 1 unit Humalog for every 3 grams Carbs  Correctional: 1 unit Humalog for every 10 points over 200  Billing Code(s)   [x] 89636       Before making these care recommendations, I personally reviewed the hosptialization record, including laboratory and diagnostic data, medications and examined the patient at bedside (circumstances permitting).   Total minutes: 28    JOSÉ MIGUEL Harris  Diabetes Clinical Nurse Specialist  [facility]  Access via [employer]

## 2021-04-16 NOTE — PROGRESS NOTES
[facility] Adult  Hospitalist Group                                                                                          Hospitalist Progress Note  Carrie Yancey MD  Answering service: [phone] from in house phone        Date of Service:  2021  NAME:  Shellie Cano Sr.  :  1975  MRN:  [id number]      Admission Summary:   Shellie Cano Sr. is a 39 y.o. male with PMH of DM 2 on insulin pump, HTN, HLD, SHALOM on CPAP at home nightly, came to ED for evaluation of diarrhea going on for 5 days. Watery stools, 3-5 BMs per day, not able to keep anything inside, without any associated nausea/ vomiting/ fever/ chills/ abdominal pain. Patient denied any sore throat, cough, congestion, dizziness, lightheadedness, loss of appetite, muscle ache, body ache, fatigue, tiredness, constipation or urinary trouble. In the ED, patient was evaluated with COVID-19 rapid testing which turned positive. Hospitalist team asked to admit the patient for further evaluation. Interval history / Subjective: Follow up COVID-19 pneumonia, diarrhea. Patient seen and examined at the bedside. Labs, images and notes reviewed  Discussed with nursing staff, orders reviewed. Plan discussed with patient/Family  Respiratory distress in morning. But later in the day when evaluated, feels fine. Denied cough. No fevers in last 24hrs. No other overnight events     Assessment & Plan:     # Diarrhea with acute viral syndrome, COVID-19 +. Diarrhea better  # Bilateral pneumonia due to COVID-19 virus. Cough with deep breathing. High-grade fever noted first time on . Worsening  # Acute hypoxic respiratory failure due to above, progressively worsening, O2 requirement upto 6LPM with some respiratory distress in morning  # Hyponatremia, related to dehydration. 126 on admission . Improving  # Mild MARYCARMEN. Baseline creatinine around 1.1, and 1.41 on admission on .   Improved to 1.16 on  with gentle IVF 1L. # Abnormal UA with hematuria, proteinuria and glycosuria  # DM2 with hyperglycemia, on insulin pump with Humalog. Significant insulin resistant. Poorly controlled due to steroids, with likelihood of further worsening  # Mildly up trended transaminases, likely viral syndrome related  # HTN  # HLD  # SHALOM on CPAP at night. 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 Responding well to gentle diuresis  -CXR 4/14 done. Stable bilateral lower lobe infiltrates  -S/p IV Actemra x 1 on 4/14  -Not a candidate for IV remdesivir due to elevated LFTs, borderline time duration of symptom onset, almost close to 7-day on 4/14 when started getting hypoxic and needing O2 supplementation  -Monitor CBC, CMP and inflammatory markers as noted below  -IV Lasix 20 mg once with caution on 4/14 well tolerated, BMP stable.  -Another day of IV Lasix 20 mg 4/15   -O2 supplementation as needed to keep sats >90%  -Droplet plus isolation precautions  -Inflammatory markers, procalcitonin, D-dimer, CRP, ferritin, LDH  -IV Decadron 6 mg daily for 10 days. Pulmonology changed to high-dose IV Solu-Medrol 4/13  -Inflammatory markers improving, feeling well but clinically deteriorated hypoxia  -D/w Pulm about cPlasma. Would monitor. Does not seem as a potential option due to timing in the course of illness  -Accuchecks better with help of NPH addition by DM Mx team while on high dose steroid  -Monitor Accu-Cheks closely with aggressive measures to control hyperglycemia better with additional insulin as appropriate while on steroids  -O2 supplementation as needed  -IV Rocephin + azithromycin.  -Procalcitonin 0.1 on 4/13  -Pulmonology consultation. Appreciate recommendations  -Insulin pump management per diabetes education team  -DM education team on board. Managing insulin pump.   Battery and supplies received from home today 4/13  -CPAP at night per RT  -Stool enteric pathogen eval.     Emergency contact: Spouse     Mild deterioration of clinical presentation with hypoxia. Monitor closely. High risk for rapid decompensation. Code status: Full code  DVT prophylaxis: Lovenox SQ per COVID-19 protocol    Care Plan discussed with: Patient/Family and Nurse  Anticipated Disposition: Home w/Family  Anticipated Discharge: Greater than 48 hours     Hospital Problems  Date Reviewed: 11/19/2020          Codes Class Noted POA    Hyponatremia ICD-10-CM: E87.1  ICD-9-CM: 276.1  4/12/2021 Unknown        Acute respiratory failure with hypoxemia ([facility] [address] ICD-10-CM: J96.01  ICD-9-CM: 518.81  4/12/2021 Unknown        Pneumonia due to COVID-19 virus ICD-10-CM: U07.1, J12.82  ICD-9-CM: 480.8, 079.89  4/12/2021 Unknown                Review of Systems:   A comprehensive review of systems was negative except for that written in the HPI. Vital Signs:    Last 24hrs VS reviewed since prior progress note. Most recent are:  Visit Vitals  BP (!) 108/50 (BP 1 Location: Right upper arm, BP Patient Position: At rest)   Pulse 79   Temp 97.9 °F (36.6 °C)   Resp 23   Ht 5' 10\" (1.778 m)   Wt 152.2 kg (335 lb 8.6 oz)   SpO2 99%   BMI 48.14 kg/m²       No intake or output data in the 24 hours ending 04/16/21 0046     Physical Examination:     I had a face to face encounter with this patient and independently examined them on 4/16/2021 as outlined below:          General:          Alert, cooperative, Respiratory distress in morning, appears stated age. Morbid obesity. On 2 LPM O2 NC , on 6LPM O2 NC  HEENT:           Atraumatic, anicteric sclerae, pink conjunctivae                          No oral ulcers, mucosa moist, throat clear, dentition fair  Neck:               Supple, symmetrical  Lungs:             No basal crackles but reducing AE bibasilar. Better O2 sats upto 93% while efforts of ausculation and deep breathing.   No wheezing/rhonchi/rales  Chest wall:      No tenderness  No Accessory muscle use. Heart:              Regular  rhythm,  No  murmur   No edema  Abdomen:        Soft, non-tender. Not distended. Bowel sounds normal  Extremities:     No cyanosis. No clubbing,                            Skin turgor normal, Capillary refill normal  Skin:                Not pale. Not Jaundiced  No rashes   Psych:             Not anxious or agitated. Neurologic:      Alert, moves all extremities, answers questions appropriately and responds to commands          Data Review:    Review and/or order of clinical lab test  Review and/or order of tests in the radiology section of CPT  Review and/or order of tests in the medicine section of CPT    Xr Chest Pa Lat    Result Date: 4/12/2021  Mild bilateral perihilar interstitial infiltrates are concerning for atypical viral pneumonia. Ct Abd Pelv Wo Cont    Result Date: 4/12/2021  1. No evidence of acute process in the abdomen or pelvis. No urolithiasis. 2. Hepatic steatosis. 3. Findings consistent with COVID pneumonia throughout the bilateral lower lungs. Xr Chest Port    Result Date: 4/14/2021  Stable bilateral lower lobe infiltrates      Labs:     Recent Labs     04/15/21  0724 04/14/21  0501   WBC 8.7 5.9   HGB 12.8 12.6   HCT 37.6 37.8    180     Recent Labs     04/15/21  0724 04/14/21  0501 04/13/21  0715   * 132* 130*   K 3.5 4.0 3.6    98 98   CO2 25 22 21   BUN 25* 26* 28*   CREA 0.87 1.13 1.16   * 305* 178*   CA 8.9 9.1 8.8     Recent Labs     04/15/21  0724 04/14/21  0501 04/13/21  0715   * 192* 155*   AP 40* 42* 40*   TBILI 0.5 0.7 0.8   TP 7.8 8.1 8.1   ALB 2.9* 3.2* 3.2*   GLOB 4.9* 4.9* 4.9*     Recent Labs     04/13/21  0715   INR 1.1   PTP 11.5*   APTT 29.3      Recent Labs     04/14/21  0501 04/13/21  1708   FERR 1,265* 1,304*      No results found for: FOL, RBCF   No results for input(s): PH, PCO2, PO2 in the last 72 hours.   Recent Labs     04/13/21  0715 04/13/21  0405   TROIQ <0.05 <0.05     Lab Results   Component Value Date/Time    Cholesterol, total 76 10/15/2020 09:26 AM    HDL Cholesterol 30 10/15/2020 09:26 AM    LDL, calculated 23 10/15/2020 09:26 AM    Triglyceride 115 10/15/2020 09:26 AM    CHOL/HDL Ratio 2.5 10/15/2020 09:26 AM     Lab Results   Component Value Date/Time    Glucose (POC) 136 (H) 04/16/2021 12:00 AM    Glucose (POC) 191 (H) 04/15/2021 09:09 PM    Glucose (POC) 180 (H) 04/15/2021 05:31 PM    Glucose (POC) 119 (H) 04/15/2021 11:37 AM    Glucose (POC) 106 (H) 04/15/2021 08:29 AM     Lab Results   Component Value Date/Time    Color YELLOW/STRAW 04/12/2021 11:16 AM    Appearance CLEAR 04/12/2021 11:16 AM    Specific gravity 1.029 04/12/2021 11:16 AM    pH (UA) 5.5 04/12/2021 11:16 AM    Protein 100 (A) 04/12/2021 11:16 AM    Glucose >1,000 (A) 04/12/2021 11:16 AM    Ketone 80 (A) 04/12/2021 11:16 AM    Bilirubin Negative 04/12/2021 11:16 AM    Urobilinogen 0.2 04/12/2021 11:16 AM    Nitrites Negative 04/12/2021 11:16 AM    Leukocyte Esterase Negative 04/12/2021 11:16 AM    Epithelial cells FEW 04/12/2021 11:16 AM    Bacteria 4+ (A) 04/12/2021 11:16 AM    WBC 5-10 04/12/2021 11:16 AM    RBC 0-5 04/12/2021 11:16 AM         Medications Reviewed:     Current Facility-Administered Medications   Medication Dose Route Frequency    sodium chloride (OCEAN) 0.65 % nasal squeeze bottle 2 Spray  2 Spray Both Nostrils Q2H PRN    fluticasone propionate (FLONASE) 50 mcg/actuation nasal spray 2 Spray  2 Spray Both Nostrils QHS    methylPREDNISolone (PF) (SOLU-MEDROL) injection 40 mg  40 mg IntraVENous Q6H    insulin pump (PATIENT SUPPLIED)   SubCUTAneous PRN    sodium chloride (NS) flush 5-10 mL  5-10 mL IntraVENous PRN    sodium chloride (NS) flush 5-40 mL  5-40 mL IntraVENous Q8H    sodium chloride (NS) flush 5-40 mL  5-40 mL IntraVENous PRN    acetaminophen (TYLENOL) tablet 650 mg  650 mg Oral Q6H PRN    Or    acetaminophen (TYLENOL) suppository 650 mg  650 mg Rectal Q6H PRN    polyethylene glycol (MIRALAX) packet 17 g  17 g Oral DAILY PRN    promethazine (PHENERGAN) tablet 12.5 mg  12.5 mg Oral Q6H PRN    Or    ondansetron (ZOFRAN) injection 4 mg  4 mg IntraVENous Q6H PRN    cholecalciferol (VITAMIN D3) (1000 Units /25 mcg) tablet 2,000 Units  2,000 Units Oral DAILY    guaiFENesin-dextromethorphan (ROBITUSSIN DM) 100-10 mg/5 mL syrup 5 mL  5 mL Oral Q4H PRN    benzonatate (TESSALON) capsule 200 mg  200 mg Oral TID PRN    therapeutic multivitamin (THERAGRAN) tablet 1 Tab  1 Tab Oral DAILY    enoxaparin (LOVENOX) injection 40 mg  40 mg SubCUTAneous Q12H    glucose chewable tablet 16 g  4 Tab Oral PRN    dextrose (D50W) injection syrg 12.5-25 g  25-50 mL IntraVENous PRN    glucagon (GLUCAGEN) injection 1 mg  1 mg IntraMUSCular PRN     ______________________________________________________________________  EXPECTED LENGTH OF STAY: 4d 19h  ACTUAL LENGTH OF STAY:          4                 Xiomy Tyson MD

## 2021-04-16 NOTE — PROGRESS NOTES
0744- Pt O2 maintained 85-88%. RT notified. MD notified and Midflow ordered. RT notified. Verbal shift change report given to [address] [place] [address] (oncoming nurse) by [address], RN (offgoing nurse). Report included the following information SBAR, Kardex, ED Summary, Procedure Summary, Intake/Output, MAR, Accordion, Recent Results, Med Rec Status and Cardiac Rhythm NSR       Patient Vitals for the past 12 hrs:   Temp Pulse Resp BP SpO2   04/16/21 0453 98.1 °F (36.7 °C) 95 24 -- 99 %   04/16/21 0008 97.9 °F (36.6 °C) 79 23 (!) 108/50 99 %       .

## 2021-04-16 NOTE — PROGRESS NOTES
[facility] [facility] Adult  Hospitalist Group                                                                                          Hospitalist Progress Note  Lisette Daly MD  Answering service: [phone] from in house phone        Date of Service:  2021  NAME:  Kaci Aguilar Sr.  :  1975  MRN:  [id number]      Admission Summary:   Kaci Aguilar Sr. is a 39 y.o. male with PMH of DM 2 on insulin pump, HTN, HLD, SHALOM on CPAP at home nightly, came to ED for evaluation of diarrhea going on for 5 days. Watery stools, 3-5 BMs per day, not able to keep anything inside, without any associated nausea/ vomiting/ fever/ chills/ abdominal pain. Patient denied any sore throat, cough, congestion, dizziness, lightheadedness, loss of appetite, muscle ache, body ache, fatigue, tiredness, constipation or urinary trouble. In the ED, patient was evaluated with COVID-19 rapid testing which turned positive. Hospitalist team asked to admit the patient for further evaluation. Interval history / Subjective: Follow up COVID-19 pneumonia, diarrhea. Patient seen and examined at the bedside. Labs, images and notes reviewed  Discussed with nursing staff, orders reviewed. Plan discussed with patient/Family    He is sitting in chair, denied any worsening sob or cough. explained increase in oxygen requirement , now on midflow 10l. He c/o nasal congestion.    Discussed with nursing     Tried to reach wife, left voicemail      Assessment & Plan:     # Acute hypoxic respiratory failure - progressively worsening  # COVID-19 virus  # Diarrhea   # Bilateral pneumonia   -S/p IV Actemra x 1 on   -Not a candidate for Remdesivir due to elevated LFTs, borderline time duration of symptom onset, almost close to 7-day on  when started getting hypoxic   -trend Inflammatory markers  -D/w Pulm about cPlasma - Does not seem as a potential option due to timing in the course of illness  -appreciate Pulmonology input  - changed to high-dose IV Solu-Medrol 4/13 - weaned down to q8h on 4/16  - CXR 4/16: Mild patchy interstitial and airspace opacities with improvement  - saturating on midflow 10lNC  - added albuterol MDI. C/w IS and flonase bid   - trail of lasix again today     # Hyponatremia, related to dehydration - resolved   # Mild MARYCARMEN - Resolved   # Transaminases, likely viral syndrome related, will monitor     # DM2 with hyperglycemia, on insulin pump with Humalog.    - Significant insulin resistant.    -  Poorly controlled due to steroids  -DM education team on board. # HTN - restarted losartan. chlorthalidone on hold   # HLD- hold statin due to elevated LFTs  # SHALOM on CPAP at night. Tolerating well     High risk for rapid decompensation. Code status: Full code  DVT prophylaxis: Lovenox SQ per COVID-19 protocol    Care Plan discussed with: Patient/Family and Nurse  Anticipated Disposition: Home w/Family  Anticipated Discharge: Greater than 48 hours     Hospital Problems  Date Reviewed: 11/19/2020          Codes Class Noted POA    Hyponatremia ICD-10-CM: E87.1  ICD-9-CM: 276.1  4/12/2021 Unknown        Acute respiratory failure with hypoxemia ([facility] [address] ICD-10-CM: J96.01  ICD-9-CM: 518.81  4/12/2021 Unknown        Pneumonia due to COVID-19 virus ICD-10-CM: U07.1, J12.82  ICD-9-CM: 480.8, 079.89  4/12/2021 Unknown                Review of Systems:   A comprehensive review of systems was negative except for that written in the HPI. Vital Signs:    Last 24hrs VS reviewed since prior progress note.  Most recent are:  Visit Vitals  /67 (BP 1 Location: Right upper arm, BP Patient Position: At rest)   Pulse 92   Temp 97.8 °F (36.6 °C)   Resp 20   Ht 5' 10\" (1.778 m)   Wt 158.7 kg (349 lb 13.9 oz)   SpO2 94%   BMI 50.20 kg/m²       No intake or output data in the 24 hours ending 04/16/21 1429     Physical Examination:     I had a face to face encounter with this patient and independently examined them on 4/16/2021 as outlined below:          General:          Alert, cooperative, Respiratory distress in morning, appears stated age. Morbid obesity. HEENT:           Atraumatic, anicteric sclerae, pink conjunctivae                          No oral ulcers, mucosa moist, throat clear, dentition fair  Neck:               Supple, symmetrical  Lungs:             No basal crackles but reducing AE bibasilar. No wheezing/rhonchi/rales  Chest wall:      No tenderness  No Accessory muscle use. Heart:              Regular  rhythm,  No  murmur   No edema  Abdomen:        Soft, non-tender. Not distended. Bowel sounds normal  Extremities:     No cyanosis. No clubbing,                            Skin turgor normal, Capillary refill normal  Skin:                Not pale. Not Jaundiced  No rashes   Psych:             Not anxious or agitated. Neurologic:      Alert, moves all extremities, answers questions appropriately and responds to commands          Data Review:    Review and/or order of clinical lab test  Review and/or order of tests in the radiology section of CPT  Review and/or order of tests in the medicine section of CPT    Xr Chest Pa Lat    Result Date: 4/12/2021  Mild bilateral perihilar interstitial infiltrates are concerning for atypical viral pneumonia. Ct Abd Pelv Wo Cont    Result Date: 4/12/2021  1. No evidence of acute process in the abdomen or pelvis. No urolithiasis. 2. Hepatic steatosis. 3. Findings consistent with COVID pneumonia throughout the bilateral lower lungs. Xr Chest Port    Result Date: 4/16/2021  Mild patchy interstitial and airspace opacities with improvement from the prior study.     Xr Chest Port    Result Date: 4/14/2021  Stable bilateral lower lobe infiltrates      Labs:     Recent Labs     04/16/21  0515 04/15/21  0724   WBC 11.5* 8.7   HGB 13.3 12.8   HCT 39.8 37.6    228     Recent Labs     04/16/21  0515 04/15/21  0724 04/14/21  0501    135* 132*   K 3.7 3.5 4.0  103 98   CO2 25 25 22   BUN 29* 25* 26*   CREA 0.97 0.87 1.13   * 118* 305*   CA 8.8 8.9 9.1     Recent Labs     04/16/21  0515 04/15/21  0724 04/14/21  0501   * 176* 192*   AP 47 40* 42*   TBILI 0.6 0.5 0.7   TP 7.8 7.8 8.1   ALB 3.0* 2.9* 3.2*   GLOB 4.8* 4.9* 4.9*     No results for input(s): INR, PTP, APTT, INREXT, INREXT in the last 72 hours. Recent Labs     04/14/21  0501 04/13/21  1708   FERR 1,265* 1,304*      No results found for: FOL, RBCF   No results for input(s): PH, PCO2, PO2 in the last 72 hours. No results for input(s): CPK, CKNDX, TROIQ in the last 72 hours.     No lab exists for component: CPKMB  Lab Results   Component Value Date/Time    Cholesterol, total 76 10/15/2020 09:26 AM    HDL Cholesterol 30 10/15/2020 09:26 AM    LDL, calculated 23 10/15/2020 09:26 AM    Triglyceride 115 10/15/2020 09:26 AM    CHOL/HDL Ratio 2.5 10/15/2020 09:26 AM     Lab Results   Component Value Date/Time    Glucose (POC) 115 (H) 04/16/2021 12:29 PM    Glucose (POC) 115 (H) 04/16/2021 07:12 AM    Glucose (POC) 115 (H) 04/16/2021 04:50 AM    Glucose (POC) 136 (H) 04/16/2021 12:00 AM    Glucose (POC) 191 (H) 04/15/2021 09:09 PM     Lab Results   Component Value Date/Time    Color YELLOW/STRAW 04/12/2021 11:16 AM    Appearance CLEAR 04/12/2021 11:16 AM    Specific gravity 1.029 04/12/2021 11:16 AM    pH (UA) 5.5 04/12/2021 11:16 AM    Protein 100 (A) 04/12/2021 11:16 AM    Glucose >1,000 (A) 04/12/2021 11:16 AM    Ketone 80 (A) 04/12/2021 11:16 AM    Bilirubin Negative 04/12/2021 11:16 AM    Urobilinogen 0.2 04/12/2021 11:16 AM    Nitrites Negative 04/12/2021 11:16 AM    Leukocyte Esterase Negative 04/12/2021 11:16 AM    Epithelial cells FEW 04/12/2021 11:16 AM    Bacteria 4+ (A) 04/12/2021 11:16 AM    WBC 5-10 04/12/2021 11:16 AM    RBC 0-5 04/12/2021 11:16 AM         Medications Reviewed:     Current Facility-Administered Medications   Medication Dose Route Frequency    methylPREDNISolone (PF) (SOLU-MEDROL) injection 40 mg  40 mg IntraVENous Q8H    sodium chloride (OCEAN) 0.65 % nasal squeeze bottle 2 Spray  2 Spray Both Nostrils Q2H PRN    fluticasone propionate (FLONASE) 50 mcg/actuation nasal spray 2 Spray  2 Spray Both Nostrils QHS    insulin pump (PATIENT SUPPLIED)   SubCUTAneous PRN    sodium chloride (NS) flush 5-10 mL  5-10 mL IntraVENous PRN    sodium chloride (NS) flush 5-40 mL  5-40 mL IntraVENous Q8H    sodium chloride (NS) flush 5-40 mL  5-40 mL IntraVENous PRN    acetaminophen (TYLENOL) tablet 650 mg  650 mg Oral Q6H PRN    Or    acetaminophen (TYLENOL) suppository 650 mg  650 mg Rectal Q6H PRN    polyethylene glycol (MIRALAX) packet 17 g  17 g Oral DAILY PRN    promethazine (PHENERGAN) tablet 12.5 mg  12.5 mg Oral Q6H PRN    Or    ondansetron (ZOFRAN) injection 4 mg  4 mg IntraVENous Q6H PRN    cholecalciferol (VITAMIN D3) (1000 Units /25 mcg) tablet 2,000 Units  2,000 Units Oral DAILY    guaiFENesin-dextromethorphan (ROBITUSSIN DM) 100-10 mg/5 mL syrup 5 mL  5 mL Oral Q4H PRN    benzonatate (TESSALON) capsule 200 mg  200 mg Oral TID PRN    therapeutic multivitamin (THERAGRAN) tablet 1 Tab  1 Tab Oral DAILY    enoxaparin (LOVENOX) injection 40 mg  40 mg SubCUTAneous Q12H    glucose chewable tablet 16 g  4 Tab Oral PRN    dextrose (D50W) injection syrg 12.5-25 g  25-50 mL IntraVENous PRN    glucagon (GLUCAGEN) injection 1 mg  1 mg IntraMUSCular PRN     ______________________________________________________________________  EXPECTED LENGTH OF STAY: 4d 19h  ACTUAL LENGTH OF STAY:          4                 Henrry Abreu MD

## 2021-04-16 NOTE — PROGRESS NOTES
Transition of Care Plan   RUR- Low  13%   DISPOSITION: Return home when medically stable   F/U with PCP/Specialist     Transport: kristi WALLS reviewed patient chart for. Patient still >48 hours until discharge. CM to monitor patient for HARPAL needs; may need home O2. YARITZA Serrano.  Supervisee

## 2021-04-16 NOTE — PROGRESS NOTES
Bedside and Verbal shift change report given to [address] [place] [address] (oncoming nurse) by Zuleyma Arceo RN (offgoing nurse). Report included the following information SBAR, Kardex, MAR and Recent Results.

## 2021-04-17 LAB
ALBUMIN SERPL-MCNC: 2.9 G/DL (ref 3.5–5)
ALBUMIN/GLOB SERPL: 0.6 {RATIO} (ref 1.1–2.2)
ALP SERPL-CCNC: 52 U/L (ref 45–117)
ALT SERPL-CCNC: 184 U/L (ref 12–78)
ANION GAP SERPL CALC-SCNC: 10 MMOL/L (ref 5–15)
AST SERPL-CCNC: 58 U/L (ref 15–37)
BACTERIA SPEC CULT: NORMAL
BACTERIA SPEC CULT: NORMAL
BASOPHILS # BLD: 0 K/UL (ref 0–0.1)
BASOPHILS NFR BLD: 0 % (ref 0–1)
BILIRUB SERPL-MCNC: 0.6 MG/DL (ref 0.2–1)
BNP SERPL-MCNC: 39 PG/ML
BUN SERPL-MCNC: 27 MG/DL (ref 6–20)
BUN/CREAT SERPL: 30 (ref 12–20)
CALCIUM SERPL-MCNC: 8.5 MG/DL (ref 8.5–10.1)
CHLORIDE SERPL-SCNC: 103 MMOL/L (ref 97–108)
CO2 SERPL-SCNC: 22 MMOL/L (ref 21–32)
COMMENT, HOLDF: NORMAL
CREAT SERPL-MCNC: 0.89 MG/DL (ref 0.7–1.3)
CRP SERPL-MCNC: 1.85 MG/DL (ref 0–0.6)
D DIMER PPP FEU-MCNC: 0.7 MG/L FEU (ref 0–0.65)
DIFFERENTIAL METHOD BLD: ABNORMAL
EOSINOPHIL # BLD: 0 K/UL (ref 0–0.4)
EOSINOPHIL NFR BLD: 0 % (ref 0–7)
ERYTHROCYTE [DISTWIDTH] IN BLOOD BY AUTOMATED COUNT: 12.1 % (ref 11.5–14.5)
GLOBULIN SER CALC-MCNC: 4.5 G/DL (ref 2–4)
GLUCOSE BLD STRIP.AUTO-MCNC: 156 MG/DL (ref 65–100)
GLUCOSE BLD STRIP.AUTO-MCNC: 187 MG/DL (ref 65–100)
GLUCOSE BLD STRIP.AUTO-MCNC: 210 MG/DL (ref 65–100)
GLUCOSE BLD STRIP.AUTO-MCNC: 220 MG/DL (ref 65–100)
GLUCOSE BLD STRIP.AUTO-MCNC: 230 MG/DL (ref 65–100)
GLUCOSE BLD STRIP.AUTO-MCNC: 256 MG/DL (ref 65–100)
GLUCOSE BLD STRIP.AUTO-MCNC: 290 MG/DL (ref 65–100)
GLUCOSE SERPL-MCNC: 223 MG/DL (ref 65–100)
HCT VFR BLD AUTO: 38.1 % (ref 36.6–50.3)
HGB BLD-MCNC: 12.8 G/DL (ref 12.1–17)
IMM GRANULOCYTES # BLD AUTO: 0.1 K/UL (ref 0–0.04)
IMM GRANULOCYTES NFR BLD AUTO: 1 % (ref 0–0.5)
LYMPHOCYTES # BLD: 0.7 K/UL (ref 0.8–3.5)
LYMPHOCYTES NFR BLD: 7 % (ref 12–49)
MCH RBC QN AUTO: 27.8 PG (ref 26–34)
MCHC RBC AUTO-ENTMCNC: 33.6 G/DL (ref 30–36.5)
MCV RBC AUTO: 82.6 FL (ref 80–99)
MONOCYTES # BLD: 0.6 K/UL (ref 0–1)
MONOCYTES NFR BLD: 6 % (ref 5–13)
NEUTS SEG # BLD: 9.1 K/UL (ref 1.8–8)
NEUTS SEG NFR BLD: 86 % (ref 32–75)
NRBC # BLD: 0 K/UL (ref 0–0.01)
NRBC BLD-RTO: 0 PER 100 WBC
PLATELET # BLD AUTO: 298 K/UL (ref 150–400)
PMV BLD AUTO: 9.9 FL (ref 8.9–12.9)
POTASSIUM SERPL-SCNC: 4 MMOL/L (ref 3.5–5.1)
PROT SERPL-MCNC: 7.4 G/DL (ref 6.4–8.2)
RBC # BLD AUTO: 4.61 M/UL (ref 4.1–5.7)
RBC MORPH BLD: ABNORMAL
SAMPLES BEING HELD,HOLD: NORMAL
SERVICE CMNT-IMP: ABNORMAL
SERVICE CMNT-IMP: NORMAL
SERVICE CMNT-IMP: NORMAL
SODIUM SERPL-SCNC: 135 MMOL/L (ref 136–145)
WBC # BLD AUTO: 10.5 K/UL (ref 4.1–11.1)

## 2021-04-17 PROCEDURE — 94640 AIRWAY INHALATION TREATMENT: CPT

## 2021-04-17 PROCEDURE — 83880 ASSAY OF NATRIURETIC PEPTIDE: CPT

## 2021-04-17 PROCEDURE — 94664 DEMO&/EVAL PT USE INHALER: CPT

## 2021-04-17 PROCEDURE — 65660000000 HC RM CCU STEPDOWN

## 2021-04-17 PROCEDURE — 74011250637 HC RX REV CODE- 250/637: Performed by: INTERNAL MEDICINE

## 2021-04-17 PROCEDURE — 86140 C-REACTIVE PROTEIN: CPT

## 2021-04-17 PROCEDURE — 85025 COMPLETE CBC W/AUTO DIFF WBC: CPT

## 2021-04-17 PROCEDURE — 80053 COMPREHEN METABOLIC PANEL: CPT

## 2021-04-17 PROCEDURE — 82962 GLUCOSE BLOOD TEST: CPT

## 2021-04-17 PROCEDURE — 74011250636 HC RX REV CODE- 250/636: Performed by: INTERNAL MEDICINE

## 2021-04-17 PROCEDURE — 85379 FIBRIN DEGRADATION QUANT: CPT

## 2021-04-17 PROCEDURE — 36415 COLL VENOUS BLD VENIPUNCTURE: CPT

## 2021-04-17 RX ADMIN — FLUTICASONE PROPIONATE 2 SPRAY: 50 SPRAY, METERED NASAL at 22:09

## 2021-04-17 RX ADMIN — ALBUTEROL SULFATE 2 PUFF: 90 AEROSOL, METERED RESPIRATORY (INHALATION) at 08:47

## 2021-04-17 RX ADMIN — ALBUTEROL SULFATE 2 PUFF: 90 AEROSOL, METERED RESPIRATORY (INHALATION) at 12:27

## 2021-04-17 RX ADMIN — METHYLPREDNISOLONE SODIUM SUCCINATE 40 MG: 40 INJECTION, POWDER, FOR SOLUTION INTRAMUSCULAR; INTRAVENOUS at 23:38

## 2021-04-17 RX ADMIN — ASPIRIN 81 MG: 81 TABLET, COATED ORAL at 10:07

## 2021-04-17 RX ADMIN — Medication 2000 UNITS: at 10:07

## 2021-04-17 RX ADMIN — ALBUTEROL SULFATE 2 PUFF: 90 AEROSOL, METERED RESPIRATORY (INHALATION) at 19:43

## 2021-04-17 RX ADMIN — ENOXAPARIN SODIUM 40 MG: 40 INJECTION SUBCUTANEOUS at 15:53

## 2021-04-17 RX ADMIN — THERA TABS 1 TABLET: TAB at 10:07

## 2021-04-17 RX ADMIN — METHYLPREDNISOLONE SODIUM SUCCINATE 40 MG: 40 INJECTION, POWDER, FOR SOLUTION INTRAMUSCULAR; INTRAVENOUS at 15:50

## 2021-04-17 RX ADMIN — Medication 10 ML: at 22:00

## 2021-04-17 RX ADMIN — LOSARTAN POTASSIUM 100 MG: 50 TABLET, FILM COATED ORAL at 10:07

## 2021-04-17 RX ADMIN — METHYLPREDNISOLONE SODIUM SUCCINATE 40 MG: 40 INJECTION, POWDER, FOR SOLUTION INTRAMUSCULAR; INTRAVENOUS at 00:04

## 2021-04-17 RX ADMIN — ALBUTEROL SULFATE 2 PUFF: 90 AEROSOL, METERED RESPIRATORY (INHALATION) at 15:36

## 2021-04-17 RX ADMIN — FLUTICASONE PROPIONATE 2 SPRAY: 50 SPRAY, METERED NASAL at 07:00

## 2021-04-17 RX ADMIN — ALBUTEROL SULFATE 2 PUFF: 90 AEROSOL, METERED RESPIRATORY (INHALATION) at 03:32

## 2021-04-17 RX ADMIN — ENOXAPARIN SODIUM 40 MG: 40 INJECTION SUBCUTANEOUS at 03:50

## 2021-04-17 RX ADMIN — Medication 10 ML: at 14:00

## 2021-04-17 RX ADMIN — METHYLPREDNISOLONE SODIUM SUCCINATE 40 MG: 40 INJECTION, POWDER, FOR SOLUTION INTRAMUSCULAR; INTRAVENOUS at 07:16

## 2021-04-17 RX ADMIN — Medication 10 ML: at 07:16

## 2021-04-17 NOTE — PROGRESS NOTES
[facility]  Hospitalist Group                                                                                          Hospitalist Progress Note  Marcia Vera MD  Answering service: [phone] OR [phone] from in house phone        Date of Service:  2021  NAME:  Haydee Apple Sr.  :  1975  MRN:  [id number]      Admission Summary:   Haydee Apple Sr. is a 39 y.o. male with PMH of DM 2 on insulin pump, HTN, HLD, SHALOM on CPAP at home nightly, came to ED for evaluation of diarrhea going on for 5 days. Watery stools, 3-5 BMs per day, not able to keep anything inside, without any associated nausea/ vomiting/ fever/ chills/ abdominal pain. Patient denied any sore throat, cough, congestion, dizziness, lightheadedness, loss of appetite, muscle ache, body ache, fatigue, tiredness, constipation or urinary trouble. In the ED, patient was evaluated with COVID-19 rapid testing which turned positive. Hospitalist team asked to admit the patient for further evaluation. Interval history / Subjective: Follow up COVID-19 pneumonia, diarrhea. Patient seen and examined at the bedside. Labs, images and notes reviewed  Discussed with nursing staff, orders reviewed. Plan discussed with patient/Family    Patient is sitting chair. O2 demands increased and now on 12L NC. He is otherwise doing well. Denies watery stool but admits to loose stool. No other complaints. All questions answered.         Assessment & Plan:     # Acute hypoxic respiratory failure - progressively worsening  # COVID-19 virus  # Diarrhea, improved, not problematic now  # Bilateral pneumonia   -S/p IV Actemra x 1 on   -Not a candidate for Remdesivir due to elevated LFTs, borderline time duration of symptom onset, almost close to 7-day on  when started getting hypoxic   -trend Inflammatory markers  -D/w Pulm about cPlasma - Does not seem as a potential option due to timing in the course of illness  -appreciate Pulmonology input  - changed to high-dose IV Solu-Medrol 4/13 - weaned down to q8h on 4/16  - CXR 4/16: Mild patchy interstitial and airspace opacities with improvement  - O2 demands increased slightly now on 12L O2  - trail of lasix again 4/16 but no I/O to determine efficacy  - continue ICMU care, albuterol MDI, Flonase  - inflammatory markers:  D-dimer increased, CRP improved. # DM2 with hyperglycemia, on insulin pump with Humalog.    - Significant insulin resistant.    -  Poorly controlled due to steroids  -DM education team on board and patient is managing BS with his pump    # HTN, controlled   - restarted losartan. chlorthalidone on hold     # Hyponatremia, related to dehydration - resolved   # Mild MARYCARMEN - Resolved   # Transaminases, likely viral syndrome related, will monitor     # HLD- hold statin due to elevated LFTs  # SHALOM on CPAP at night. Tolerating well     High risk for rapid decompensation. Code status: Full code  DVT prophylaxis: Lovenox SQ per COVID-19 protocol    Care Plan discussed with: patient  Anticipated Disposition: Home w/Family  Anticipated Discharge: Greater than 48 hours     Hospital Problems  Date Reviewed: 11/19/2020          Codes Class Noted POA    Hyponatremia ICD-10-CM: E87.1  ICD-9-CM: 276.1  4/12/2021 Unknown        Acute respiratory failure with hypoxemia ([facility] [address] ICD-10-CM: J96.01  ICD-9-CM: 518.81  4/12/2021 Unknown        Pneumonia due to COVID-19 virus ICD-10-CM: U07.1, J12.82  ICD-9-CM: 480.8, 079.89  4/12/2021 Unknown                Review of Systems:   A comprehensive review of systems was negative except for that written in the HPI. Vital Signs:    Last 24hrs VS reviewed since prior progress note.  Most recent are:  Visit Vitals  /68   Pulse 89   Temp 97.9 °F (36.6 °C)   Resp 22   Ht 5' 10\" (1.778 m)   Wt 158 kg (348 lb 5.2 oz)   SpO2 90%   BMI 49.98 kg/m²       No intake or output data in the 24 hours ending 04/17/21 1114     Physical Examination:     I had a face to face encounter with this patient and independently examined them on 4/17/2021 as outlined below:          General:          Alert, cooperative, No Respiratory distress in morning, appears stated age. Morbid obesity. Neck:               Supple, symmetrical  Lungs:             No basal crackles but reducing AE bibasilar. No wheezing/rhonchi/rales  Chest wall:      No tenderness  No Accessory muscle use. Heart:              Regular  rhythm,  No  murmur   No edema  Abdomen:        Soft, non-tender. Not distended. Bowel sounds normal  Extremities:     No cyanosis. No clubbing,  trace LE edema                          Skin turgor normal, Capillary refill normal  Skin:                Not pale. Not Jaundiced  No rashes   Psych:             Not anxious or agitated. Neurologic:      Alert, moves all extremities, answers questions appropriately and responds to commands          Data Review:    Review and/or order of clinical lab test  Review and/or order of tests in the radiology section of CPT  Review and/or order of tests in the medicine section of CPT    Xr Chest Pa Lat    Result Date: 4/12/2021  Mild bilateral perihilar interstitial infiltrates are concerning for atypical viral pneumonia. Ct Abd Pelv Wo Cont    Result Date: 4/12/2021  1. No evidence of acute process in the abdomen or pelvis. No urolithiasis. 2. Hepatic steatosis. 3. Findings consistent with COVID pneumonia throughout the bilateral lower lungs. Xr Chest Port    Result Date: 4/16/2021  Mild patchy interstitial and airspace opacities with improvement from the prior study.     Xr Chest Port    Result Date: 4/14/2021  Stable bilateral lower lobe infiltrates      Labs:     Recent Labs     04/17/21  0410 04/16/21  0515   WBC 10.5 11.5*   HGB 12.8 13.3   HCT 38.1 39.8    272     Recent Labs     04/17/21  0410 04/16/21  0515 04/15/21  0724   * 137 135*   K 4.0 3.7 3.5    104 103   CO2 22 25 25   BUN 27* 29* 25* CREA 0.89 0.97 0.87   * 103* 118*   CA 8.5 8.8 8.9     Recent Labs     04/17/21  0410 04/16/21  0515 04/15/21  0724   * 190* 176*   AP 52 47 40*   TBILI 0.6 0.6 0.5   TP 7.4 7.8 7.8   ALB 2.9* 3.0* 2.9*   GLOB 4.5* 4.8* 4.9*     No results for input(s): INR, PTP, APTT, INREXT, INREXT in the last 72 hours. No results for input(s): FE, TIBC, PSAT, FERR in the last 72 hours. No results found for: FOL, RBCF   No results for input(s): PH, PCO2, PO2 in the last 72 hours. No results for input(s): CPK, CKNDX, TROIQ in the last 72 hours.     No lab exists for component: CPKMB  Lab Results   Component Value Date/Time    Cholesterol, total 76 10/15/2020 09:26 AM    HDL Cholesterol 30 10/15/2020 09:26 AM    LDL, calculated 23 10/15/2020 09:26 AM    Triglyceride 115 10/15/2020 09:26 AM    CHOL/HDL Ratio 2.5 10/15/2020 09:26 AM     Lab Results   Component Value Date/Time    Glucose (POC) 156 (H) 04/17/2021 08:43 AM    Glucose (POC) 187 (H) 04/17/2021 07:14 AM    Glucose (POC) 220 (H) 04/17/2021 03:49 AM    Glucose (POC) 290 (H) 04/17/2021 12:07 AM    Glucose (POC) 190 (H) 04/16/2021 06:16 PM     Lab Results   Component Value Date/Time    Color YELLOW/STRAW 04/12/2021 11:16 AM    Appearance CLEAR 04/12/2021 11:16 AM    Specific gravity 1.029 04/12/2021 11:16 AM    pH (UA) 5.5 04/12/2021 11:16 AM    Protein 100 (A) 04/12/2021 11:16 AM    Glucose >1,000 (A) 04/12/2021 11:16 AM    Ketone 80 (A) 04/12/2021 11:16 AM    Bilirubin Negative 04/12/2021 11:16 AM    Urobilinogen 0.2 04/12/2021 11:16 AM    Nitrites Negative 04/12/2021 11:16 AM    Leukocyte Esterase Negative 04/12/2021 11:16 AM    Epithelial cells FEW 04/12/2021 11:16 AM    Bacteria 4+ (A) 04/12/2021 11:16 AM    WBC 5-10 04/12/2021 11:16 AM    RBC 0-5 04/12/2021 11:16 AM         Medications Reviewed:     Current Facility-Administered Medications   Medication Dose Route Frequency    methylPREDNISolone (PF) (SOLU-MEDROL) injection 40 mg  40 mg IntraVENous Q8H    albuterol (PROVENTIL HFA, VENTOLIN HFA, PROAIR HFA) inhaler 2 Puff  2 Puff Inhalation Q4H RT    fluticasone propionate (FLONASE) 50 mcg/actuation nasal spray 2 Spray  2 Spray Both Nostrils ACB/HS    sodium chloride (OCEAN) 0.65 % nasal squeeze bottle 2 Spray  2 Spray Both Nostrils Q2H PRN    aspirin delayed-release tablet 81 mg  81 mg Oral DAILY    losartan (COZAAR) tablet 100 mg  100 mg Oral DAILY    sodium chloride (OCEAN) 0.65 % nasal squeeze bottle 2 Spray  2 Spray Both Nostrils Q2H PRN    insulin pump (PATIENT SUPPLIED)   SubCUTAneous PRN    sodium chloride (NS) flush 5-10 mL  5-10 mL IntraVENous PRN    sodium chloride (NS) flush 5-40 mL  5-40 mL IntraVENous Q8H    sodium chloride (NS) flush 5-40 mL  5-40 mL IntraVENous PRN    acetaminophen (TYLENOL) tablet 650 mg  650 mg Oral Q6H PRN    Or    acetaminophen (TYLENOL) suppository 650 mg  650 mg Rectal Q6H PRN    polyethylene glycol (MIRALAX) packet 17 g  17 g Oral DAILY PRN    promethazine (PHENERGAN) tablet 12.5 mg  12.5 mg Oral Q6H PRN    Or    ondansetron (ZOFRAN) injection 4 mg  4 mg IntraVENous Q6H PRN    cholecalciferol (VITAMIN D3) (1000 Units /25 mcg) tablet 2,000 Units  2,000 Units Oral DAILY    guaiFENesin-dextromethorphan (ROBITUSSIN DM) 100-10 mg/5 mL syrup 5 mL  5 mL Oral Q4H PRN    benzonatate (TESSALON) capsule 200 mg  200 mg Oral TID PRN    therapeutic multivitamin (THERAGRAN) tablet 1 Tab  1 Tab Oral DAILY    enoxaparin (LOVENOX) injection 40 mg  40 mg SubCUTAneous Q12H    glucose chewable tablet 16 g  4 Tab Oral PRN    dextrose (D50W) injection syrg 12.5-25 g  25-50 mL IntraVENous PRN    glucagon (GLUCAGEN) injection 1 mg  1 mg IntraMUSCular PRN     ______________________________________________________________________  EXPECTED LENGTH OF STAY: 4d 19h  ACTUAL LENGTH OF STAY:          5                 Cody Mondragon MD

## 2021-04-17 NOTE — PROGRESS NOTES
Patient has his own insulin pump and self administers his own insulin    Morning administration: 25 units long acting    Noon administration: 0 units, waiting on new insulin insert from home, will arrive by afternoon

## 2021-04-17 NOTE — PROGRESS NOTES
Verbal shift change report given to Tanmay Luz (oncoming nurse) by Tony Mckeon RN (offgoing nurse). Report included the following information SBAR, Kardex, MAR and Recent Results.

## 2021-04-17 NOTE — PROGRESS NOTES
Verbal shift change report given to Brandon Albarado RN (oncoming nurse) by Surinder Navarro RN (offgoing nurse).  Report included the following information SBAR, Kardex, Procedure Summary, Intake/Output, MAR, Accordion, Recent Results, Med Rec Status and Cardiac Rhythm NSR       Patient Vitals for the past 12 hrs:   Temp Pulse Resp BP SpO2   04/17/21 0714 97.9 °F (36.6 °C) 82 24 134/64 100 %   04/17/21 0430 98.1 °F (36.7 °C) 75 21 -- 99 %   04/17/21 0332 -- -- -- -- 99 %   04/17/21 0013 97.6 °F (36.4 °C) 90 -- 121/66 94 %   04/16/21 2146 -- -- -- -- 90 %   04/16/21 2058 97.4 °F (36.3 °C) 88 22 130/74 93 %   04/16/21 2042 -- 84 -- -- 94 %       Last 3 Recorded Weights in this Encounter    04/15/21 0441 04/16/21 0453 04/17/21 0714   Weight: 152.2 kg (335 lb 8.6 oz) 158.7 kg (349 lb 13.9 oz) 158 kg (348 lb 5.2 oz)

## 2021-04-18 LAB
BNP SERPL-MCNC: 50 PG/ML
CRP SERPL-MCNC: 1.09 MG/DL (ref 0–0.6)
D DIMER PPP FEU-MCNC: 1.52 MG/L FEU (ref 0–0.65)
GLUCOSE BLD STRIP.AUTO-MCNC: 100 MG/DL (ref 65–100)
GLUCOSE BLD STRIP.AUTO-MCNC: 152 MG/DL (ref 65–100)
GLUCOSE BLD STRIP.AUTO-MCNC: 163 MG/DL (ref 65–100)
GLUCOSE BLD STRIP.AUTO-MCNC: 227 MG/DL (ref 65–100)
SERVICE CMNT-IMP: ABNORMAL
SERVICE CMNT-IMP: NORMAL

## 2021-04-18 PROCEDURE — 74011250636 HC RX REV CODE- 250/636: Performed by: INTERNAL MEDICINE

## 2021-04-18 PROCEDURE — 82962 GLUCOSE BLOOD TEST: CPT

## 2021-04-18 PROCEDURE — 94660 CPAP INITIATION&MGMT: CPT

## 2021-04-18 PROCEDURE — 94640 AIRWAY INHALATION TREATMENT: CPT

## 2021-04-18 PROCEDURE — 83880 ASSAY OF NATRIURETIC PEPTIDE: CPT

## 2021-04-18 PROCEDURE — 85379 FIBRIN DEGRADATION QUANT: CPT

## 2021-04-18 PROCEDURE — 65660000000 HC RM CCU STEPDOWN

## 2021-04-18 PROCEDURE — 86140 C-REACTIVE PROTEIN: CPT

## 2021-04-18 PROCEDURE — 77010033711 HC HIGH FLOW OXYGEN

## 2021-04-18 PROCEDURE — 94760 N-INVAS EAR/PLS OXIMETRY 1: CPT

## 2021-04-18 PROCEDURE — 36415 COLL VENOUS BLD VENIPUNCTURE: CPT

## 2021-04-18 PROCEDURE — 74011250637 HC RX REV CODE- 250/637: Performed by: INTERNAL MEDICINE

## 2021-04-18 RX ADMIN — LOSARTAN POTASSIUM 100 MG: 50 TABLET, FILM COATED ORAL at 09:11

## 2021-04-18 RX ADMIN — METHYLPREDNISOLONE SODIUM SUCCINATE 40 MG: 40 INJECTION, POWDER, FOR SOLUTION INTRAMUSCULAR; INTRAVENOUS at 16:33

## 2021-04-18 RX ADMIN — Medication 10 ML: at 22:36

## 2021-04-18 RX ADMIN — FLUTICASONE PROPIONATE 2 SPRAY: 50 SPRAY, METERED NASAL at 07:01

## 2021-04-18 RX ADMIN — ENOXAPARIN SODIUM 40 MG: 40 INJECTION SUBCUTANEOUS at 04:16

## 2021-04-18 RX ADMIN — ALBUTEROL SULFATE 2 PUFF: 90 AEROSOL, METERED RESPIRATORY (INHALATION) at 04:21

## 2021-04-18 RX ADMIN — ALBUTEROL SULFATE 2 PUFF: 90 AEROSOL, METERED RESPIRATORY (INHALATION) at 00:15

## 2021-04-18 RX ADMIN — THERA TABS 1 TABLET: TAB at 09:11

## 2021-04-18 RX ADMIN — METHYLPREDNISOLONE SODIUM SUCCINATE 40 MG: 40 INJECTION, POWDER, FOR SOLUTION INTRAMUSCULAR; INTRAVENOUS at 06:57

## 2021-04-18 RX ADMIN — ALBUTEROL SULFATE 2 PUFF: 90 AEROSOL, METERED RESPIRATORY (INHALATION) at 09:11

## 2021-04-18 RX ADMIN — METHYLPREDNISOLONE SODIUM SUCCINATE 40 MG: 40 INJECTION, POWDER, FOR SOLUTION INTRAMUSCULAR; INTRAVENOUS at 22:36

## 2021-04-18 RX ADMIN — ALBUTEROL SULFATE 2 PUFF: 90 AEROSOL, METERED RESPIRATORY (INHALATION) at 15:18

## 2021-04-18 RX ADMIN — Medication 10 ML: at 06:57

## 2021-04-18 RX ADMIN — ASPIRIN 81 MG: 81 TABLET, COATED ORAL at 09:11

## 2021-04-18 RX ADMIN — ALBUTEROL SULFATE 2 PUFF: 90 AEROSOL, METERED RESPIRATORY (INHALATION) at 12:06

## 2021-04-18 RX ADMIN — FLUTICASONE PROPIONATE 2 SPRAY: 50 SPRAY, METERED NASAL at 22:36

## 2021-04-18 RX ADMIN — Medication 2000 UNITS: at 09:11

## 2021-04-18 RX ADMIN — ALBUTEROL SULFATE 2 PUFF: 90 AEROSOL, METERED RESPIRATORY (INHALATION) at 22:26

## 2021-04-18 RX ADMIN — Medication 10 ML: at 14:00

## 2021-04-18 RX ADMIN — ENOXAPARIN SODIUM 40 MG: 40 INJECTION SUBCUTANEOUS at 16:33

## 2021-04-18 NOTE — PROGRESS NOTES
[facility] Adult  Hospitalist Group                                                                                          Hospitalist Progress Note  Adonis Paget, MD  Answering service: [phone] from in house phone        Date of Service:  2021  NAME:  Ja Stephens Sr.  :  1975  MRN:  [id number]      Admission Summary:   Ja Stephens Sr. is a 39 y.o. male with PMH of DM 2 on insulin pump, HTN, HLD, SHALOM on CPAP at home nightly, came to ED for evaluation of diarrhea going on for 5 days. Watery stools, 3-5 BMs per day, not able to keep anything inside, without any associated nausea/ vomiting/ fever/ chills/ abdominal pain. Patient denied any sore throat, cough, congestion, dizziness, lightheadedness, loss of appetite, muscle ache, body ache, fatigue, tiredness, constipation or urinary trouble. In the ED, patient was evaluated with COVID-19 rapid testing which turned positive. Hospitalist team asked to admit the patient for further evaluation. Interval history / Subjective: Follow up COVID-19 pneumonia, diarrhea. Patient seen and examined. He is doing well this morning. He is still in bed with bipap but states he had an uneventful night with no new complaints. It appears his O2 needs have remained stable at 12L O2 NC. No CP, N/V or significant loose stool.        Assessment & Plan:     # Acute hypoxic respiratory failure - progressively worsening  # COVID-19 virus  # Diarrhea, improved, not problematic now  # Bilateral pneumonia   -S/p IV Actemra x 1 on   -Not a candidate for Remdesivir due to elevated LFTs, borderline time duration of symptom onset, almost close to 7-day on  when started getting hypoxic   -trend Inflammatory markers  -D/w Pulm about cPlasma - Does not seem as a potential option due to timing in the course of illness  -appreciate Pulmonology input  - changed to high-dose IV Solu-Medrol  - remains on 40mg q8h since   - CXR 4/16: Mild patchy interstitial and airspace opacities with improvement  - O2 demands stable on 12L O2  - trail of lasix again 4/16 but no I/O to determine efficacy  - continue ICMU care, albuterol MDI, Flonase  - inflammatory markers:  D-dimer increasing, CRP improving. Continue to monitor. # DM2 with hyperglycemia, on insulin pump with Humalog.    - Significant insulin resistant. -DM education team on board and patient is managing BS with his pump   -control improved and this morning it is 152    # HTN, controlled   - restarted losartan. chlorthalidone on hold     # Hyponatremia, related to dehydration - resolved   # Mild MARYCARMEN - Resolved   # Transaminases, likely viral syndrome related, will monitor in am    # HLD- hold statin due to elevated LFTs  # SHALOM on CPAP at night. Tolerating well     High risk for rapid decompensation. Code status: Full code  DVT prophylaxis: Lovenox SQ per COVID-19 protocol    Care Plan discussed with: patient  Anticipated Disposition: Home w/Family  Anticipated Discharge: Greater than 48 hours     Hospital Problems  Date Reviewed: 11/19/2020          Codes Class Noted POA    Hyponatremia ICD-10-CM: E87.1  ICD-9-CM: 276.1  4/12/2021 Unknown        Acute respiratory failure with hypoxemia ([facility] [address] ICD-10-CM: J96.01  ICD-9-CM: 518.81  4/12/2021 Unknown        Pneumonia due to COVID-19 virus ICD-10-CM: U07.1, J12.82  ICD-9-CM: 480.8, 079.89  4/12/2021 Unknown                Review of Systems:   A comprehensive review of systems was negative except for that written in the HPI. Vital Signs:    Last 24hrs VS reviewed since prior progress note.  Most recent are:  Visit Vitals  /69   Pulse 90   Temp 97.9 °F (36.6 °C)   Resp 24   Ht 5' 10\" (1.778 m)   Wt 157 kg (346 lb 2 oz)   SpO2 91%   BMI 49.66 kg/m²       No intake or output data in the 24 hours ending 04/18/21 0804     Physical Examination:     I had a face to face encounter with this patient and independently examined them on 4/18/2021 as outlined below:          General:          Alert, cooperative, No Respiratory distress in morning, appears stated age. Morbid obesity. Lying in bed on bipap. Lungs:             No basal crackles but reducing AE bibasilar. No wheezing/rhonchi/rales, good AE bilaterally. Chest wall:      No tenderness  No Accessory muscle use. Heart:              Regular  rhythm,  No  murmur   No edema  Abdomen:        Soft, non-tender. Not distended. Bowel sounds normal  Extremities:     No cyanosis. No clubbing,  trace LE edema                          Skin turgor normal, Capillary refill normal  Psych:             Not anxious or agitated. Neurologic:      Alert, moves all extremities, answers questions appropriately and responds to commands          Data Review:        Xr Chest Pa Lat    Result Date: 4/12/2021  Mild bilateral perihilar interstitial infiltrates are concerning for atypical viral pneumonia. Ct Abd Pelv Wo Cont    Result Date: 4/12/2021  1. No evidence of acute process in the abdomen or pelvis. No urolithiasis. 2. Hepatic steatosis. 3. Findings consistent with COVID pneumonia throughout the bilateral lower lungs. Xr Chest Port    Result Date: 4/16/2021  Mild patchy interstitial and airspace opacities with improvement from the prior study. Xr Chest Port    Result Date: 4/14/2021  Stable bilateral lower lobe infiltrates      Labs:     Recent Labs     04/17/21  0410 04/16/21  0515   WBC 10.5 11.5*   HGB 12.8 13.3   HCT 38.1 39.8    272     Recent Labs     04/17/21  0410 04/16/21  0515   * 137   K 4.0 3.7    104   CO2 22 25   BUN 27* 29*   CREA 0.89 0.97   * 103*   CA 8.5 8.8     Recent Labs     04/17/21  0410 04/16/21  0515   * 190*   AP 52 47   TBILI 0.6 0.6   TP 7.4 7.8   ALB 2.9* 3.0*   GLOB 4.5* 4.8*     No results for input(s): INR, PTP, APTT, INREXT, INREXT in the last 72 hours.    No results for input(s): FE, TIBC, PSAT, FERR in the last 72 hours. No results found for: FOL, RBCF   No results for input(s): PH, PCO2, PO2 in the last 72 hours. No results for input(s): CPK, CKNDX, TROIQ in the last 72 hours.     No lab exists for component: CPKMB  Lab Results   Component Value Date/Time    Cholesterol, total 76 10/15/2020 09:26 AM    HDL Cholesterol 30 10/15/2020 09:26 AM    LDL, calculated 23 10/15/2020 09:26 AM    Triglyceride 115 10/15/2020 09:26 AM    CHOL/HDL Ratio 2.5 10/15/2020 09:26 AM     Lab Results   Component Value Date/Time    Glucose (POC) 152 (H) 04/18/2021 06:56 AM    Glucose (POC) 163 (H) 04/18/2021 04:18 AM    Glucose (POC) 256 (H) 04/17/2021 10:08 PM    Glucose (POC) 210 (H) 04/17/2021 03:48 PM    Glucose (POC) 230 (H) 04/17/2021 12:15 PM     Lab Results   Component Value Date/Time    Color YELLOW/STRAW 04/12/2021 11:16 AM    Appearance CLEAR 04/12/2021 11:16 AM    Specific gravity 1.029 04/12/2021 11:16 AM    pH (UA) 5.5 04/12/2021 11:16 AM    Protein 100 (A) 04/12/2021 11:16 AM    Glucose >1,000 (A) 04/12/2021 11:16 AM    Ketone 80 (A) 04/12/2021 11:16 AM    Bilirubin Negative 04/12/2021 11:16 AM    Urobilinogen 0.2 04/12/2021 11:16 AM    Nitrites Negative 04/12/2021 11:16 AM    Leukocyte Esterase Negative 04/12/2021 11:16 AM    Epithelial cells FEW 04/12/2021 11:16 AM    Bacteria 4+ (A) 04/12/2021 11:16 AM    WBC 5-10 04/12/2021 11:16 AM    RBC 0-5 04/12/2021 11:16 AM         Medications Reviewed:     Current Facility-Administered Medications   Medication Dose Route Frequency    methylPREDNISolone (PF) (SOLU-MEDROL) injection 40 mg  40 mg IntraVENous Q8H    albuterol (PROVENTIL HFA, VENTOLIN HFA, PROAIR HFA) inhaler 2 Puff  2 Puff Inhalation Q4H RT    fluticasone propionate (FLONASE) 50 mcg/actuation nasal spray 2 Spray  2 Spray Both Nostrils ACB/HS    sodium chloride (OCEAN) 0.65 % nasal squeeze bottle 2 Spray  2 Spray Both Nostrils Q2H PRN    aspirin delayed-release tablet 81 mg  81 mg Oral DAILY    losartan (COZAAR) tablet 100 mg  100 mg Oral DAILY    sodium chloride (OCEAN) 0.65 % nasal squeeze bottle 2 Spray  2 Spray Both Nostrils Q2H PRN    insulin pump (PATIENT SUPPLIED)   SubCUTAneous PRN    sodium chloride (NS) flush 5-10 mL  5-10 mL IntraVENous PRN    sodium chloride (NS) flush 5-40 mL  5-40 mL IntraVENous Q8H    sodium chloride (NS) flush 5-40 mL  5-40 mL IntraVENous PRN    acetaminophen (TYLENOL) tablet 650 mg  650 mg Oral Q6H PRN    Or    acetaminophen (TYLENOL) suppository 650 mg  650 mg Rectal Q6H PRN    polyethylene glycol (MIRALAX) packet 17 g  17 g Oral DAILY PRN    promethazine (PHENERGAN) tablet 12.5 mg  12.5 mg Oral Q6H PRN    Or    ondansetron (ZOFRAN) injection 4 mg  4 mg IntraVENous Q6H PRN    cholecalciferol (VITAMIN D3) (1000 Units /25 mcg) tablet 2,000 Units  2,000 Units Oral DAILY    guaiFENesin-dextromethorphan (ROBITUSSIN DM) 100-10 mg/5 mL syrup 5 mL  5 mL Oral Q4H PRN    benzonatate (TESSALON) capsule 200 mg  200 mg Oral TID PRN    therapeutic multivitamin (THERAGRAN) tablet 1 Tab  1 Tab Oral DAILY    enoxaparin (LOVENOX) injection 40 mg  40 mg SubCUTAneous Q12H    glucose chewable tablet 16 g  4 Tab Oral PRN    dextrose (D50W) injection syrg 12.5-25 g  25-50 mL IntraVENous PRN    glucagon (GLUCAGEN) injection 1 mg  1 mg IntraMUSCular PRN     ______________________________________________________________________  EXPECTED LENGTH OF STAY: 4d 19h  ACTUAL LENGTH OF STAY:          6                 Freddie Maurer MD

## 2021-04-18 NOTE — PROGRESS NOTES
Verbal shift change report given to Sanjay Hoover RN  (oncoming nurse) by Joyce Russell RN (offgoing nurse). Report included the following information SBAR, Kardex, ED Summary, Procedure Summary, Intake/Output, MAR, Accordion, Recent Results and Cardiac Rhythm NSR     Patient Vitals for the past 12 hrs:   Temp Pulse Resp BP SpO2   04/18/21 0701 97.9 °F (36.6 °C) 90 24 124/69 91 %   04/18/21 0421 -- -- -- -- 99 %   04/18/21 0015 -- -- -- -- 96 %   .

## 2021-04-19 LAB
ALBUMIN SERPL-MCNC: 2.8 G/DL (ref 3.5–5)
ALBUMIN/GLOB SERPL: 0.7 {RATIO} (ref 1.1–2.2)
ALP SERPL-CCNC: 44 U/L (ref 45–117)
ALT SERPL-CCNC: 168 U/L (ref 12–78)
ANION GAP SERPL CALC-SCNC: 8 MMOL/L (ref 5–15)
AST SERPL-CCNC: 44 U/L (ref 15–37)
BASOPHILS # BLD: 0 K/UL (ref 0–0.1)
BASOPHILS NFR BLD: 0 % (ref 0–1)
BILIRUB SERPL-MCNC: 0.6 MG/DL (ref 0.2–1)
BNP SERPL-MCNC: 31 PG/ML
BUN SERPL-MCNC: 28 MG/DL (ref 6–20)
BUN/CREAT SERPL: 29 (ref 12–20)
CALCIUM SERPL-MCNC: 8.7 MG/DL (ref 8.5–10.1)
CHLORIDE SERPL-SCNC: 105 MMOL/L (ref 97–108)
CO2 SERPL-SCNC: 26 MMOL/L (ref 21–32)
CREAT SERPL-MCNC: 0.95 MG/DL (ref 0.7–1.3)
CRP SERPL-MCNC: 0.66 MG/DL (ref 0–0.6)
D DIMER PPP FEU-MCNC: 1.64 MG/L FEU (ref 0–0.65)
DIFFERENTIAL METHOD BLD: ABNORMAL
EOSINOPHIL # BLD: 0 K/UL (ref 0–0.4)
EOSINOPHIL NFR BLD: 0 % (ref 0–7)
ERYTHROCYTE [DISTWIDTH] IN BLOOD BY AUTOMATED COUNT: 12.3 % (ref 11.5–14.5)
GLOBULIN SER CALC-MCNC: 4.3 G/DL (ref 2–4)
GLUCOSE BLD STRIP.AUTO-MCNC: 100 MG/DL (ref 65–100)
GLUCOSE BLD STRIP.AUTO-MCNC: 116 MG/DL (ref 65–100)
GLUCOSE BLD STRIP.AUTO-MCNC: 152 MG/DL (ref 65–100)
GLUCOSE BLD STRIP.AUTO-MCNC: 180 MG/DL (ref 65–100)
GLUCOSE BLD STRIP.AUTO-MCNC: 277 MG/DL (ref 65–100)
GLUCOSE SERPL-MCNC: 99 MG/DL (ref 65–100)
HCT VFR BLD AUTO: 40 % (ref 36.6–50.3)
HGB BLD-MCNC: 13.3 G/DL (ref 12.1–17)
IMM GRANULOCYTES # BLD AUTO: 0 K/UL
IMM GRANULOCYTES NFR BLD AUTO: 0 %
LYMPHOCYTES # BLD: 0.3 K/UL (ref 0.8–3.5)
LYMPHOCYTES NFR BLD: 2 % (ref 12–49)
MCH RBC QN AUTO: 27.6 PG (ref 26–34)
MCHC RBC AUTO-ENTMCNC: 33.3 G/DL (ref 30–36.5)
MCV RBC AUTO: 83 FL (ref 80–99)
METAMYELOCYTES NFR BLD MANUAL: 1 %
MONOCYTES # BLD: 0.6 K/UL (ref 0–1)
MONOCYTES NFR BLD: 4 % (ref 5–13)
NEUTS SEG # BLD: 14.4 K/UL (ref 1.8–8)
NEUTS SEG NFR BLD: 93 % (ref 32–75)
NRBC # BLD: 0 K/UL (ref 0–0.01)
NRBC BLD-RTO: 0 PER 100 WBC
PLATELET # BLD AUTO: 336 K/UL (ref 150–400)
PMV BLD AUTO: 9.1 FL (ref 8.9–12.9)
POTASSIUM SERPL-SCNC: 4.1 MMOL/L (ref 3.5–5.1)
PROT SERPL-MCNC: 7.1 G/DL (ref 6.4–8.2)
RBC # BLD AUTO: 4.82 M/UL (ref 4.1–5.7)
RBC MORPH BLD: ABNORMAL
SERVICE CMNT-IMP: ABNORMAL
SERVICE CMNT-IMP: NORMAL
SODIUM SERPL-SCNC: 139 MMOL/L (ref 136–145)
WBC # BLD AUTO: 15.5 K/UL (ref 4.1–11.1)

## 2021-04-19 PROCEDURE — 94640 AIRWAY INHALATION TREATMENT: CPT

## 2021-04-19 PROCEDURE — 99233 SBSQ HOSP IP/OBS HIGH 50: CPT | Performed by: CLINICAL NURSE SPECIALIST

## 2021-04-19 PROCEDURE — 74011250636 HC RX REV CODE- 250/636: Performed by: INTERNAL MEDICINE

## 2021-04-19 PROCEDURE — 94660 CPAP INITIATION&MGMT: CPT

## 2021-04-19 PROCEDURE — 82962 GLUCOSE BLOOD TEST: CPT

## 2021-04-19 PROCEDURE — 85025 COMPLETE CBC W/AUTO DIFF WBC: CPT

## 2021-04-19 PROCEDURE — 80053 COMPREHEN METABOLIC PANEL: CPT

## 2021-04-19 PROCEDURE — 65660000000 HC RM CCU STEPDOWN

## 2021-04-19 PROCEDURE — 85379 FIBRIN DEGRADATION QUANT: CPT

## 2021-04-19 PROCEDURE — 77010033678 HC OXYGEN DAILY

## 2021-04-19 PROCEDURE — 77010033711 HC HIGH FLOW OXYGEN

## 2021-04-19 PROCEDURE — 74011250637 HC RX REV CODE- 250/637: Performed by: INTERNAL MEDICINE

## 2021-04-19 PROCEDURE — 94664 DEMO&/EVAL PT USE INHALER: CPT

## 2021-04-19 PROCEDURE — 36415 COLL VENOUS BLD VENIPUNCTURE: CPT

## 2021-04-19 PROCEDURE — 83880 ASSAY OF NATRIURETIC PEPTIDE: CPT

## 2021-04-19 PROCEDURE — 86140 C-REACTIVE PROTEIN: CPT

## 2021-04-19 RX ORDER — BUMETANIDE 0.25 MG/ML
1 INJECTION INTRAMUSCULAR; INTRAVENOUS DAILY
Status: DISCONTINUED | OUTPATIENT
Start: 2021-04-20 | End: 2021-04-23 | Stop reason: HOSPADM

## 2021-04-19 RX ADMIN — FLUTICASONE PROPIONATE 2 SPRAY: 50 SPRAY, METERED NASAL at 23:21

## 2021-04-19 RX ADMIN — FLUTICASONE PROPIONATE 2 SPRAY: 50 SPRAY, METERED NASAL at 06:33

## 2021-04-19 RX ADMIN — ENOXAPARIN SODIUM 40 MG: 40 INJECTION SUBCUTANEOUS at 16:10

## 2021-04-19 RX ADMIN — ALBUTEROL SULFATE 2 PUFF: 90 AEROSOL, METERED RESPIRATORY (INHALATION) at 07:58

## 2021-04-19 RX ADMIN — METHYLPREDNISOLONE SODIUM SUCCINATE 40 MG: 40 INJECTION, POWDER, FOR SOLUTION INTRAMUSCULAR; INTRAVENOUS at 23:21

## 2021-04-19 RX ADMIN — ENOXAPARIN SODIUM 40 MG: 40 INJECTION SUBCUTANEOUS at 05:05

## 2021-04-19 RX ADMIN — ASPIRIN 81 MG: 81 TABLET, COATED ORAL at 09:40

## 2021-04-19 RX ADMIN — Medication 2000 UNITS: at 09:40

## 2021-04-19 RX ADMIN — ALBUTEROL SULFATE 2 PUFF: 90 AEROSOL, METERED RESPIRATORY (INHALATION) at 11:23

## 2021-04-19 RX ADMIN — THERA TABS 1 TABLET: TAB at 09:40

## 2021-04-19 RX ADMIN — METHYLPREDNISOLONE SODIUM SUCCINATE 40 MG: 40 INJECTION, POWDER, FOR SOLUTION INTRAMUSCULAR; INTRAVENOUS at 16:10

## 2021-04-19 RX ADMIN — Medication 10 ML: at 06:33

## 2021-04-19 RX ADMIN — ALBUTEROL SULFATE 2 PUFF: 90 AEROSOL, METERED RESPIRATORY (INHALATION) at 02:21

## 2021-04-19 RX ADMIN — ALBUTEROL SULFATE 2 PUFF: 90 AEROSOL, METERED RESPIRATORY (INHALATION) at 05:37

## 2021-04-19 RX ADMIN — Medication 10 ML: at 22:00

## 2021-04-19 RX ADMIN — LOSARTAN POTASSIUM 100 MG: 50 TABLET, FILM COATED ORAL at 09:40

## 2021-04-19 RX ADMIN — METHYLPREDNISOLONE SODIUM SUCCINATE 40 MG: 40 INJECTION, POWDER, FOR SOLUTION INTRAMUSCULAR; INTRAVENOUS at 06:33

## 2021-04-19 RX ADMIN — ALBUTEROL SULFATE 2 PUFF: 90 AEROSOL, METERED RESPIRATORY (INHALATION) at 17:19

## 2021-04-19 RX ADMIN — Medication 10 ML: at 16:15

## 2021-04-19 RX ADMIN — ALBUTEROL SULFATE 2 PUFF: 90 AEROSOL, METERED RESPIRATORY (INHALATION) at 20:42

## 2021-04-19 NOTE — DIABETES MGMT
[address] [facility]    CLINICAL NURSE SPECIALIST CONSULT   FOLLOW UP NOTE    Initial Presentation   Arthur Watson is a 39 y.o. male who presents to the ED 4/12/21 with a 4 day complaint of diarrhea with abdominal pain. He had a rapid COVID-19 test which was positive    HX:   Past Medical History:   Diagnosis Date    DM (diabetes mellitus) ([address] Age 32    HLD (hyperlipidaemia)     HTN         DX: Gastritis, COVID-19 pneumonia    TX: IV steroids/IV antibiotics     Hospital course   Clinical progress has been uncomplicated. Diabetes    Patient has known Type 2 diabetes, treated with Metformin and Humalog PTA. Family history positive for diabetes: Mother with Type 2 Diabetes     Admission  and A1c 9.3% indicate poor diabetes control. Ambulatory blood glucose management provided by endocrinologist: James Jimenez MD with [facility].     Consulted by Janee Agustin MD for advanced diabetes nursing assessment and care, specifically related to    [x] Inpatient management strategy      Diabetes-related medical history  Acute complications: Hyperglycemia  Neurological complications  Peripheral neuropathy  Microvascular disease: None  Macrovascular disease: None      Diabetes medication history  Drug class Currently in use Discontinued Never used   Biguanide Metformin 500mg at breakfast and dinner     DDP-4 inhibitor       Sulfonylurea      Thiazolidinedione      GLP-1 RA      SGLT-2 inhibitors      Basal insulin      Bolus insulin      Fixed Dose  Combinations Insulin pump via settings below       Pump settings:  - basal: 12a: 6.35 units/hr   - Carb ratio: 1:3  - sensitivity: 10  - target: 100-120  - active insulin time: 3 hr  Subjective   On highflow O2 with humidification   A1C 9.4%  Blood cultures with NGTD  Continued on methylpred 40mg Q8   S/p actremra 4/14    Continues on insulin pump  Fasting glucose 115  24 hr glucose pattern: 116-180  Basal rate: 6.35 units/hr    4/16/21 Insulin Pump Administration:  MN: 25 units with Solumedrol  7a: 25 units with Solumedrol  942a: 16.65 units for 50g CHO  353p 16.65 units for Lunch carb coverage  618p:  5.05units for correctional  630p: 17.2 units for Dinner carb coverage  848p 18.3 units for snack  Total Daily Dose: 277 units    4/17/21 Insulin Pump Administration:  MN:  17 units for correction, 24.4units for Solumedrol  351a: , 10 units correctional given  721a 25 units given with Solumedrol  728a 16.3 units for correction  1027: 20 units for breakfast  1228: 22 units for correction  238p 18 units for Lunch   351p bg 216 15.3units correction  4p 25 units with solumedrol  620p 18 units for correction  1010p bg 256 24.5 units for correction  1142p: 25 units with Solumedrol, 8.65 units for snack  Total daily dose: 389 units    4/18/21 Insulin Pump Administration:  5a  5 units correction  7a 25 units with Solumedrol  946 20 units with breakfast  141p: 18.65 units for lunch  439 25 units with Solumedrol  5p 25 units snack  739: 20.6 units for dinner  921: 15.65 units correction  1044: 25 with Solumedrol  Total Daily Dose:332 units    4/19/21:  634a: 25 with Solumedrol  910: 16.65 units for breakfast  1220 220 10 units for correction      Objective   Physical exam  General Alert, oriented and in no acute distress/ill-appearing. Conversant and cooperative. Vital Signs   Visit Vitals  /67 (BP 1 Location: Right upper arm, BP Patient Position: At rest)   Pulse (!) 105   Temp 98.2 °F (36.8 °C)   Resp 18   Ht 5' 10\" (1.778 m)   Wt 157.4 kg (347 lb 0.1 oz)   SpO2 91%   BMI 49.79 kg/m²     Skin  Warm and dry. Acanthosis noted along neckline. No lipohypertrophy or lipoatrophy noted at injection sites   Heart   Regular rate and rhythm.  No murmurs, rubs or gallops  Lungs  Clear to auscultation without rales or rhonchi  Extremities No foot wounds      Laboratory      CBC WITH AUTOMATED DIFF Collection Time: 04/19/21  5:12 AM   Result Value Ref Range    WBC 15.5 (H) 4.1 - 11.1 K/uL    RBC 4.82 4.10 - 5.70 M/uL    HGB 13.3 12.1 - 17.0 g/dL    HCT 40.0 36.6 - 50.3 %    MCV 83.0 80.0 - 99.0 FL    MCH 27.6 26.0 - 34.0 PG    MCHC 33.3 30.0 - 36.5 g/dL    RDW 12.3 11.5 - 14.5 %    PLATELET 964 747 - 126 K/uL    MPV 9.1 8.9 - 12.9 FL    NRBC 0.0 0  WBC    ABSOLUTE NRBC 0.00 0.00 - 0.01 K/uL    NEUTROPHILS 93 (H) 32 - 75 %    LYMPHOCYTES 2 (L) 12 - 49 %    MONOCYTES 4 (L) 5 - 13 %    EOSINOPHILS 0 0 - 7 %    BASOPHILS 0 0 - 1 %    METAMYELOCYTES 1 (H) 0 %    IMMATURE GRANULOCYTES 0 %    ABS. NEUTROPHILS 14.4 (H) 1.8 - 8.0 K/UL    ABS. LYMPHOCYTES 0.3 (L) 0.8 - 3.5 K/UL    ABS. MONOCYTES 0.6 0.0 - 1.0 K/UL    ABS. EOSINOPHILS 0.0 0.0 - 0.4 K/UL    ABS. BASOPHILS 0.0 0.0 - 0.1 K/UL    ABS. IMM. GRANS. 0.0 K/UL    DF MANUAL      RBC COMMENTS NORMOCYTIC, NORMOCHROMIC           METABOLIC PANEL, COMPREHENSIVE    Collection Time: 04/19/21  5:12 AM   Result Value Ref Range    Sodium 139 136 - 145 mmol/L    Potassium 4.1 3.5 - 5.1 mmol/L    Chloride 105 97 - 108 mmol/L    CO2 26 21 - 32 mmol/L    Anion gap 8 5 - 15 mmol/L    Glucose 99 65 - 100 mg/dL    BUN 28 (H) 6 - 20 MG/DL    Creatinine 0.95 0.70 - 1.30 MG/DL    BUN/Creatinine ratio 29 (H) 12 - 20      GFR est AA >60 >60 ml/min/1.73m2    GFR est non-AA >60 >60 ml/min/1.73m2    Calcium 8.7 8.5 - 10.1 MG/DL    Bilirubin, total 0.6 0.2 - 1.0 MG/DL    ALT (SGPT) 168 (H) 12 - 78 U/L    AST (SGOT) 44 (H) 15 - 37 U/L    Alk.  phosphatase 44 (L) 45 - 117 U/L    Protein, total 7.1 6.4 - 8.2 g/dL    Albumin 2.8 (L) 3.5 - 5.0 g/dL    Globulin 4.3 (H) 2.0 - 4.0 g/dL    A-G Ratio 0.7 (L) 1.1 - 2.2         All inpatient labs reviewed in full    Factors impacting BG management  Factor Dose Comments   Nutrition:  Carb-controlled meals     60 grams/meal      Drugs:  Steroids   Methylprednisolone 40mg Q8 Methylprednisolone Dosing  8mg of Methylprednisolone dose is equivalent to 0.1units/kg NPH    16 of Methylprednisolone  dose is equivalent to    0.2units/kg NPH  24 of Methylprednisolone  dose is equivalent to 0.3units/kg NPH    36+ of Methylprednisolone  dose is equivalent to 0.4units/kg NPH   Infection/COVID-19  Hyperglycemia in the setting of COVID-19 infection is associated with a blunted immune response and delayed recovery. The subcutaneous insulin order set can be initiated to optimize his blood glucose control in the impatient setting. Blood glucose pattern        Assessment and Plan   Nursing Diagnosis Risk for unstable blood glucose pattern   Nursing Intervention Domain 5252 Decision-making Support   Nursing Interventions Examined current inpatient diabetes control   Explored factors facilitating and impeding inpatient management  Identified self-management practices impeding diabetes control  Explored corrective strategies with patient and responsible inpatient provider   Informed patient of rational for insulin strategy while hospitalized     Evaluation   This  gentleman, with Type 2 diabetes, on very high dose insulin via Med[employer] MiniMed insulin pump did not achieve diabetes control prior to admission, as evidenced by admission BG of 397 and A1c of 9.3%. During this hospitalization, his insulin pump has been resumed and the patient has achieved inpatient blood glucose target of 100-180mg/dl requiring up to 350 units of insulin/day. Several factors have played a role in blood glucose management including:  [x] Critical nature of illness state  [x] Glucocorticoid use    The Subcutaneous Insulin Order set (0968) has not been in use but insulin pump initiated. The PTA pump settings and additional bolus for steroid use is optimally controlling blood glucose. Glucose control will assist in recovery of COVID-19 pneumonia. As steroids are weaned, patient will need to be notified of dose and time changes for insulin pump adjustments.       Recommendations 1. Patient awake and able to independently utilize insulin pump at PTA insulin settings at this time. Patient told to notify RN of ALL insulin boluses. 2. Agree for patient to bolus 25 units with each dose of 40 mg Solumedrol. Added order in EMR: patient to administer 25 units humalog off insulin pump for each 40 mg solumedrol dose. Please notify patient of steroid dose changes    For each dose of methylprednisolone Dosing, patient to give additional insulin bolus off pump:  8mg-15mg  of Methylprednisolone= 6 units Humalog  16-23mg  of Methylprednisolone= 13 units Humalog  24mg-35mg  of Methylprednisolone= 19 units Humalog  36+ of Methylprednisolone  Dose= 25 units Humalog      3. POC glucose ACHS. 4. Patient to bolus off the pump for all carbohydrate intake    5. Patient to enter blood glucose into pump to deliver additional correctional insulin ACHS. 6. RN to document all insulin doses given to patient off insulin pump in EMR (under LDA flow sheet)        If insulin pump needs to be removed (with current steroid order- solumedrol 40mg Q6) back-up settings are:  Basal: 75 units NPH Q12  Bolus: 1 unit Humalog for every 3 grams Carbs  Correctional: 1 unit Humalog for every 10 points over 200  Billing Code(s)   [x] 21372       Before making these care recommendations, I personally reviewed the hosptialization record, including laboratory and diagnostic data, medications and examined the patient at bedside (circumstances permitting).   Total minutes: 28    JOSÉ MIGUEL Peters  Diabetes Clinical Nurse Specialist  [facility]  Access via [employer]

## 2021-04-19 NOTE — PROGRESS NOTES
Pulmonary, Critical Care, and Sleep Medicine~Progress Note    Name: Angel Del Toro MRN: [id number]   : 1975 Hospital: [facility]    Date: 2021 11:33 AM Admission: 2021     Impression Plan   1. COVID 19 + PNA, B + blood type. Vit d deficient   2. Diaerrha, suspect due to above, improving    3. Elevated liver enzymes, improving. Suspected to be induced by COVID19  4. SHALOM, on home CPAP  5. DM II  6. HLD 1. Monitoring inflammatory markers   2. D dimer, probnp to be monitored    3. Cpap at night   4. Solu medrol might lower over the weekend   5. O2 titration above 90% ; on midflow   6. S/p actemra on   7. Diuresis daily  8. lovenox bid dosing      Daily Progression:      On midflow  D dimer 1.57  CRP 0.66  probnp 31  LFTs up a little       Chest film looks a little better  O2 saturations are low  D dimer 0.57  CRP 3.51  probnp 49    4/15  0.51 d dimer   CRP 6.8  probnp 64  Feeling better following actemra    Nasal congestion noted today       Now requiring O2  Feels ok; wore NIV overnight  D dimer 0.42  CRP 9.93       Consult Note requested by hospitalist     Patient presented with increased abdominal pains and diarrhea for the past 5 days. No overt pulmonary complaints were noted. Currently on room air. No pervious pulmonary complaints noted. Still spiking fevers, but feeling better. I have reviewed the labs and previous days notes. Pertinent items are noted in HPI. Past Medical History:   Diagnosis Date    DM (diabetes mellitus) ([facility] [address] Age 29    HLD (hyperlipidaemia)     HTN       Past Surgical History:   Procedure Laterality Date    HX ORTHOPAEDIC Left 1998    pins in toes of foot    HX REFRACTIVE SURGERY        Prior to Admission medications    Medication Sig Start Date End Date Taking? Authorizing Provider   insulin pump (PATIENT SUPPLIED) misc by SubCUTAneous route continuous.    Yes Provider, Historical   metFORMIN ER (GLUCOPHAGE XR) 500 mg tablet TAKE 2 TABLETS WITH BREAKFAST AND DINNER 3/31/21  Yes Gilda Gordon MD   atorvastatin (LIPITOR) 10 mg tablet TAKE 1 TABLET DAILY 3/3/21  Yes Laurel Ramirez MD   losartan (COZAAR) 100 mg tablet TAKE 1 TABLET DAILY 20  Yes Laurel Ramirez MD   chlorthalidone (HYGROTEN) 25 mg tablet TAKE 1 TABLET DAILY 20  Yes Laurel Ramirez MD   multivitamin (DAILY MULTI-VITAMIN) tablet Take 1 Tab by mouth daily. Yes Provider, Historical   aspirin 81 mg tablet Take 81 mg by mouth daily. Yes Provider, Historical     Allergies   Allergen Reactions    Lisinopril Cough      Social History     Tobacco Use    Smoking status: Never Smoker    Smokeless tobacco: Never Used   Substance Use Topics    Alcohol use: Yes     Alcohol/week: 0.0 standard drinks     Comment: glass of wine 1-2 times a month      Family History   Problem Relation Age of Onset    Diabetes Mother     Heart Attack Father 61    Diabetes Maternal Grandmother     Diabetes Maternal Grandfather     Diabetes Paternal Grandmother     Diabetes Paternal Grandfather      OBJECTIVE:     Vital Signs:       Visit Vitals  BP (!) 136/97 (BP 1 Location: Left upper arm, BP Patient Position: At rest)   Pulse (!) 112   Temp 98 °F (36.7 °C)   Resp 18   Ht 5' 10\" (1.778 m)   Wt 157.4 kg (347 lb 0.1 oz)   SpO2 93%   BMI 49.79 kg/m²      Temp (24hrs), Av °F (36.7 °C), Min:97.6 °F (36.4 °C), Max:98.5 °F (36.9 °C)     Intake/Output:     Last shift: No intake/output data recorded. Last 3 shifts: No intake/output data recorded.         No intake or output data in the 24 hours ending 21 1156    Physical Exam:                                        Exam Findings Other   General: No resp distress noted, appears stated age    HEENT:  No ulcers, JVD not elevated, no cervical LAD    Chest: No pectus deformity, normal chest rise b/l    HEART:  No visible thrills    Lungs:  Normal expansion     ABD: Soft/NT, non rigid mildly distended    EXT: No cyanosis/clubbing/edema, normal peripheral pulses    Skin: No rashes or ulcers, no mottling    Neuro: A/O x 3        Medications:  Current Facility-Administered Medications   Medication Dose Route Frequency    methylPREDNISolone (PF) (SOLU-MEDROL) injection 40 mg  40 mg IntraVENous Q8H    albuterol (PROVENTIL HFA, VENTOLIN HFA, PROAIR HFA) inhaler 2 Puff  2 Puff Inhalation Q4H RT    fluticasone propionate (FLONASE) 50 mcg/actuation nasal spray 2 Spray  2 Spray Both Nostrils ACB/HS    sodium chloride (OCEAN) 0.65 % nasal squeeze bottle 2 Spray  2 Spray Both Nostrils Q2H PRN    aspirin delayed-release tablet 81 mg  81 mg Oral DAILY    losartan (COZAAR) tablet 100 mg  100 mg Oral DAILY    sodium chloride (OCEAN) 0.65 % nasal squeeze bottle 2 Spray  2 Spray Both Nostrils Q2H PRN    insulin pump (PATIENT SUPPLIED)   SubCUTAneous PRN    sodium chloride (NS) flush 5-10 mL  5-10 mL IntraVENous PRN    sodium chloride (NS) flush 5-40 mL  5-40 mL IntraVENous Q8H    sodium chloride (NS) flush 5-40 mL  5-40 mL IntraVENous PRN    acetaminophen (TYLENOL) tablet 650 mg  650 mg Oral Q6H PRN    Or    acetaminophen (TYLENOL) suppository 650 mg  650 mg Rectal Q6H PRN    polyethylene glycol (MIRALAX) packet 17 g  17 g Oral DAILY PRN    promethazine (PHENERGAN) tablet 12.5 mg  12.5 mg Oral Q6H PRN    Or    ondansetron (ZOFRAN) injection 4 mg  4 mg IntraVENous Q6H PRN    cholecalciferol (VITAMIN D3) (1000 Units /25 mcg) tablet 2,000 Units  2,000 Units Oral DAILY    guaiFENesin-dextromethorphan (ROBITUSSIN DM) 100-10 mg/5 mL syrup 5 mL  5 mL Oral Q4H PRN    benzonatate (TESSALON) capsule 200 mg  200 mg Oral TID PRN    therapeutic multivitamin (THERAGRAN) tablet 1 Tab  1 Tab Oral DAILY    enoxaparin (LOVENOX) injection 40 mg  40 mg SubCUTAneous Q12H    glucose chewable tablet 16 g  4 Tab Oral PRN    dextrose (D50W) injection syrg 12.5-25 g  25-50 mL IntraVENous PRN    glucagon (GLUCAGEN) injection 1 mg  1 mg IntraMUSCular PRN       Labs:  ABG No results for input(s): PHI, PCO2I, PO2I, HCO3I, SO2I, FIO2I in the last 72 hours.      CBC Recent Labs     04/19/21  0512 04/17/21 0410   WBC 15.5* 10.5   HGB 13.3 12.8   HCT 40.0 38.1    298   MCV 83.0 82.6   MCH 27.6 41.6        Metabolic  Panel Recent Labs     04/19/21  0512 04/17/21  0410    135*   K 4.1 4.0    103   CO2 26 22   GLU 99 223*   BUN 28* 27*   CREA 0.95 0.89   CA 8.7 8.5   ALB 2.8* 2.9*   * 184*        Pertinent Labs                Jordan Degroot PA-C  4/19/2021

## 2021-04-19 NOTE — PROGRESS NOTES
[address] [facility] Adult  Hospitalist Group                                                                                          Hospitalist Progress Note  Massiel Tapia MD  Answering service: [phone] from in house phone        Date of Service:  2021  NAME:  Jeannine Rodriguez Sr.  :  1975  MRN:  [id number]      Admission Summary:   Jeannine Rodriguez Sr. is a 39 y.o. male with PMH of DM 2 on insulin pump, HTN, HLD, SHALOM on CPAP at home nightly, came to ED for evaluation of diarrhea going on for 5 days. Watery stools, 3-5 BMs per day, not able to keep anything inside, without any associated nausea/ vomiting/ fever/ chills/ abdominal pain. Patient denied any sore throat, cough, congestion, dizziness, lightheadedness, loss of appetite, muscle ache, body ache, fatigue, tiredness, constipation or urinary trouble. In the ED, patient was evaluated with COVID-19 rapid testing which turned positive. Hospitalist team asked to admit the patient for further evaluation. Interval history / Subjective:   Patient was seen and examined. No acute events overnight. Sitting in chair on high flow nasal cannula.     \"I feel the same from yesterday\"     Assessment & Plan:     # Acute hypoxic respiratory failure - progressively worsening  # COVID-19 virus  # Diarrhea, improved, not problematic now  # Bilateral pneumonia   -S/p IV Actemra x 1 on   -He was evaluated for remdesivir but he was not a candidate for Remdesivir due to elevated LFTs, borderline time duration of symptom onset, almost close to 7-day on  when started getting hypoxic   -Previous hospitalists discussed with Pulm about cPlasma - Does not seem as a potential option due to timing in the course of illness  -appreciate Pulmonology input  -Remain on Solu-Medrol 40mg q8h since   - CXR : Mild patchy interstitial and airspace opacities with improvement  -On high flow nasal cannula 40 L this a.m. saturating 92%  - trail of lasix again 4/16 but no I/O to determine efficacy  - continue ICMU care, albuterol MDI, Flonase  - inflammatory markers:  D-dimer increasing, CRP improving. Continue to monitor.  -Very high risk for deterioration, continue close monitoring    # DM2 with hyperglycemia, on insulin pump with Humalog.    - Significant insulin resistant. -DM education team on board and patient is managing BS with his pump   -Blood sugars not controlled the same. Goal 140/90 during hospitalization. # HTN, controlled   - restarted losartan. chlorthalidone on hold  -Blood pressures well controlled this a.m. # Hyponatremia, related to dehydration - resolved   # Mild MARYCARMEN - Resolved   # Transaminases, likely viral syndrome related, will monitor in am    # HLD-statins are on hold due to transaminitis  # SHALOM on CPAP at night. Tolerating well     High risk for rapid decompensation.       Morbid obesity  -Counseling was provided about weight loss    Code status: Full code  DVT prophylaxis: Lovenox SQ per COVID-19 protocol    Care Plan discussed with: patient  Anticipated Disposition: Home w/Family  Anticipated Discharge: Greater than 48 hours     Hospital Problems  Date Reviewed: 11/19/2020          Codes Class Noted POA    Hyponatremia ICD-10-CM: E87.1  ICD-9-CM: 276.1  4/12/2021 Unknown        Acute respiratory failure with hypoxemia ([facility] [address] ICD-10-CM: J96.01  ICD-9-CM: 518.81  4/12/2021 Unknown        Pneumonia due to COVID-19 virus ICD-10-CM: U07.1, J12.82  ICD-9-CM: 480.8, 079.89  4/12/2021 Unknown                Review of Systems:   Review of Systems:  Symptom Y/N Comments  Symptom Y/N Comments   Fever/Chills n   Chest Pain n    Poor Appetite    Edema     Cough n   Abdominal Pain n    Sputum    Joint Pain     SOB/NORTH y  same from yesterday  Pruritis/Rash     Nausea/vomit n   Tolerating PT/OT     Diarrhea    Tolerating Diet y    Constipation    Other       Could NOT obtain due to:          Vital Signs:    Last 24hrs VS reviewed since prior progress note. Most recent are:  Visit Vitals  BP (!) 136/97 (BP 1 Location: Left upper arm, BP Patient Position: At rest)   Pulse (!) 112   Temp 98 °F (36.7 °C)   Resp 18   Ht 5' 10\" (1.778 m)   Wt 157.4 kg (347 lb 0.1 oz)   SpO2 90%   BMI 49.79 kg/m²       No intake or output data in the 24 hours ending 04/19/21 1120     Physical Examination:     I had a face to face encounter with this patient and independently examined them on 4/19/2021 as outlined below:          General:          Alert, cooperative, No Respiratory distress in morning, appears stated age. Morbid obesity. Sitting in chair watching TV  Lungs:             No basal crackles but reducing AE bibasilar. No wheezing/rhonchi/rales, good AE bilaterally. Chest wall:      No tenderness  No Accessory muscle use. Heart:              Regular  rhythm,  No  murmur   No edema  Abdomen:        Soft, non-tender. Not distended. Bowel sounds normal  Extremities:     No cyanosis. No clubbing,  trace LE edema                          Skin turgor normal, Capillary refill normal  Psych:             Not anxious or agitated. Neurologic:      Alert, moves all extremities, answers questions appropriately and responds to commands          Data Review:        Xr Chest Pa Lat    Result Date: 4/12/2021  Mild bilateral perihilar interstitial infiltrates are concerning for atypical viral pneumonia. Ct Abd Pelv Wo Cont    Result Date: 4/12/2021  1. No evidence of acute process in the abdomen or pelvis. No urolithiasis. 2. Hepatic steatosis. 3. Findings consistent with COVID pneumonia throughout the bilateral lower lungs. Xr Chest Port    Result Date: 4/16/2021  Mild patchy interstitial and airspace opacities with improvement from the prior study.     Xr Chest Port    Result Date: 4/14/2021  Stable bilateral lower lobe infiltrates      Labs:     Recent Labs     04/19/21  0512 04/17/21  0410   WBC 15.5* 10.5   HGB 13.3 12.8   HCT 40.0 38.1    298 Recent Labs     04/19/21  0512 04/17/21  0410    135*   K 4.1 4.0    103   CO2 26 22   BUN 28* 27*   CREA 0.95 0.89   GLU 99 223*   CA 8.7 8.5     Recent Labs     04/19/21  0512 04/17/21  0410   * 184*   AP 44* 52   TBILI 0.6 0.6   TP 7.1 7.4   ALB 2.8* 2.9*   GLOB 4.3* 4.5*     No results for input(s): INR, PTP, APTT, INREXT, INREXT in the last 72 hours. No results for input(s): FE, TIBC, PSAT, FERR in the last 72 hours. No results found for: FOL, RBCF   No results for input(s): PH, PCO2, PO2 in the last 72 hours. No results for input(s): CPK, CKNDX, TROIQ in the last 72 hours.     No lab exists for component: CPKMB  Lab Results   Component Value Date/Time    Cholesterol, total 76 10/15/2020 09:26 AM    HDL Cholesterol 30 10/15/2020 09:26 AM    LDL, calculated 23 10/15/2020 09:26 AM    Triglyceride 115 10/15/2020 09:26 AM    CHOL/HDL Ratio 2.5 10/15/2020 09:26 AM     Lab Results   Component Value Date/Time    Glucose (POC) 116 (H) 04/19/2021 08:30 AM    Glucose (POC) 100 04/19/2021 05:28 AM    Glucose (POC) 100 04/18/2021 08:55 PM    Glucose (POC) 227 (H) 04/18/2021 04:40 PM    Glucose (POC) 152 (H) 04/18/2021 06:56 AM     Lab Results   Component Value Date/Time    Color YELLOW/STRAW 04/12/2021 11:16 AM    Appearance CLEAR 04/12/2021 11:16 AM    Specific gravity 1.029 04/12/2021 11:16 AM    pH (UA) 5.5 04/12/2021 11:16 AM    Protein 100 (A) 04/12/2021 11:16 AM    Glucose >1,000 (A) 04/12/2021 11:16 AM    Ketone 80 (A) 04/12/2021 11:16 AM    Bilirubin Negative 04/12/2021 11:16 AM    Urobilinogen 0.2 04/12/2021 11:16 AM    Nitrites Negative 04/12/2021 11:16 AM    Leukocyte Esterase Negative 04/12/2021 11:16 AM    Epithelial cells FEW 04/12/2021 11:16 AM    Bacteria 4+ (A) 04/12/2021 11:16 AM    WBC 5-10 04/12/2021 11:16 AM    RBC 0-5 04/12/2021 11:16 AM         Medications Reviewed:     Current Facility-Administered Medications   Medication Dose Route Frequency    methylPREDNISolone (PF) (SOLU-MEDROL) injection 40 mg  40 mg IntraVENous Q8H    albuterol (PROVENTIL HFA, VENTOLIN HFA, PROAIR HFA) inhaler 2 Puff  2 Puff Inhalation Q4H RT    fluticasone propionate (FLONASE) 50 mcg/actuation nasal spray 2 Spray  2 Spray Both Nostrils ACB/HS    sodium chloride (OCEAN) 0.65 % nasal squeeze bottle 2 Spray  2 Spray Both Nostrils Q2H PRN    aspirin delayed-release tablet 81 mg  81 mg Oral DAILY    losartan (COZAAR) tablet 100 mg  100 mg Oral DAILY    sodium chloride (OCEAN) 0.65 % nasal squeeze bottle 2 Spray  2 Spray Both Nostrils Q2H PRN    insulin pump (PATIENT SUPPLIED)   SubCUTAneous PRN    sodium chloride (NS) flush 5-10 mL  5-10 mL IntraVENous PRN    sodium chloride (NS) flush 5-40 mL  5-40 mL IntraVENous Q8H    sodium chloride (NS) flush 5-40 mL  5-40 mL IntraVENous PRN    acetaminophen (TYLENOL) tablet 650 mg  650 mg Oral Q6H PRN    Or    acetaminophen (TYLENOL) suppository 650 mg  650 mg Rectal Q6H PRN    polyethylene glycol (MIRALAX) packet 17 g  17 g Oral DAILY PRN    promethazine (PHENERGAN) tablet 12.5 mg  12.5 mg Oral Q6H PRN    Or    ondansetron (ZOFRAN) injection 4 mg  4 mg IntraVENous Q6H PRN    cholecalciferol (VITAMIN D3) (1000 Units /25 mcg) tablet 2,000 Units  2,000 Units Oral DAILY    guaiFENesin-dextromethorphan (ROBITUSSIN DM) 100-10 mg/5 mL syrup 5 mL  5 mL Oral Q4H PRN    benzonatate (TESSALON) capsule 200 mg  200 mg Oral TID PRN    therapeutic multivitamin (THERAGRAN) tablet 1 Tab  1 Tab Oral DAILY    enoxaparin (LOVENOX) injection 40 mg  40 mg SubCUTAneous Q12H    glucose chewable tablet 16 g  4 Tab Oral PRN    dextrose (D50W) injection syrg 12.5-25 g  25-50 mL IntraVENous PRN    glucagon (GLUCAGEN) injection 1 mg  1 mg IntraMUSCular PRN     ______________________________________________________________________  EXPECTED LENGTH OF STAY: 4d 19h  ACTUAL LENGTH OF STAY:          7                 Geovanni Devries MD

## 2021-04-19 NOTE — PROGRESS NOTES
Bedside, Verbal and Written shift change report given to Kitty Lindsey  (oncoming nurse) by Alex Segura (offgoing nurse). Report included the following information SBAR, Kardex, ED Summary, Procedure Summary, Intake/Output, MAR, Accordion, Recent Results, Med Rec Status, Cardiac Rhythm NSR-Sinus tach, Alarm Parameters , Pre Procedure Checklist, Procedure Verification, Quality Measures and Dual Neuro Assessment.

## 2021-04-19 NOTE — PROGRESS NOTES
04/18/21 2227   CPAP/BIPAP   Pt's Home Machine No     Patient to use hospital CPAP. Patients home CPAP is in poor condition.  Does not meet criteria for use in hospital.

## 2021-04-20 LAB
GLUCOSE BLD STRIP.AUTO-MCNC: 105 MG/DL (ref 65–100)
GLUCOSE BLD STRIP.AUTO-MCNC: 184 MG/DL (ref 65–100)
GLUCOSE BLD STRIP.AUTO-MCNC: 219 MG/DL (ref 65–100)
GLUCOSE BLD STRIP.AUTO-MCNC: 223 MG/DL (ref 65–100)
GLUCOSE BLD STRIP.AUTO-MCNC: 226 MG/DL (ref 65–100)
SERVICE CMNT-IMP: ABNORMAL

## 2021-04-20 PROCEDURE — 74011250636 HC RX REV CODE- 250/636: Performed by: INTERNAL MEDICINE

## 2021-04-20 PROCEDURE — 94664 DEMO&/EVAL PT USE INHALER: CPT

## 2021-04-20 PROCEDURE — 99231 SBSQ HOSP IP/OBS SF/LOW 25: CPT | Performed by: CLINICAL NURSE SPECIALIST

## 2021-04-20 PROCEDURE — 77010033678 HC OXYGEN DAILY

## 2021-04-20 PROCEDURE — 77010033711 HC HIGH FLOW OXYGEN

## 2021-04-20 PROCEDURE — 74011250637 HC RX REV CODE- 250/637: Performed by: INTERNAL MEDICINE

## 2021-04-20 PROCEDURE — 94660 CPAP INITIATION&MGMT: CPT

## 2021-04-20 PROCEDURE — 74011250636 HC RX REV CODE- 250/636: Performed by: PHYSICIAN ASSISTANT

## 2021-04-20 PROCEDURE — 74011000250 HC RX REV CODE- 250: Performed by: PHYSICIAN ASSISTANT

## 2021-04-20 PROCEDURE — 65660000000 HC RM CCU STEPDOWN

## 2021-04-20 PROCEDURE — 94640 AIRWAY INHALATION TREATMENT: CPT

## 2021-04-20 PROCEDURE — 82962 GLUCOSE BLOOD TEST: CPT

## 2021-04-20 RX ADMIN — ENOXAPARIN SODIUM 40 MG: 40 INJECTION SUBCUTANEOUS at 17:37

## 2021-04-20 RX ADMIN — METHYLPREDNISOLONE SODIUM SUCCINATE 40 MG: 40 INJECTION, POWDER, FOR SOLUTION INTRAMUSCULAR; INTRAVENOUS at 06:36

## 2021-04-20 RX ADMIN — METHYLPREDNISOLONE SODIUM SUCCINATE 40 MG: 40 INJECTION, POWDER, FOR SOLUTION INTRAMUSCULAR; INTRAVENOUS at 22:44

## 2021-04-20 RX ADMIN — BUMETANIDE 1 MG: 0.25 INJECTION INTRAMUSCULAR; INTRAVENOUS at 09:58

## 2021-04-20 RX ADMIN — ALBUTEROL SULFATE 2 PUFF: 90 AEROSOL, METERED RESPIRATORY (INHALATION) at 11:55

## 2021-04-20 RX ADMIN — ALBUTEROL SULFATE 2 PUFF: 90 AEROSOL, METERED RESPIRATORY (INHALATION) at 01:42

## 2021-04-20 RX ADMIN — FLUTICASONE PROPIONATE 2 SPRAY: 50 SPRAY, METERED NASAL at 06:36

## 2021-04-20 RX ADMIN — ASPIRIN 81 MG: 81 TABLET, COATED ORAL at 09:59

## 2021-04-20 RX ADMIN — Medication 10 ML: at 22:44

## 2021-04-20 RX ADMIN — LOSARTAN POTASSIUM 100 MG: 50 TABLET, FILM COATED ORAL at 09:59

## 2021-04-20 RX ADMIN — Medication 10 ML: at 14:00

## 2021-04-20 RX ADMIN — ALBUTEROL SULFATE 2 PUFF: 90 AEROSOL, METERED RESPIRATORY (INHALATION) at 15:45

## 2021-04-20 RX ADMIN — ENOXAPARIN SODIUM 40 MG: 40 INJECTION SUBCUTANEOUS at 06:36

## 2021-04-20 RX ADMIN — ALBUTEROL SULFATE 2 PUFF: 90 AEROSOL, METERED RESPIRATORY (INHALATION) at 20:00

## 2021-04-20 RX ADMIN — THERA TABS 1 TABLET: TAB at 09:59

## 2021-04-20 RX ADMIN — ALBUTEROL SULFATE 2 PUFF: 90 AEROSOL, METERED RESPIRATORY (INHALATION) at 07:13

## 2021-04-20 RX ADMIN — Medication 2000 UNITS: at 09:58

## 2021-04-20 RX ADMIN — Medication 10 ML: at 06:45

## 2021-04-20 RX ADMIN — ALBUTEROL SULFATE 2 PUFF: 90 AEROSOL, METERED RESPIRATORY (INHALATION) at 05:55

## 2021-04-20 NOTE — PROGRESS NOTES
Hospitalist Progress Note          Mary Feliciano MD  Please call  and page for questions. Call physician on-call through the  7pm-7am    Daily Progress Note: 4/20/2021    Primary care provider:Lisa Ashford MD    Date of admission: 4/12/2021 11:55 AM    Admission summery and hospital course:  39 y. o. male with PMH of DM 2 on insulin pump, HTN, HLD, SHALOM on CPAP at home nightly, came to ED on April 12 for evaluation of diarrhea going on for 5 days, associated nausea/ vomiting/ fever/ chills/ abdominal pain.  In the ED, patient was evaluated with COVID-19 rapid testing which turned positive. Subjective:   Patient said he has no issues at this time. Patient said he had nasal bleed last night with CPAP, it has been resolved now as per patient. Patient has nonproductive coughing especially at the morning time. Assessment/Plan:   Acute hypoxic respiratory failure - progressively worsening  COVID-19 virus  Diarrhea, improved, not problematic now  Bilateral pneumonia   -S/p IV Actemra x 1 on 4/14  -He was evaluated for remdesivir but he was not a candidate for Remdesivir due to elevated LFTs, borderline time duration of symptom onset, almost close to 7-day on 4/14 when started getting hypoxic   -Previous hospitalists discussed with Pulm about cPlasma - Does not seem as a potential option due to timing in the course of illness  -appreciate Pulmonology input  -Remain on Solu-Medrol 40mg q8h since 4/16  - CXR 4/16: Mild patchy interstitial and airspace opacities with improvement  -On high flow nasal cannula 14 L/min, improving as per RN.   - On Bumex 1 mg daily from April 20.   - continue ICMU care, albuterol MDI, Flonase  - inflammatory markers:  D-dimer increasing, CRP improving. Continue to monitor.  -Very high risk for deterioration, continue close monitoring     DM2 with hyperglycemia, on insulin pump with Humalog.    - Significant insulin resistant. -DM education team on board and patient is managing BS with his pump   -Blood sugars not controlled the same. Goal 140/90 during hospitalization.     Hypertension, controlled  Continue losartan and follow.     Hyponatremia, related to dehydration - resolved   Mild MARYCARMEN - Resolved   Transaminases, likely viral related, improving,  continue to monitor. HLD-statins are on hold due to transaminitis  SHALOM on CPAP at night. Tolerating well.     Morbid obesity: Counseling was provided about weight loss     Code status: Full code  DVT prophylaxis: Lovenox SQ per COVID-19 protocol     Care Plan discussed with: patient  Anticipated Disposition: Home w/Family  Anticipated Discharge: Greater than 48 ho    See orders for other plans. VTE prophylaxis: Lovenox twice daily  Code status: Full code  Discussed plan of care with Patient/Family and Nurse. Pre-admission lived at home. Discharge planning: pending. High risk of deterioration. Review of Systems:     Review of Systems:  Symptom  Y/N  Comments   Symptom  Y/N  Comments    Fever/Chills  n    Chest Pain  n    Poor Appetite  n    Edema  n     Cough  y   Abdominal Pain  n     Sputum  n   Joint Pain      SOB/NORTH  n   Pruritis/Rash      Nausea/vomit  n   Tolerating PT/OT      Diarrhea  n   Tolerating Diet      Constipation     Other      Could not obtain due to:         Objective:   Physical Exam:     Visit Vitals  /62 (BP 1 Location: Right lower arm)   Pulse 99   Temp 97.5 °F (36.4 °C)   Resp 20   Ht 5' 10\" (1.778 m)   Wt 157.4 kg (347 lb 0.1 oz)   SpO2 93%   BMI 49.79 kg/m²    O2 Flow Rate (L/min): 14 l/min O2 Device: Nasal cannula    Temp (24hrs), Av.8 °F (36.6 °C), Min:97.5 °F (36.4 °C), Max:97.9 °F (36.6 °C)     0701 -  1900  In: 600 [P.O.:600]  Out: -    No intake/output data recorded. General:  Alert, cooperative, no distress, appears stated age. Patient was sitting up in the chair when I entered the room.   Patient kept on watching his iPhone for a while when I was standing in the room. Lungs:   Clear to auscultation bilaterally. Heart:  Regular rate and rhythm, S1, S2 normal, no murmur. Abdomen:    Obese, soft, non-tender. Bowel sounds normal.    Extremities: Extremities normal, atraumatic, no cyanosis or edema. Neurologic:  Patient has clear voice. Patient was not attentive to my presence at the beginning. Data Review:       Recent Days:  Recent Labs     04/19/21  0512   WBC 15.5*   HGB 13.3   HCT 40.0        Recent Labs     04/19/21  0512      K 4.1      CO2 26   GLU 99   BUN 28*   CREA 0.95   CA 8.7   ALB 2.8*   *     No results for input(s): PH, PCO2, PO2, HCO3, FIO2 in the last 72 hours.     24 Hour Results:  Recent Results (from the past 24 hour(s))   GLUCOSE, POC    Collection Time: 04/19/21  9:35 PM   Result Value Ref Range    Glucose (POC) 152 (H) 65 - 100 mg/dL    Performed by Will Gutiérrez, POC    Collection Time: 04/20/21  8:54 AM   Result Value Ref Range    Glucose (POC) 105 (H) 65 - 100 mg/dL    Performed by Kari GARCIA    GLUCOSE, POC    Collection Time: 04/20/21 12:43 PM   Result Value Ref Range    Glucose (POC) 184 (H) 65 - 100 mg/dL    Performed by Miesha farah RN    GLUCOSE, POC    Collection Time: 04/20/21  5:02 PM   Result Value Ref Range    Glucose (POC) 219 (H) 65 - 100 mg/dL    Performed by Will Gutiérrez, POC    Collection Time: 04/20/21  5:32 PM   Result Value Ref Range    Glucose (POC) 223 (H) 65 - 100 mg/dL    Performed by Miesha farah RN        Problem List:  Problem List as of 4/20/2021 Date Reviewed: 11/19/2020          Codes Class Noted - Resolved    Hyponatremia ICD-10-CM: E87.1  ICD-9-CM: 276.1  4/12/2021 - Present        Acute respiratory failure with hypoxemia [facility]) ICD-10-CM: J96.01  ICD-9-CM: 518.81  4/12/2021 - Present        Pneumonia due to COVID-19 virus ICD-10-CM: U07.1, J12.82  ICD-9-CM: 480.8, 079.89  4/12/2021 - Present        SHALOM on CPAP ICD-10-CM: G47.33, Z99.89  ICD-9-CM: 327.23, V46.8  11/25/2019 - Present        Obesity, morbid ([facility] ICD-10-CM: E66.01  ICD-9-CM: 278.01  1/18/2018 - Present        Retinopathy due to secondary diabetes [facility]) ICD-10-CM: E13.319  ICD-9-CM: 249.50, 362.01  11/8/2016 - Present        Diabetes mellitus due to underlying condition with mild nonproliferative diabetic retinopathy without macular edema (HCC) ICD-10-CM: E84.8235  ICD-9-CM: 249.50, 362.04  5/29/2015 - Present        Wound of right leg ICD-10-CM: S81.801A  ICD-9-CM: 891.0  5/7/2013 - Present        Cellulitis and abscess of leg ICD-10-CM: L03.119, L02.419  ICD-9-CM: 682.6  5/7/2013 - Present        DM (diabetes mellitus) ([facility] ICD-10-CM: E11.9  ICD-9-CM: 250.00  6/1/2010 - Present    Overview Addendum 11/15/2018  8:54 AM by Melissa Brown MD     Pump basal  6.2  Up basal to 4 units at  Night, up to 5.2 (1/2015)    Bolus  20 to 25   Effective 5/15   15 to 20             HLD (hyperlipidemia) ICD-10-CM: E78.5  ICD-9-CM: 272.4  6/1/2010 - Present        HTN (hypertension) ICD-10-CM: I10  ICD-9-CM: 401.9  6/1/2010 - Present              Medications reviewed  Current Facility-Administered Medications   Medication Dose Route Frequency    methylPREDNISolone (PF) (SOLU-MEDROL) injection 40 mg  40 mg IntraVENous Q12H    bumetanide (BUMEX) injection 1 mg  1 mg IntraVENous DAILY    albuterol (PROVENTIL HFA, VENTOLIN HFA, PROAIR HFA) inhaler 2 Puff  2 Puff Inhalation Q4H RT    fluticasone propionate (FLONASE) 50 mcg/actuation nasal spray 2 Spray  2 Spray Both Nostrils ACB/HS    sodium chloride (OCEAN) 0.65 % nasal squeeze bottle 2 Spray  2 Spray Both Nostrils Q2H PRN    aspirin delayed-release tablet 81 mg  81 mg Oral DAILY    losartan (COZAAR) tablet 100 mg  100 mg Oral DAILY    insulin pump (PATIENT SUPPLIED)   SubCUTAneous PRN    sodium chloride (NS) flush 5-10 mL  5-10 mL IntraVENous PRN    sodium chloride (NS) flush 5-40 mL  5-40 mL IntraVENous Q8H    sodium chloride (NS) flush 5-40 mL  5-40 mL IntraVENous PRN    acetaminophen (TYLENOL) tablet 650 mg  650 mg Oral Q6H PRN    Or    acetaminophen (TYLENOL) suppository 650 mg  650 mg Rectal Q6H PRN    polyethylene glycol (MIRALAX) packet 17 g  17 g Oral DAILY PRN    promethazine (PHENERGAN) tablet 12.5 mg  12.5 mg Oral Q6H PRN    Or    ondansetron (ZOFRAN) injection 4 mg  4 mg IntraVENous Q6H PRN    cholecalciferol (VITAMIN D3) (1000 Units /25 mcg) tablet 2,000 Units  2,000 Units Oral DAILY    guaiFENesin-dextromethorphan (ROBITUSSIN DM) 100-10 mg/5 mL syrup 5 mL  5 mL Oral Q4H PRN    benzonatate (TESSALON) capsule 200 mg  200 mg Oral TID PRN    therapeutic multivitamin (THERAGRAN) tablet 1 Tab  1 Tab Oral DAILY    enoxaparin (LOVENOX) injection 40 mg  40 mg SubCUTAneous Q12H    glucose chewable tablet 16 g  4 Tab Oral PRN    dextrose (D50W) injection syrg 12.5-25 g  25-50 mL IntraVENous PRN    glucagon (GLUCAGEN) injection 1 mg  1 mg IntraMUSCular PRN       Care Plan discussed with: Patient/Family and Nurse    Total time spent with patient: 50 minutes.     Galilea Bermudez MD

## 2021-04-20 NOTE — DIABETES MGMT
[facility]    CLINICAL NURSE SPECIALIST CONSULT   FOLLOW UP NOTE    Initial Presentation   Pb Rucker is a 39 y.o. male who presents to the ED 4/12/21 with a 4 day complaint of diarrhea with abdominal pain. He had a rapid COVID-19 test which was positive    HX:   Past Medical History:   Diagnosis Date    DM (diabetes mellitus) ([facility] [address] Age 32    HLD (hyperlipidaemia)     HTN         DX: Gastritis, COVID-19 pneumonia    TX: IV steroids/IV antibiotics     Hospital course   Clinical progress has been uncomplicated. Diabetes    Patient has known Type 2 diabetes, treated with Metformin and Humalog PTA. Family history positive for diabetes: Mother with Type 2 Diabetes     Admission  and A1c 9.3% indicate poor diabetes control. Ambulatory blood glucose management provided by endocrinologist: Roshni Casas MD with [facility].     Consulted by Norris Escoto MD for advanced diabetes nursing assessment and care, specifically related to    [x] Inpatient management strategy      Diabetes-related medical history  Acute complications: Hyperglycemia  Neurological complications  Peripheral neuropathy  Microvascular disease: None  Macrovascular disease: None      Diabetes medication history  Drug class Currently in use Discontinued Never used   Biguanide Metformin 500mg at breakfast and dinner     DDP-4 inhibitor       Sulfonylurea      Thiazolidinedione      GLP-1 RA      SGLT-2 inhibitors      Basal insulin      Bolus insulin      Fixed Dose  Combinations Insulin pump via settings below       Pump settings:  - basal: 12a: 6.35 units/hr   - Carb ratio: 1:3  - sensitivity: 10  - target: 100-120  - active insulin time: 3 hr  Subjective   On highflow O2 with humidification, CPAP at night  A1C 9.4%  Blood cultures with NGTD  Continued on methylpred 40mg Q8- weaned to Q12  S/p actremra 4/14    Continues on insulin pump  Fasting glucose 105  24 hr glucose pattern: 105-277  Basal rate: 6.35 units/hr    4/18/21 Insulin Pump Administration:  5a  5 units correction  7a 25 units with Solumedrol  946 20 units with breakfast  141p: 18.65 units for lunch  439 25 units with Solumedrol  5p 25 units snack  739: 20.6 units for dinner  921: 15.65 units correction  1044: 25 with Solumedrol  Total Daily Dose:332 units    4/19/21 Insulin Pump Administration:  634a: 25 with Solumedrol  910: 16.65 units for breakfast  1220 220 10 units for correction  145p: 16.65 for lunch coverage  411: , 21.45 units correctional  417pm: 24.75 units with Solu-Medrol   8p: 25 units for carb intake at dinner  10p: 18.3 units for correctional  1122pm: 25 units with Solu-Medrol  Total Daily Dose: 336 units    4/20/21:  652a: 24.6 units with Solu-Medrol  9a: 20 units for breakfast      Objective   Physical exam  General Alert, oriented and in no acute distress/ill-appearing. Conversant and cooperative. Vital Signs   Visit Vitals  BP (!) 146/91 (BP 1 Location: Right upper arm, BP Patient Position: At rest)   Pulse 83   Temp 97.7 °F (36.5 °C)   Resp 16   Ht 5' 10\" (1.778 m)   Wt 157.4 kg (347 lb 0.1 oz)   SpO2 94%   BMI 49.79 kg/m²     Skin  Warm and dry. Acanthosis noted along neckline. No lipohypertrophy or lipoatrophy noted at injection sites   Heart   Regular rate and rhythm. No murmurs, rubs or gallops  Lungs  Clear to auscultation without rales or rhonchi  Extremities No foot wounds      Laboratory  No results found for this visit on 04/12/21 (from the past 12 hour(s)). No results found for this visit on 04/12/21 (from the past 12 hour(s)). All inpatient labs reviewed in full    Factors impacting BG management  Factor Dose Comments   Nutrition:  Carb-controlled meals     60 grams/meal      Drugs:  Steroids   Methylprednisolone 40mg Q8 Methylprednisolone Dosing  8mg of Methylprednisolone dose is equivalent to 0. 1units/kg NPH    16 of Methylprednisolone  dose is equivalent to    0.2units/kg NPH  24 of Methylprednisolone  dose is equivalent to 0.3units/kg NPH    36+ of Methylprednisolone  dose is equivalent to 0.4units/kg NPH   Infection/COVID-19  Hyperglycemia in the setting of COVID-19 infection is associated with a blunted immune response and delayed recovery. The subcutaneous insulin order set can be initiated to optimize his blood glucose control in the impatient setting. Blood glucose pattern        Assessment and Plan   Nursing Diagnosis Risk for unstable blood glucose pattern   Nursing Intervention Domain 5250 Decision-making Support   Nursing Interventions Examined current inpatient diabetes control   Explored factors facilitating and impeding inpatient management  Identified self-management practices impeding diabetes control  Explored corrective strategies with patient and responsible inpatient provider   Informed patient of rational for insulin strategy while hospitalized     Evaluation   This  gentleman, with Type 2 diabetes, on very high dose insulin via Medtronic MiniMed insulin pump did not achieve diabetes control prior to admission, as evidenced by admission BG of 397 and A1c of 9.3%. During this hospitalization, his insulin pump has been resumed and the patient has achieved inpatient blood glucose target of 100-180mg/dl requiring up to 350 units of insulin/day. Several factors have played a role in blood glucose management including:  [x] Critical nature of illness state  [x] Glucocorticoid use    The Subcutaneous Insulin Order set (4176) has not been in use but insulin pump initiated. The PTA pump settings and additional bolus for steroid use is optimally controlling blood glucose. Glucose control will assist in recovery of COVID-19 pneumonia. As steroids are weaned, patient will need to be notified of dose and time changes for insulin pump adjustments. Recommendations   1.  Patient awake and able to independently utilize insulin pump at PTA insulin settings at this time. Patient told to notify RN of ALL insulin boluses. 2. Agree for patient to bolus 25 units with each dose of 40 mg Solumedrol. Added order in EMR: patient to administer 25 units humalog off insulin pump for each 40 mg solumedrol dose. Please notify patient of steroid dose changes    For each dose of methylprednisolone Dosing, patient to give additional insulin bolus off pump:  8mg-15mg  of Methylprednisolone= 6 units Humalog  16-23mg  of Methylprednisolone= 13 units Humalog  24mg-35mg  of Methylprednisolone= 19 units Humalog  36+ of Methylprednisolone  Dose= 25 units Humalog    3. POC glucose ACHS. 4. Patient to bolus off the pump for all carbohydrate intake    5. Patient to enter blood glucose into pump to deliver additional correctional insulin ACHS. 6. RN to document all insulin doses given to patient off insulin pump in EMR (under LDA flow sheet)        If insulin pump needs to be removed (with current steroid order- solumedrol 40mg Q6) back-up settings are:  Basal: 75 units NPH Q12 (PLUS additional 25 units for each 40 mg Solu-medrol dose)  Bolus: 1 unit Humalog for every 3 grams Carbs  Correctional: 1 unit Humalog for every 10 points over 200  Billing Code(s)   [x] 24619       Before making these care recommendations, I personally reviewed the hosptialization record, including laboratory and diagnostic data, medications and examined the patient at bedside (circumstances permitting).   Total minutes: 21    JOSÉ MIGUEL Murcia  Diabetes Clinical Nurse Specialist  [facility]  Access via [employer]

## 2021-04-20 NOTE — PROGRESS NOTES
04/20/21 0142   CPAP/BIPAP   Device Mode CPAP   $$ CPAP Daily Yes   Bio-Med ID # 1   Mask Type and Size Large; Full face   Skin Condition WNL   PIP Observed 11 cm H20   EPAP (cm H2O) 10 cm H2O   Vt Spont (ml) 568 ml   Ve Observed (l/min) 12 l/min   Total RR (Spontaneous) 21 breaths per minute   Leak (Estimated) 45 L/min  (pt has beard) no

## 2021-04-20 NOTE — PROGRESS NOTES
Pulmonary, Critical Care, and Sleep Medicine~Progress Note    Name: Susana Buckley MRN: [id number]   : 1975 Hospital: [facility] [address]    Date: 2021 11:33 AM Admission: 2021     Impression Plan   1. COVID 19 + PNA, B + blood type. Vit d deficient   2. Diaerrha, suspect due to above, improving    3. Elevated liver enzymes, improving. Suspected to be induced by COVID19  4. SHALOM, on home CPAP  5. DM II  6. HLD 1. Monitoring inflammatory markers   2. D dimer, probnp to be monitored    3. Cpap at night  4. Chest film    5. Solu medrol reduce   6. O2 titration above 90% ; on midflow   7. S/p actemra on   8. Diuresis daily  9. lovenox bid dosing      Daily Progression:      On midflow still  Nasal congestion a little better today       On midflow  D dimer 1.57  CRP 0.66  probnp 31  LFTs up a little       Chest film looks a little better  O2 saturations are low  D dimer 0.57  CRP 3.51  probnp 49    4/15  0.51 d dimer   CRP 6.8  probnp 64  Feeling better following actemra    Nasal congestion noted today       Now requiring O2  Feels ok; wore NIV overnight  D dimer 0.42  CRP 9.93       Consult Note requested by hospitalist     Patient presented with increased abdominal pains and diarrhea for the past 5 days. No overt pulmonary complaints were noted. Currently on room air. No pervious pulmonary complaints noted. Still spiking fevers, but feeling better. I have reviewed the labs and previous days notes. Pertinent items are noted in HPI. Past Medical History:   Diagnosis Date    DM (diabetes mellitus) ([facility] [address] Age 29    HLD (hyperlipidaemia)     HTN       Past Surgical History:   Procedure Laterality Date    HX ORTHOPAEDIC Left 1998    pins in toes of foot    HX REFRACTIVE SURGERY        Prior to Admission medications    Medication Sig Start Date End Date Taking?  Authorizing Provider   insulin pump (PATIENT SUPPLIED) misc by SubCUTAneous route continuous. Yes Provider, Historical   metFORMIN ER (GLUCOPHAGE XR) 500 mg tablet TAKE 2 TABLETS WITH BREAKFAST AND DINNER 3/31/21  Yes Randy Murray MD   atorvastatin (LIPITOR) 10 mg tablet TAKE 1 TABLET DAILY 3/3/21  Yes Jerry Contreras MD   losartan (COZAAR) 100 mg tablet TAKE 1 TABLET DAILY 20  Yes Jerry Contreras MD   chlorthalidone (HYGROTEN) 25 mg tablet TAKE 1 TABLET DAILY 20  Yes Jerry Contreras MD   multivitamin (DAILY MULTI-VITAMIN) tablet Take 1 Tab by mouth daily. Yes Provider, Historical   aspirin 81 mg tablet Take 81 mg by mouth daily. Yes Provider, Historical     Allergies   Allergen Reactions    Lisinopril Cough      Social History     Tobacco Use    Smoking status: Never Smoker    Smokeless tobacco: Never Used   Substance Use Topics    Alcohol use: Yes     Alcohol/week: 0.0 standard drinks     Comment: glass of wine 1-2 times a month      Family History   Problem Relation Age of Onset    Diabetes Mother     Heart Attack Father 61    Diabetes Maternal Grandmother     Diabetes Maternal Grandfather     Diabetes Paternal Grandmother     Diabetes Paternal Grandfather      OBJECTIVE:     Vital Signs:       Visit Vitals  BP (!) 146/91 (BP 1 Location: Right upper arm, BP Patient Position: At rest)   Pulse 83   Temp 97.7 °F (36.5 °C)   Resp 16   Ht 5' 10\" (1.778 m)   Wt 157.4 kg (347 lb 0.1 oz)   SpO2 94%   BMI 49.79 kg/m²      Temp (24hrs), Av.9 °F (36.6 °C), Min:97.6 °F (36.4 °C), Max:98.2 °F (36.8 °C)     Intake/Output:     Last shift:  07 -  1900  In: 600 [P.O.:600]  Out: -     Last 3 shifts: No intake/output data recorded.           Intake/Output Summary (Last 24 hours) at 2021 1129  Last data filed at 2021 0954  Gross per 24 hour   Intake 600 ml   Output --   Net 600 ml       Physical Exam:                                        Exam Findings Other   General: No resp distress noted, appears stated age    HEENT:  No ulcers, JVD not elevated, no cervical LAD    Chest: No pectus deformity, normal chest rise b/l    HEART:  No visible thrills    Lungs:  Normal expansion     ABD: Soft/NT, non rigid mildly distended    EXT: No cyanosis/clubbing/edema, normal peripheral pulses    Skin: No rashes or ulcers, no mottling    Neuro: A/O x 3        Medications:  Current Facility-Administered Medications   Medication Dose Route Frequency    bumetanide (BUMEX) injection 1 mg  1 mg IntraVENous DAILY    methylPREDNISolone (PF) (SOLU-MEDROL) injection 40 mg  40 mg IntraVENous Q8H    albuterol (PROVENTIL HFA, VENTOLIN HFA, PROAIR HFA) inhaler 2 Puff  2 Puff Inhalation Q4H RT    fluticasone propionate (FLONASE) 50 mcg/actuation nasal spray 2 Spray  2 Spray Both Nostrils ACB/HS    sodium chloride (OCEAN) 0.65 % nasal squeeze bottle 2 Spray  2 Spray Both Nostrils Q2H PRN    aspirin delayed-release tablet 81 mg  81 mg Oral DAILY    losartan (COZAAR) tablet 100 mg  100 mg Oral DAILY    insulin pump (PATIENT SUPPLIED)   SubCUTAneous PRN    sodium chloride (NS) flush 5-10 mL  5-10 mL IntraVENous PRN    sodium chloride (NS) flush 5-40 mL  5-40 mL IntraVENous Q8H    sodium chloride (NS) flush 5-40 mL  5-40 mL IntraVENous PRN    acetaminophen (TYLENOL) tablet 650 mg  650 mg Oral Q6H PRN    Or    acetaminophen (TYLENOL) suppository 650 mg  650 mg Rectal Q6H PRN    polyethylene glycol (MIRALAX) packet 17 g  17 g Oral DAILY PRN    promethazine (PHENERGAN) tablet 12.5 mg  12.5 mg Oral Q6H PRN    Or    ondansetron (ZOFRAN) injection 4 mg  4 mg IntraVENous Q6H PRN    cholecalciferol (VITAMIN D3) (1000 Units /25 mcg) tablet 2,000 Units  2,000 Units Oral DAILY    guaiFENesin-dextromethorphan (ROBITUSSIN DM) 100-10 mg/5 mL syrup 5 mL  5 mL Oral Q4H PRN    benzonatate (TESSALON) capsule 200 mg  200 mg Oral TID PRN    therapeutic multivitamin (THERAGRAN) tablet 1 Tab  1 Tab Oral DAILY    enoxaparin (LOVENOX) injection 40 mg  40 mg SubCUTAneous Q12H    glucose chewable tablet 16 g  4 Tab Oral PRN    dextrose (D50W) injection syrg 12.5-25 g  25-50 mL IntraVENous PRN    glucagon (GLUCAGEN) injection 1 mg  1 mg IntraMUSCular PRN       Labs:  ABG No results for input(s): PHI, PCO2I, PO2I, HCO3I, SO2I, FIO2I in the last 72 hours.      CBC Recent Labs     04/19/21  0512   WBC 15.5*   HGB 13.3   HCT 40.0      MCV 83.0   MCH 37.2        Metabolic  Panel Recent Labs     04/19/21  0512      K 4.1      CO2 26   GLU 99   BUN 28*   CREA 0.95   CA 8.7   ALB 2.8*   *        Pertinent Labs                Jordan Andres PA-C  4/20/2021

## 2021-04-20 NOTE — PROGRESS NOTES
NUTRITION  Reason for Rescreen: LOS        RECOMMENDATIONS:   None at this time  Interventions   - none at this time       Information obtained from:   patient  Past Medical History:   Diagnosis Date    DM (diabetes mellitus) ([address] Age 32    HLD (hyperlipidaemia)     HTN      Pt admitted for respiratory failure. Appetite has been good - see below. BG elevated with steroids rx. Seen by DM team with pt on insulin pump at home. Remains on midlow. Diarrhea on admit but significantly improved with just 1-2 manageable stools per day. Spoke with pt via phone d/t isolation precautions. Appetite has been good. Generally eats larger portions but eating well with balance meal trays and declining additional portions or snacks at this time.      Diet:  consistent CHO  Supplements: None  Meal Intake:   Patient Vitals for the past 168 hrs:   % Diet Eaten   04/20/21 1327 76 - 100%   04/20/21 0954 76 - 100%     Weight Changes:   Loss  Wt Readings from Last 10 Encounters:   04/19/21 157.4 kg (347 lb 0.1 oz)   10/15/20 (!) 169.6 kg (374 lb)   11/25/19 (!) 173.7 kg (383 lb)   10/21/19 (!) 165.3 kg (364 lb 6.4 oz)   09/17/19 (!) 167.8 kg (370 lb)   08/22/19 (!) 174.2 kg (384 lb)   04/19/19 (!) 172.5 kg (380 lb 3.2 oz)   02/19/19 (!) 171.9 kg (379 lb)   11/15/18 (!) 172.4 kg (380 lb)   07/11/18 (!) 165.1 kg (364 lb)     Nutrition-Related Concerns Identified:  None    Estimated Nutrition Needs:   Energy: 0538-1303NYOC(75-60FRIA/KR)  Wt used: Current(157kg)  Protein: 125-157g (0.8-1g/kg)  Wt used: Current(157kg)   Fluid: 2400     PLAN:   - Continue current diet    Will revisit per policy    Austin Squires RD [facility], Pager #[phone] or via [employer]Up

## 2021-04-20 NOTE — PROGRESS NOTES
TRANSITION OF CARE  RUR--14%  Disposition--Return home to independent functioning. Covid Positive. May need home oxygen. Transport--Son    Patient's primary family contact:    CM reviewed case with treatment team during [address]. Patient continues with oxygen,  now at mid flow at 14 l/m.

## 2021-04-20 NOTE — PROGRESS NOTES
Bedside, Verbal and Written shift change report given to Kitty Lindsey (oncoming nurse) by Chris Mckinley (offgoing nurse). Report included the following information SBAR, Kardex, ED Summary, Procedure Summary, Intake/Output, MAR, Accordion, Recent Results, Med Rec Status, Cardiac Rhythm NSR, Alarm Parameters , Pre Procedure Checklist, Procedure Verification, Quality Measures and Dual Neuro Assessment.

## 2021-04-20 NOTE — ADT AUTH CERT NOTES
Viral Illness, Acute - Care Day 8 (4/19/2021) by Shandra Rudd RN       Review Status Review Entered   Completed 4/19/2021 15:14      Criteria Review      Care Day: 8 Care Date: 4/19/2021 Level of Care: Telemetry    Guideline Day 2    Level Of Care    (X) Floor    4/19/2021 15:14:09 EDT by [employer] [place]      4/19 tele    Clinical Status    (X) * Hypotension absent    4/19/2021 15:14:09 EDT by [facility] [place]      98.2 105 126/97 90 14 l high flow o2 nc   wbc 15.5 gluc 99 100 116 180   d dimer 1.64 nt pro bnp 31 crp 0.66    (X) * No requirement for mechanical ventilation    4/19/2021 15:14:10 EDT by [employer] [place]      high flow    ( ) * Oxygenation at baseline or improved    4/19/2021 15:14:10 EDT [facility]      14 l high flow o2 nc    (X) * Mental status at baseline    4/19/2021 15:14:10 EDT by John Ellis wdl    Activity    (X) Advance activity as tolerated    4/19/2021 15:14:10 EDT by [employer] [place]      as tolerated    Routes    (X) * Oral hydration    4/19/2021 15:14:10 EDT by [facility]      po    (X) Oral or IV medications    4/19/2021 15:14:10 EDT by [facility]      po vit d 3 2000 units daily  iv solumedrol 40 mg q8h    (X) Usual diet    4/19/2021 15:14:10 [facility]      dm    Interventions    (X) Possible isolation    4/19/2021 15:14:10 EDT by [facility]      droplet plus isol    (X) Pulse oximetry    4/19/2021 15:14:10 EDT by [facility]      continuous    (X) Possible oxygen    4/19/2021 15:14:10 EDT by [facility]      14l high flow o2 nc    * Milestone   Additional Notes   4/19/21 LOC IP TELE       Per attending    Acute hypoxic respiratory failure - progressively worsening   COVID-19 virus   Diarrhea, improved, not problematic nowBilateral pneumonia    S/p IV Actemra x 1 on 4/14   He was evaluated for remdesivir but he was not a candidate for Remdesivir due to elevated LFTs, borderline time duration of symptom onset, almost close to 7-day on 4/14 when started getting hypoxic    Previous hospitalists discussed with Pulm about cPlasma - Does not seem as a potential option due to timing in the course of illness   appreciate Pulmonology input   Remain on Solu-Medrol 40mg q8h since 4/16   CXR 4/16: Mild patchy interstitial and airspace opacities with improvement   On high flow nasal cannula 40 L this a.m. saturating 92%   trail of lasix again 4/16 but no I/O to determine efficacy   continue ICMU care, albuterol MDI, Flonase   inflammatory markers:  D-dimer increasing, CRP improving.  Continue to monitor. Very high risk for deterioration, continue close monitoring   General:          Alert, cooperative, No Respiratory distress in morning, appears stated age. Donald Parrot obesity.   Sitting in chair watching TV   Lungs:             No basal crackles but reducing AE bibasilar.  No wheezing/rhonchi/rales, good AE bilaterally. Chest wall:      No tenderness  No Accessory muscle use. Heart:              Regular  rhythm,  No  murmur   No edema   Abdomen:        Soft, non-tender. Not distended.  Bowel sounds normal   Extremities:     No cyanosis.  No clubbing,  trace LE edema                           Skin turgor normal, Capillary refill normal   Psych:             Not anxious or agitated.    Neurologic:      Alert, moves all extremities, answers questions appropriately and responds to commands       Per pulmonary    Monitoring inflammatory markers    D dimer, probnp to be monitored     Cpap at night    Solu medrol might lower over the weekend    O2 titration above 90% ; on midflow    S/p actemra on 4/14   Diuresis daily   lovenox bid dosing    On midflow   D dimer 1.57   CRP 0.66   probnp 31   LFTs up a little             Viral Illness, Acute - Care Day 7 (4/18/2021) by Freida Castillo RN       Review Status Review Entered   Completed 4/19/2021 15:05      Criteria Review      Care Day: 7 Care Date: 4/18/2021 Level of Care: Telemetry Guideline Day 2    Level Of Care    (X) Floor    4/19/2021 15:05:12 EDT by Adonay Juarez      4/18 tele    Clinical Status    (X) * Hypotension absent    4/19/2021 15:05:12 EDT by Adonay Juarez      97.9 103 97/55 89%12l nc high flow increased to 14l nc  d dimer 1.52 nt pro bnp 50 crp 1.09   gluc 163 152 227 100    (X) * No requirement for mechanical ventilation    4/19/2021 15:05:12 EDT by Adonay Juarez      high flow    ( ) * Oxygenation at baseline or improved    4/19/2021 15:05:12 EDT by Adonay Juarez      high flow 14l nc    (X) * Mental status at baseline    4/19/2021 15:05:12 EDT by Adonay Juarez      AAOX3    Activity    (X) Advance activity as tolerated    4/19/2021 15:05:12 EDT by Adonay Juarez      as tolerated    Routes    (X) * Oral hydration    (X) Oral or IV medications    4/19/2021 15:05:12 EDT by Adonay Juarez      iv solumedrol 40 mg q8h   po vit d 3 2000 units daily    (X) Usual diet    4/19/2021 15:05:12 EDT by Adonay Juarez      dm diet    Interventions    (X) Possible isolation    4/19/2021 15:05:12 EDT by Adonay Juarez      droplet plus isol    (X) Pulse oximetry    4/19/2021 15:05:12 EDT by Adonay Juarez      continuous    (X) Possible oxygen    4/19/2021 15:05:12 EDT by Bailee Cho high flow o2 nc    * Milestone   Additional Notes   4/18/21 LOC IP TELE       Per attending    Acute hypoxic respiratory failure - progressively worsening   COVID-19 virus   Diarrhea, improved, not problematic now   Bilateral pneumonia    S/p IV Actemra x 1 on 4/14   Not a candidate for Remdesivir due to elevated LFTs, borderline time duration of symptom onset, almost close to 7-day on 4/14 when started getting hypoxic    trend Inflammatory markers   D/w Pulm about cPlasma - Does not seem as a potential option due to timing in the course of illnessappreciate Pulmonology input   changed to high-dose IV Solu-Medrol 4/13 - remains on 40mg q8h since 4/16   CXR 4/16: Mild patchy interstitial and airspace opacities with improvement   O2 demands stable on 12L O2   trail of lasix again 4/16 but no I/O to determine efficacy   continue ICMU care, albuterol MDI, Flonase   inflammatory markers:  D-dimer increasing, CRP improving.  Continue to monitor. DM2 with hyperglycemia, on insulin pump with Humalog.     -Significant insulin resistant.     DM education team on board and patient is managing BS with his pump    control improved and this morning it is 152   HTN, controlled    restarted losartan. chlorthalidone on hold    Hyponatremia, related to dehydration - resolved    Mild MARYCARMEN - Resolved    Transaminases, likely viral syndrome related, will monitor in am   HLD- hold statin due to elevated LFTs   SHALOM on CPAP at night. Tolerating well     High risk for rapid decompensation.     General:          Alert, cooperative, No Respiratory distress in morning, appears stated age. Mercie Avery obesity.  Lying in bed on bipap. Lungs:             No basal crackles but reducing AE bibasilar.  No wheezing/rhonchi/rales, good AE bilaterally. Chest wall:      No tenderness  No Accessory muscle use. Heart:              Regular  rhythm,  No  murmur   No edema   Abdomen:        Soft, non-tender. Not distended.  Bowel sounds normal   Extremities:     No cyanosis.  No clubbing,  trace LE edema                           Skin turgor normal, Capillary refill normal   Psych:             Not anxious or agitated.    Neurologic:      Alert, moves all extremities, answers questions appropriately and responds to commands       Viral Illness, Acute - Care Day 6 (4/17/2021) by King Amos RN       Review Status Review Entered   Completed 4/19/2021 14:59      Criteria Review      Care Day: 6 Care Date: 4/17/2021 Level of Care: Telemetry    Guideline Day 2    Level Of Care    (X) Floor    4/19/2021 14:55:43 EDT by Elsie Stanley      4/17 tele    Clinical Status    (X) * Hypotension absent    4/19/2021 14:55:43 EDT by Bernardino Love      98.7 105 130/68 24 90% high flow o2 12 l min nc  alt 184 ast 58 crp 1.85 nt pro bnp 39 d dimer 0.70 gluc 220,  223, 187, 156 230 ,210, 256    (X) * No requirement for mechanical ventilation    4/19/2021 14:55:43 EDT by Bernardino Love      high flow o2    ( ) * Oxygenation at baseline or improved    4/19/2021 14:55:43 EDT by Bernardino Love      92 % 12l hihg flow nc    (X) * Mental status at baseline    Activity    (X) Advance activity as tolerated    4/19/2021 14:55:43 EDT by Bernardino Love      activity as tolerated    Routes    (X) * Oral hydration    (X) Oral or IV medications    4/19/2021 14:59:58 EDT by Bernardino Love      iv solumedrol 40 mg q8h    4/19/2021 14:55:43 EDT by Bernardino Love      po vit d3 2000 units daily   iv solumedrol 40 mg q8h    (X) Usual diet    4/19/2021 14:55:43 EDT by Bernardino Love      dm diet    Interventions    (X) Possible isolation    4/19/2021 14:55:43 EDT by Bernardino Love      droplet plus isol    (X) Pulse oximetry    4/19/2021 14:55:43 EDT by Bernardino Love      continuous    (X) Possible oxygen    4/19/2021 14:55:43 EDT by Bernardino Love      12 l high flow o2 nc    * Milestone   Additional Notes   4/17/21 LOC IP TELE    Acute hypoxic respiratory failure - progressively worsening   COVID-19 virus   Diarrhea, improved, not problematic now   Bilateral pneumonia    S/p IV Actemra x 1 on 4/14   Not a candidate for Remdesivir due to elevated LFTs, borderline time duration of symptom onset, almost close to 7-day on 4/14 when started getting hypoxic    trend Inflammatory markers   D/w Pulm about cPlasma - Does not seem as a potential option due to timing in the course of illness   appreciate Pulmonology input   changed to high-dose IV Solu-Medrol 4/13 - weaned down to q8h on 4/16CXR 4/16: Mild patchy interstitial and airspace opacities with improvement   O2 demands increased slightly now on 12L O2   trail of lasix again 4/16 but no I/O to determine efficacy   continue ICMU care, albuterol MDI, Flonase   inflammatory markers:  D-dimer increased, CRP improved. DM2 with hyperglycemia, on insulin pump with Humalog.     Significant insulin resistant.     Poorly controlled due to steroids   DM education team on board and patient is managing BS with his pump   HTN, controlled   restarted losartan. chlorthalidone on hold    Hyponatremia, related to dehydration - resolved   Mild MARYCARMEN - Resolved    Transaminases, likely viral syndrome related, will monitor    HLD- hold statin due to elevated LFTs   SHALOM on CPAP at night. Tolerating well   High risk for rapid decompensation.     Code status: Full code   DVT prophylaxis: Lovenox SQ per COVID-19 protocol   Care Plan discussed with: patient   Anticipated Disposition: Home w/Family   Anticipated Discharge: Greater than 48 hours   General:          Alert, cooperative, No Respiratory distress in morning, appears stated age. Rufina Paci obesity.     Neck:               Supple, symmetrical   Lungs:             No basal crackles but reducing AE bibasilar.  No wheezing/rhonchi/rales   Chest wall:      No tenderness  No Accessory muscle use. Heart:              Regular  rhythm,  No  murmur   No edema   Abdomen:        Soft, non-tender. Not distended.  Bowel sounds normal   Extremities:     No cyanosis.  No clubbing,  trace LE edema                           Skin turgor normal, Capillary refill normal   Skin:                Not pale.  Not Jaundiced  No rashes    Psych:             Not anxious or agitated.    Neurologic:      Alert, moves all extremities, answers questions appropriately and responds to commands       Albuterol nebs q4h    Lovenox sc 40 mg q12h    Insulin pump pt managing

## 2021-04-21 ENCOUNTER — APPOINTMENT (OUTPATIENT)
Dept: GENERAL RADIOLOGY | Age: 46
DRG: 871 | End: 2021-04-21
Attending: PHYSICIAN ASSISTANT
Payer: COMMERCIAL

## 2021-04-21 LAB
ANION GAP SERPL CALC-SCNC: 6 MMOL/L (ref 5–15)
BNP SERPL-MCNC: 27 PG/ML
BUN SERPL-MCNC: 28 MG/DL (ref 6–20)
BUN/CREAT SERPL: 30 (ref 12–20)
CALCIUM SERPL-MCNC: 8.5 MG/DL (ref 8.5–10.1)
CHLORIDE SERPL-SCNC: 104 MMOL/L (ref 97–108)
CO2 SERPL-SCNC: 27 MMOL/L (ref 21–32)
CREAT SERPL-MCNC: 0.92 MG/DL (ref 0.7–1.3)
CRP SERPL-MCNC: <0.29 MG/DL (ref 0–0.6)
D DIMER PPP FEU-MCNC: 1.3 MG/L FEU (ref 0–0.65)
ERYTHROCYTE [DISTWIDTH] IN BLOOD BY AUTOMATED COUNT: 12.3 % (ref 11.5–14.5)
GLUCOSE BLD STRIP.AUTO-MCNC: 112 MG/DL (ref 65–100)
GLUCOSE BLD STRIP.AUTO-MCNC: 142 MG/DL (ref 65–100)
GLUCOSE BLD STRIP.AUTO-MCNC: 142 MG/DL (ref 65–100)
GLUCOSE BLD STRIP.AUTO-MCNC: 172 MG/DL (ref 65–100)
GLUCOSE BLD STRIP.AUTO-MCNC: 179 MG/DL (ref 65–100)
GLUCOSE BLD STRIP.AUTO-MCNC: 64 MG/DL (ref 65–100)
GLUCOSE SERPL-MCNC: 52 MG/DL (ref 65–100)
HCT VFR BLD AUTO: 43.2 % (ref 36.6–50.3)
HGB BLD-MCNC: 14.3 G/DL (ref 12.1–17)
MCH RBC QN AUTO: 27.9 PG (ref 26–34)
MCHC RBC AUTO-ENTMCNC: 33.1 G/DL (ref 30–36.5)
MCV RBC AUTO: 84.2 FL (ref 80–99)
NRBC # BLD: 0 K/UL (ref 0–0.01)
NRBC BLD-RTO: 0 PER 100 WBC
PLATELET # BLD AUTO: 439 K/UL (ref 150–400)
PMV BLD AUTO: 9.1 FL (ref 8.9–12.9)
POTASSIUM SERPL-SCNC: 4.3 MMOL/L (ref 3.5–5.1)
RBC # BLD AUTO: 5.13 M/UL (ref 4.1–5.7)
SERVICE CMNT-IMP: ABNORMAL
SODIUM SERPL-SCNC: 137 MMOL/L (ref 136–145)
WBC # BLD AUTO: 20.9 K/UL (ref 4.1–11.1)

## 2021-04-21 PROCEDURE — 82962 GLUCOSE BLOOD TEST: CPT

## 2021-04-21 PROCEDURE — 86140 C-REACTIVE PROTEIN: CPT

## 2021-04-21 PROCEDURE — 99233 SBSQ HOSP IP/OBS HIGH 50: CPT | Performed by: CLINICAL NURSE SPECIALIST

## 2021-04-21 PROCEDURE — 80048 BASIC METABOLIC PNL TOTAL CA: CPT

## 2021-04-21 PROCEDURE — 83880 ASSAY OF NATRIURETIC PEPTIDE: CPT

## 2021-04-21 PROCEDURE — 94640 AIRWAY INHALATION TREATMENT: CPT

## 2021-04-21 PROCEDURE — 74011250637 HC RX REV CODE- 250/637: Performed by: INTERNAL MEDICINE

## 2021-04-21 PROCEDURE — 74011250636 HC RX REV CODE- 250/636: Performed by: INTERNAL MEDICINE

## 2021-04-21 PROCEDURE — 94660 CPAP INITIATION&MGMT: CPT

## 2021-04-21 PROCEDURE — 74011000250 HC RX REV CODE- 250: Performed by: PHYSICIAN ASSISTANT

## 2021-04-21 PROCEDURE — 74011250636 HC RX REV CODE- 250/636: Performed by: PHYSICIAN ASSISTANT

## 2021-04-21 PROCEDURE — 65660000000 HC RM CCU STEPDOWN

## 2021-04-21 PROCEDURE — 85379 FIBRIN DEGRADATION QUANT: CPT

## 2021-04-21 PROCEDURE — 85027 COMPLETE CBC AUTOMATED: CPT

## 2021-04-21 PROCEDURE — 71045 X-RAY EXAM CHEST 1 VIEW: CPT

## 2021-04-21 PROCEDURE — 36415 COLL VENOUS BLD VENIPUNCTURE: CPT

## 2021-04-21 PROCEDURE — 77010033678 HC OXYGEN DAILY

## 2021-04-21 PROCEDURE — 94664 DEMO&/EVAL PT USE INHALER: CPT

## 2021-04-21 RX ORDER — PREDNISONE 20 MG/1
40 TABLET ORAL
Status: DISCONTINUED | OUTPATIENT
Start: 2021-04-22 | End: 2021-04-23 | Stop reason: HOSPADM

## 2021-04-21 RX ADMIN — LOSARTAN POTASSIUM 100 MG: 50 TABLET, FILM COATED ORAL at 09:53

## 2021-04-21 RX ADMIN — Medication 10 ML: at 21:37

## 2021-04-21 RX ADMIN — Medication 10 ML: at 06:07

## 2021-04-21 RX ADMIN — ALBUTEROL SULFATE 2 PUFF: 90 AEROSOL, METERED RESPIRATORY (INHALATION) at 00:25

## 2021-04-21 RX ADMIN — ASPIRIN 81 MG: 81 TABLET, COATED ORAL at 09:53

## 2021-04-21 RX ADMIN — BUMETANIDE 1 MG: 0.25 INJECTION INTRAMUSCULAR; INTRAVENOUS at 09:53

## 2021-04-21 RX ADMIN — ALBUTEROL SULFATE 2 PUFF: 90 AEROSOL, METERED RESPIRATORY (INHALATION) at 17:33

## 2021-04-21 RX ADMIN — ENOXAPARIN SODIUM 40 MG: 40 INJECTION SUBCUTANEOUS at 06:07

## 2021-04-21 RX ADMIN — METHYLPREDNISOLONE SODIUM SUCCINATE 40 MG: 40 INJECTION, POWDER, FOR SOLUTION INTRAMUSCULAR; INTRAVENOUS at 21:35

## 2021-04-21 RX ADMIN — Medication 10 ML: at 06:06

## 2021-04-21 RX ADMIN — THERA TABS 1 TABLET: TAB at 09:53

## 2021-04-21 RX ADMIN — FLUTICASONE PROPIONATE 2 SPRAY: 50 SPRAY, METERED NASAL at 06:08

## 2021-04-21 RX ADMIN — ALBUTEROL SULFATE 2 PUFF: 90 AEROSOL, METERED RESPIRATORY (INHALATION) at 11:27

## 2021-04-21 RX ADMIN — Medication 10 ML: at 17:27

## 2021-04-21 RX ADMIN — ENOXAPARIN SODIUM 40 MG: 40 INJECTION SUBCUTANEOUS at 17:27

## 2021-04-21 RX ADMIN — Medication 2000 UNITS: at 09:53

## 2021-04-21 RX ADMIN — ALBUTEROL SULFATE 2 PUFF: 90 AEROSOL, METERED RESPIRATORY (INHALATION) at 08:28

## 2021-04-21 RX ADMIN — METHYLPREDNISOLONE SODIUM SUCCINATE 40 MG: 40 INJECTION, POWDER, FOR SOLUTION INTRAMUSCULAR; INTRAVENOUS at 09:53

## 2021-04-21 NOTE — PROGRESS NOTES
Pulmonary, Critical Care, and Sleep Medicine~Progress Note    Name: Sterling Morton MRN: [id number]   : 1975 Hospital: [facility]    Date: 2021 11:33 AM Admission: 2021     Impression Plan   1. COVID 19 + PNA, B + blood type. Vit d deficient   2. Diaerrha, suspect due to above, improving    3. Elevated liver enzymes, improving. Suspected to be induced by COVID19  4. SHALOM, on home CPAP  5. DM II  6. HLD 1. Monitoring inflammatory markers   2. D dimer, probnp to be monitored    3. Cpap at night  4. Chest film    5. Solu medrol reduce to PO  6. O2 titration above 90% ; on midflow   7. S/p actemra on   8. Diuresis daily  9. lovenox bid dosing      Daily Progression:      Less congested   Feeling better  O2 requirements are down       On midflow still  Nasal congestion a little better today       On midflow  D dimer 1.57  CRP 0.66  probnp 31  LFTs up a little       Chest film looks a little better  O2 saturations are low  D dimer 0.57  CRP 3.51  probnp 49    4/15  0.51 d dimer   CRP 6.8  probnp 64  Feeling better following actemra    Nasal congestion noted today       Now requiring O2  Feels ok; wore NIV overnight  D dimer 0.42  CRP 9.93       Consult Note requested by hospitalist     Patient presented with increased abdominal pains and diarrhea for the past 5 days. No overt pulmonary complaints were noted. Currently on room air. No pervious pulmonary complaints noted. Still spiking fevers, but feeling better. I have reviewed the labs and previous days notes. Pertinent items are noted in HPI. Past Medical History:   Diagnosis Date    DM (diabetes mellitus) ([facility] [address] Age 29    HLD (hyperlipidaemia)     HTN       Past Surgical History:   Procedure Laterality Date    HX ORTHOPAEDIC Left 1998    pins in toes of foot    HX REFRACTIVE SURGERY        Prior to Admission medications    Medication Sig Start Date End Date Taking? Authorizing Provider   insulin pump [place] [facility] [place] SubCUT[place] route continuous. Yes Provider, Historical   metFORMIN ER (GLUCOPHAGE XR) 500 mg tablet TAKE 2 TABLETS WITH BREAKFAST AND DINNER 3/31/21  Yes Harsha Block MD   atorvastatin (LIPITOR) 10 mg tablet TAKE 1 TABLET DAILY 3/3/21  Yes Nilsa Michael MD   losartan (COZAAR) 100 mg tablet TAKE 1 TABLET DAILY 20  Yes Nilsa Michael MD   chlorthalidone (HYGROTEN) 25 mg tablet TAKE 1 TABLET DAILY 20  Yes Nilsa Michael MD   multivitamin (DAILY MULTI-VITAMIN) tablet Take 1 Tab by mouth daily. Yes Provider, Historical   aspirin 81 mg tablet Take 81 mg by mouth daily. Yes Provider, Historical     Allergies   Allergen Reactions    Lisinopril Cough      Social History     Tobacco Use    Smoking status: Never Smoker    Smokeless tobacco: Never Used   Substance Use Topics    Alcohol use:  Yes     Alcohol/week: 0.0 standard drinks     Comment: glass of wine 1-2 times a month      Family History   Problem Relation Age of Onset    Diabetes Mother     Heart Attack Father 61    Diabetes Maternal Grandmother     Diabetes Maternal Grandfather     Diabetes Paternal Grandmother     Diabetes Paternal Grandfather      OBJECTIVE:     Vital Signs:       Visit Vitals  /78 (BP 1 Location: Right upper arm, BP Patient Position: At rest)   Pulse (!) 118   Temp 97.9 °F (36.6 °C)   Resp 24   Ht 5' 10\" (1.778 m)   Wt 158.1 kg (348 lb 8.8 oz)   SpO2 95%   BMI 50.01 kg/m²      Temp (24hrs), Av.7 °F (36.5 °C), Min:97.3 °F (36.3 °C), Max:97.9 °F (36.6 °C)     Intake/Output:     Last shift:  07 - 1900  In: 800 [P.O.:800]  Out: -     Last 3 shifts: 1901 -  0700  In: 600 [P.O.:600]  Out: -           Intake/Output Summary (Last 24 hours) at 2021 1137  Last data filed at 2021 0949  Gross per 24 hour   Intake 800 ml   Output --   Net 800 ml       Physical Exam:                                        Exam Findings Other   General: No resp distress noted, appears stated age    [de-identified]:  No ulcers, JVD not elevated, no cervical LAD    Chest: No pectus deformity, normal chest rise b/l    HEART:  No visible thrills    Lungs:  Normal expansion     ABD: Soft/NT, non rigid mildly distended    EXT: No cyanosis/clubbing/edema, normal peripheral pulses    Skin: No rashes or ulcers, no mottling    Neuro: A/O x 3        Medications:  Current Facility-Administered Medications   Medication Dose Route Frequency    methylPREDNISolone (PF) (SOLU-MEDROL) injection 40 mg  40 mg IntraVENous Q12H    bumetanide (BUMEX) injection 1 mg  1 mg IntraVENous DAILY    albuterol (PROVENTIL HFA, VENTOLIN HFA, PROAIR HFA) inhaler 2 Puff  2 Puff Inhalation Q4H RT    fluticasone propionate (FLONASE) 50 mcg/actuation nasal spray 2 Spray  2 Spray Both Nostrils ACB/HS    sodium chloride (OCEAN) 0.65 % nasal squeeze bottle 2 Spray  2 Spray Both Nostrils Q2H PRN    aspirin delayed-release tablet 81 mg  81 mg Oral DAILY    losartan (COZAAR) tablet 100 mg  100 mg Oral DAILY    insulin pump (PATIENT SUPPLIED)   SubCUTAneous PRN    sodium chloride (NS) flush 5-10 mL  5-10 mL IntraVENous PRN    sodium chloride (NS) flush 5-40 mL  5-40 mL IntraVENous Q8H    sodium chloride (NS) flush 5-40 mL  5-40 mL IntraVENous PRN    acetaminophen (TYLENOL) tablet 650 mg  650 mg Oral Q6H PRN    Or    acetaminophen (TYLENOL) suppository 650 mg  650 mg Rectal Q6H PRN    polyethylene glycol (MIRALAX) packet 17 g  17 g Oral DAILY PRN    promethazine (PHENERGAN) tablet 12.5 mg  12.5 mg Oral Q6H PRN    Or    ondansetron (ZOFRAN) injection 4 mg  4 mg IntraVENous Q6H PRN    cholecalciferol (VITAMIN D3) (1000 Units /25 mcg) tablet 2,000 Units  2,000 Units Oral DAILY    guaiFENesin-dextromethorphan (ROBITUSSIN DM) 100-10 mg/5 mL syrup 5 mL  5 mL Oral Q4H PRN    benzonatate (TESSALON) capsule 200 mg  200 mg Oral TID PRN    therapeutic multivitamin (THERAGRAN) tablet 1 Tab  1 Tab Oral DAILY    enoxaparin (LOVENOX) injection 40 mg  40 mg SubCUTAneous Q12H    glucose chewable tablet 16 g  4 Tab Oral PRN    dextrose (D50W) injection syrg 12.5-25 g  25-50 mL IntraVENous PRN    glucagon (GLUCAGEN) injection 1 mg  1 mg IntraMUSCular PRN       Labs:  ABG No results for input(s): PHI, PCO2I, PO2I, HCO3I, SO2I, FIO2I in the last 72 hours.      CBC Recent Labs     04/21/21 0418 04/19/21  0512   WBC 20.9* 15.5*   HGB 14.3 13.3   HCT 43.2 40.0   * 336   MCV 84.2 83.0   MCH 27.9 82.9        Metabolic  Panel Recent Labs     04/21/21 0418 04/19/21  0512    139   K 4.3 4.1    105   CO2 27 26   GLU 52* 99   BUN 28* 28*   CREA 0.92 0.95   CA 8.5 8.7   ALB  --  2.8*   ALT  --  168*        Pertinent Labs                Jordan Alvarado PA-C  4/21/2021

## 2021-04-21 NOTE — PROGRESS NOTES
Problem: Diabetes Self-Management  Goal: *Using medications safely  Description: State effect of diabetes medications on diabetes; name diabetes medication taking, action and side effects. Outcome: Progressing Towards Goal  Goal: *Monitoring blood glucose, interpreting and using results  Description: Identify recommended blood glucose targets  and personal targets. Outcome: Progressing Towards Goal     Problem: Falls - Risk of  Goal: *Absence of Falls  Description: Document Niru Osman Fall Risk and appropriate interventions in the flowsheet.   Outcome: Progressing Towards Goal  Note: Fall Risk Interventions:     Medication Interventions: Evaluate medications/consider consulting pharmacy   Problem: Breathing Pattern - Ineffective  Goal: *Absence of hypoxia  Outcome: Progressing Towards Goal  Note: Weening O2 as tolerated

## 2021-04-21 NOTE — DIABETES MGMT
[id number]    CLINICAL NURSE SPECIALIST CONSULT   FOLLOW UP NOTE    Initial Presentation   Phillip Ramsey is a 39 y.o. male who presents to the ED 4/12/21 with a 4 day complaint of diarrhea with abdominal pain. He had a rapid COVID-19 test which was positive    HX:   Past Medical History:   Diagnosis Date    DM (diabetes mellitus) ([facility] [address] Age 32    HLD (hyperlipidaemia)     HTN         DX: Gastritis, COVID-19 pneumonia    TX: IV steroids/IV antibiotics     Hospital course   Clinical progress has been uncomplicated. Diabetes    Patient has known Type 2 diabetes, treated with Metformin and Humalog PTA. Family history positive for diabetes: Mother with Type 2 Diabetes     Admission  and A1c 9.3% indicate poor diabetes control. Ambulatory blood glucose management provided by endocrinologist: Elodia Ha MD with [facility].     Consulted by Rosella Baumgarten, MD for advanced diabetes nursing assessment and care, specifically related to    [x] Inpatient management strategy      Diabetes-related medical history  Acute complications: Hyperglycemia  Neurological complications  Peripheral neuropathy  Microvascular disease: None  Macrovascular disease: None      Diabetes medication history  Drug class Currently in use Discontinued Never used   Biguanide Metformin 500mg at breakfast and dinner     DDP-4 inhibitor       Sulfonylurea      Thiazolidinedione      GLP-1 RA      SGLT-2 inhibitors      Basal insulin      Bolus insulin      Fixed Dose  Combinations Insulin pump via settings below       Pump settings:  - basal: 12a: 6.35 units/hr   - Carb ratio: 1:3  - sensitivity: 10  - target: 100-120  - active insulin time: 3 hr  Subjective   On 10L O2 with humidification  A1C 9.4%  Blood cultures with NGTD  Continued on methylpred 40mg weaned to Q12  CRP very low- less than 0.29  S/p actremra 4/14    Continues on insulin pump  Fasting glucose 64  24 hr glucose pattern:   Basal rate: 6.35 units/hr      4/20/21:  652a: 24.6 units with Solu-Medrol  9a: 20 units for breakfast  1246:  23.05 units for lunch  133 11 units for correctional  336 9 units for snack  504 , 10.25 units for correction  534 25 units for dinner  928 25 units with Solu-Medrol   1054 75 cho      4/20/21:  855 16.65 units for breakfast  Objective   Physical exam  General Alert, oriented and in no acute distress/ill-appearing. Conversant and cooperative. Vital Signs   Visit Vitals  /78 (BP 1 Location: Right upper arm, BP Patient Position: At rest)   Pulse (!) 118   Temp 97.9 °F (36.6 °C)   Resp 24   Ht 5' 10\" (1.778 m)   Wt 158.1 kg (348 lb 8.8 oz)   SpO2 94%   BMI 50.01 kg/m²     Skin  Warm and dry. Acanthosis noted along neckline. No lipohypertrophy or lipoatrophy noted at injection sites   Heart   Regular rate and rhythm. No murmurs, rubs or gallops  Lungs  Clear to auscultation without rales or rhonchi  Extremities No foot wounds      Laboratory      CBC W/O DIFF    Collection Time: 04/21/21  4:18 AM   Result Value Ref Range    WBC 20.9 (H) 4.1 - 11.1 K/uL    RBC 5.13 4.10 - 5.70 M/uL    HGB 14.3 12.1 - 17.0 g/dL    HCT 43.2 36.6 - 50.3 %    MCV 84.2 80.0 - 99.0 FL    MCH 27.9 26.0 - 34.0 PG    MCHC 33.1 30.0 - 36.5 g/dL    RDW 12.3 11.5 - 14.5 %    PLATELET 938 (H) 452 - 400 K/uL    MPV 9.1 8.9 - 12.9 FL    NRBC 0.0 0  WBC    ABSOLUTE NRBC 0.00 0.00 - 0.01 K/uL     No results found for this visit on 04/12/21 (from the past 12 hour(s)).     All inpatient labs reviewed in full    Factors impacting BG management  Factor Dose Comments   Nutrition:  Carb-controlled meals     60 grams/meal      Drugs:  Steroids   Methylprednisolone 40mg Q12 Methylprednisolone Dosing  8mg of Methylprednisolone dose is equivalent to     36+ of Methylprednisolone  dose is equivalent to 0.4units/kg NPH     Infection/COVID-19  Hyperglycemia in the setting of COVID-19 infection is associated with a blunted immune response and delayed recovery. The subcutaneous insulin order set can be initiated to optimize his blood glucose control in the impatient setting. Blood glucose pattern        Assessment and Plan   Nursing Diagnosis Risk for unstable blood glucose pattern   Nursing Intervention Domain 5252 Decision-making Support   Nursing Interventions Examined current inpatient diabetes control   Explored factors facilitating and impeding inpatient management  Identified self-management practices impeding diabetes control  Explored corrective strategies with patient and responsible inpatient provider   Informed patient of rational for insulin strategy while hospitalized     Evaluation   This  gentleman, with Type 2 diabetes, on very high dose insulin via Medtronic MiniMed insulin pump did not achieve diabetes control prior to admission, as evidenced by admission BG of 397 and A1c of 9.3%. During this hospitalization, his insulin pump has been resumed and the patient has nearly achieved inpatient blood glucose target of 100-180mg/dl requiring up to 350 units of insulin/day. Several factors have played a role in blood glucose management including:  [x] Critical nature of illness state  [x] Glucocorticoid use    The Subcutaneous Insulin Order set (0250) has not been in use but insulin pump initiated. The PTA pump settings and additional 25 unit bolus per 40 mg Solumedrol is largely controlling blood glucose. Now that solu-medrol is being weaned and there is a prolonged period between doses- glucose will rise for 12 hrs-15 hrs with each dose. He would benefit from a 12hr increase in basal rate as opposed to a bolus of humalog (only lasting 4 hrs) As steroids are weaned, patient will need to be notified of dose and time changes for insulin pump adjustments. Recommendations   1. Patient awake and able to independently utilize insulin pump at PTA insulin settings at this time.   Patient told to notify RN of ALL insulin boluses. 2. Agree for patient to get additional 25 units with each dose of 40 mg Solumedrol. Increased basal rate by 2 units for 12 hrs with each Solu-Medrol dose. Please notify patient of steroid dose changes or dose being held    Increased basal rate from 9a to 9p by 2 units/hr    3. POC glucose ACHS. 4. Patient to bolus off the pump for all carbohydrate intake    5. Patient to enter blood glucose into pump to deliver additional correctional insulin ACHS. 6. RN to document all insulin doses given to patient off insulin pump in EMR (under LDA flow sheet)        If insulin pump needs to be removed (with current steroid order- solumedrol 40mg Q6) back-up settings are:  Basal: 75 units NPH Q12 (PLUS additional 25 units for each 40 mg Solu-medrol dose)  Bolus: 1 unit Humalog for every 3 grams Carbs  Correctional: 1 unit Humalog for every 10 points over 200  Billing Code(s)   [x] 15616      Before making these care recommendations, I personally reviewed the hosptialization record, including laboratory and diagnostic data, medications and examined the patient at bedside (circumstances permitting).   Total minutes: 28    JOSÉ MIGUEL Smith  Diabetes Clinical Nurse Specialist  [facility]  Access via NumberPicture

## 2021-04-21 NOTE — PROGRESS NOTES
Hospitalist Progress Note          Terra Ronquillo MD  Please call  and page for questions. Call physician on-call through the  7pm-7am    Daily Progress Note: 4/21/2021    Primary care provider:Janes Ashford MD    Date of admission: 4/12/2021 11:55 AM    Admission summery and hospital course:  39 y. o. male with PMH of DM 2 on insulin pump, HTN, HLD, SHALOM on CPAP at home nightly, came to ED on April 12 for evaluation of diarrhea going on for 5 days, associated nausea/ vomiting/ fever/ chills/ abdominal pain.  In the ED, patient was evaluated with COVID-19 rapid testing which turned positive. Subjective:   Patient said he is feeling better today. Patient said he is eating well. Patient has mild cough. Patient said he has good taste and has good sense of smell. Assessment/Plan:   Acute hypoxic respiratory failure - progressively worsening  COVID-19 virus  Diarrhea, improved, not problematic now  Bilateral pneumonia   -S/p IV Actemra x 1 on 4/14  -He was evaluated for remdesivir but he was not a candidate for Remdesivir due to elevated LFTs, borderline time duration of symptom onset, almost close to 7-day on 4/14 when started getting hypoxic   -Previous hospitalists discussed with Pulm about Plasma - Does not seem as a potential option due to timing in the course of illness  -appreciate Pulmonology input  -Remain on Solu-Medrol 40mg q8h since 4/16  -On high flow nasal cannula 12 L/min, improving as per RN.   - On Bumex 1 mg daily since April 20.   - continue ICMU care, albuterol MDI, Flonase  -Continue monitoring inflammatory markers including D-dimer, CRP. -Very high risk for deterioration, continue close monitoring     DM2 with hyperglycemia and hypoglycemia  Patient is on insulin pump he is managing by himself. Patient was recommended to adjust his pump according to ongoing hypoglycemia.  Diabetic teams are on board.     Hypertension, controlled  Continue losartan and follow.     Hyponatremia, related to dehydration - resolved   Mild MARYCARMEN - Resolved   Transaminases, likely viral related, improving,  continue to monitor. HLD-statins are on hold due to transaminitis  SHALOM on CPAP at night. Tolerating well.     Morbid obesity: Counseling was provided about weight loss    See orders for other plans. VTE prophylaxis: Lovenox twice daily  Code status: Full code  Discussed plan of care with Patient/Family and Nurse. Pre-admission lived at home. Discharge planning: pending. High risk of deterioration       Review of Systems:     Review of Systems:  Symptom  Y/N  Comments   Symptom  Y/N  Comments    Fever/Chills  n    Chest Pain  n    Poor Appetite   n   Edema  n     Cough  y   Abdominal Pain   n    Sputum  n   Joint Pain      SOB/NORTH  n   Pruritis/Rash      Nausea/vomit  n   Tolerating PT/OT      Diarrhea     Tolerating Diet      Constipation     Other      Could not obtain due to:         Objective:   Physical Exam:     Visit Vitals  /65 (BP 1 Location: Right upper arm, BP Patient Position: At rest;Sitting;Reclining)   Pulse (!) 102   Temp 97.9 °F (36.6 °C)   Resp 20   Ht 5' 10\" (1.778 m)   Wt 158.1 kg (348 lb 8.8 oz)   SpO2 95%   BMI 50.01 kg/m²    O2 Flow Rate (L/min): 12 l/min O2 Device: Hi flow nasal cannula    Temp (24hrs), Av.7 °F (36.5 °C), Min:97.3 °F (36.3 °C), Max:97.9 °F (36.6 °C)    701 - 1900  In: 800 [P.O.:800]  Out: -    1901 -  07  In: 600 [P.O.:600]  Out: -       General:  Alert, cooperative, no distress, appears stated age. Patient is comfortably sitting up in the chair. Lungs:   Clear to auscultation bilaterally. Heart:  Regular rate and rhythm, S1, S2 normal, no murmur. Abdomen:   Obese, soft, non-tender. Bowel sounds normal.    Extremities: Extremities normal, atraumatic, no cyanosis or edema. Neurologic:  Patient has clear voice and he talks appropriately.   Patient can move his extremities equally against gravity. Data Review:       Recent Days:  Recent Labs     04/21/21 0418 04/19/21 0512   WBC 20.9* 15.5*   HGB 14.3 13.3   HCT 43.2 40.0   * 336     Recent Labs     04/21/21 0418 04/19/21 0512    139   K 4.3 4.1    105   CO2 27 26   GLU 52* 99   BUN 28* 28*   CREA 0.92 0.95   CA 8.5 8.7   ALB  --  2.8*   ALT  --  168*     No results for input(s): PH, PCO2, PO2, HCO3, FIO2 in the last 72 hours.     24 Hour Results:  Recent Results (from the past 24 hour(s))   GLUCOSE, POC    Collection Time: 04/20/21  5:02 PM   Result Value Ref Range    Glucose (POC) 219 (H) 65 - 100 mg/dL    Performed by Kirsty Collier    GLUCOSE, POC    Collection Time: 04/20/21  5:32 PM   Result Value Ref Range    Glucose (POC) 223 (H) 65 - 100 mg/dL    Performed by Cierra farah RN    GLUCOSE, POC    Collection Time: 04/20/21  9:26 PM   Result Value Ref Range    Glucose (POC) 226 (H) 65 - 100 mg/dL    Performed by Kirsty Collier    D DIMER    Collection Time: 04/21/21  4:18 AM   Result Value Ref Range    D-dimer 1.30 (H) 0.00 - 0.65 mg/L FEU   C REACTIVE PROTEIN, QT    Collection Time: 04/21/21  4:18 AM   Result Value Ref Range    C-Reactive protein <0.29 0.00 - 0.60 mg/dL   NT-PRO BNP    Collection Time: 04/21/21  4:18 AM   Result Value Ref Range    NT pro-BNP 27 <125 PG/ML   CBC W/O DIFF    Collection Time: 04/21/21  4:18 AM   Result Value Ref Range    WBC 20.9 (H) 4.1 - 11.1 K/uL    RBC 5.13 4.10 - 5.70 M/uL    HGB 14.3 12.1 - 17.0 g/dL    HCT 43.2 36.6 - 50.3 %    MCV 84.2 80.0 - 99.0 FL    MCH 27.9 26.0 - 34.0 PG    MCHC 33.1 30.0 - 36.5 g/dL    RDW 12.3 11.5 - 14.5 %    PLATELET 743 (H) 223 - 400 K/uL    MPV 9.1 8.9 - 12.9 FL    NRBC 0.0 0  WBC    ABSOLUTE NRBC 0.00 0.00 - 0.31 K/uL   METABOLIC PANEL, BASIC    Collection Time: 04/21/21  4:18 AM   Result Value Ref Range    Sodium 137 136 - 145 mmol/L    Potassium 4.3 3.5 - 5.1 mmol/L    Chloride 104 97 - 108 mmol/L    CO2 27 21 - 32 mmol/L    Anion gap 6 5 - 15 mmol/L    Glucose 52 (LL) 65 - 100 mg/dL    BUN 28 (H) 6 - 20 MG/DL    Creatinine 0.92 0.70 - 1.30 MG/DL    BUN/Creatinine ratio 30 (H) 12 - 20      GFR est AA >60 >60 ml/min/1.73m2    GFR est non-AA >60 >60 ml/min/1.73m2    Calcium 8.5 8.5 - 10.1 MG/DL   GLUCOSE, POC    Collection Time: 04/21/21  5:30 AM   Result Value Ref Range    Glucose (POC) 64 (L) 65 - 100 mg/dL    Performed by [employer]    GLUCOSE, POC    Collection Time: 04/21/21  6:04 AM   Result Value Ref Range    Glucose (POC) 112 (H) 65 - 100 mg/dL    Performed by [employer] [facility]    GLUCOSE, POC    Collection Time: 04/21/21  8:49 AM   Result Value Ref Range    Glucose (POC) 142 (H) 65 - 100 mg/dL    Performed by [employer]    GLUCOSE, POC    Collection Time: 04/21/21 11:31 AM   Result Value Ref Range    Glucose (POC) 142 (H) 65 - 100 mg/dL    Performed by [employer]        Problem List:  Problem List as of 4/21/2021 Date Reviewed: 11/19/2020          Codes Class Noted - Resolved    Hyponatremia ICD-10-CM: E87.1  ICD-9-CM: 276.1  4/12/2021 - Present        Acute respiratory failure with hypoxemia ([facility] ICD-10-CM: J96.01  ICD-9-CM: 518.81  4/12/2021 - Present        Pneumonia due to COVID-19 virus ICD-10-CM: U07.1, J12.82  ICD-9-CM: 480.8, 079.89  4/12/2021 - Present        SHALOM on CPAP ICD-10-CM: G47.33, Z99.89  ICD-9-CM: 327.23, V46.8  11/25/2019 - Present        Obesity, morbid ([facility] ICD-10-CM: E66.01  ICD-9-CM: 278.01  1/18/2018 - Present        Retinopathy due to secondary diabetes ([facility] ICD-10-CM: E13.319  ICD-9-CM: 249.50, 362.01  11/8/2016 - Present        Diabetes mellitus due to underlying condition with mild nonproliferative diabetic retinopathy without macular edema ([facility] ICD-10-CM: J75.4591  ICD-9-CM: 249.50, 362.04  5/29/2015 - Present        Wound of right leg ICD-10-CM: A83.638F  ICD-9-CM: 891.0  5/7/2013 - Present        Cellulitis and abscess of leg ICD-10-CM: L03.119, L02.419  ICD-9-CM: 682.6  5/7/2013 - Present        DM [facility]di[facility] mellitus[facility] ICD-10-CM: E11.9  ICD-9-CM: 250.00  6/1/2010 - Present    Overview Addendum 11/15/2018  8:54 AM by Alberto Zafar MD     Pump basal  6.2  Up basal to 4 units at  Night, up to 5.2 (1/2015)    Bolus  20 to 25   Effective 5/15   15 to 20             HLD (hyperlipidemia) ICD-10-CM: E78.5  ICD-9-CM: 272.4  6/1/2010 - Present        HTN (hypertension) ICD-10-CM: I10  ICD-9-CM: 401.9  6/1/2010 - Present              Medications reviewed  Current Facility-Administered Medications   Medication Dose Route Frequency    [START ON 4/22/2021] predniSONE (DELTASONE) tablet 40 mg  40 mg Oral DAILY WITH BREAKFAST    methylPREDNISolone (PF) (SOLU-MEDROL) injection 40 mg  40 mg IntraVENous Q12H    bumetanide (BUMEX) injection 1 mg  1 mg IntraVENous DAILY    albuterol (PROVENTIL HFA, VENTOLIN HFA, PROAIR HFA) inhaler 2 Puff  2 Puff Inhalation Q4H RT    fluticasone propionate (FLONASE) 50 mcg/actuation nasal spray 2 Spray  2 Spray Both Nostrils ACB/HS    sodium chloride (OCEAN) 0.65 % nasal squeeze bottle 2 Spray  2 Spray Both Nostrils Q2H PRN    aspirin delayed-release tablet 81 mg  81 mg Oral DAILY    losartan (COZAAR) tablet 100 mg  100 mg Oral DAILY    insulin pump (PATIENT SUPPLIED)   SubCUTAneous PRN    sodium chloride (NS) flush 5-10 mL  5-10 mL IntraVENous PRN    sodium chloride (NS) flush 5-40 mL  5-40 mL IntraVENous Q8H    sodium chloride (NS) flush 5-40 mL  5-40 mL IntraVENous PRN    acetaminophen (TYLENOL) tablet 650 mg  650 mg Oral Q6H PRN    Or    acetaminophen (TYLENOL) suppository 650 mg  650 mg Rectal Q6H PRN    polyethylene glycol (MIRALAX) packet 17 g  17 g Oral DAILY PRN    promethazine (PHENERGAN) tablet 12.5 mg  12.5 mg Oral Q6H PRN    Or    ondansetron (ZOFRAN) injection 4 mg  4 mg IntraVENous Q6H PRN    cholecalciferol (VITAMIN D3) (1000 Units /25 mcg) tablet 2,000 Units  2,000 Units Oral DAILY    guaiFENesin-dextromethorphan (ROBITUSSIN DM) 100-10 mg/5 mL syrup 5 mL  5 mL Oral Q4H PRN    benzonatate (TESSALON) capsule 200 mg  200 mg Oral TID PRN    therapeutic multivitamin (THERAGRAN) tablet 1 Tab  1 Tab Oral DAILY    enoxaparin (LOVENOX) injection 40 mg  40 mg SubCUTAneous Q12H    glucose chewable tablet 16 g  4 Tab Oral PRN    dextrose (D50W) injection syrg 12.5-25 g  25-50 mL IntraVENous PRN    glucagon (GLUCAGEN) injection 1 mg  1 mg IntraMUSCular PRN       Care Plan discussed with: Patient/Family and Nurse    Total time spent with patient: 40 minutes.     Doron Rebolledo MD

## 2021-04-21 NOTE — PROGRESS NOTES
4584 - Glucose of 52 called from lab. I rechecked the glucose with a fingerstick blood sugar at 0530, and got 62. Patient asymptomatic. Juice and crackers given. Recheck at [facility] 5273, blood sugar was 112.

## 2021-04-22 LAB
BNP SERPL-MCNC: 19 PG/ML
COMMENT, HOLDF: NORMAL
CRP SERPL-MCNC: <0.29 MG/DL (ref 0–0.6)
D DIMER PPP FEU-MCNC: 0.96 MG/L FEU (ref 0–0.65)
ERYTHROCYTE [DISTWIDTH] IN BLOOD BY AUTOMATED COUNT: 12.2 % (ref 11.5–14.5)
GLUCOSE BLD STRIP.AUTO-MCNC: 165 MG/DL (ref 65–100)
GLUCOSE BLD STRIP.AUTO-MCNC: 196 MG/DL (ref 65–100)
GLUCOSE BLD STRIP.AUTO-MCNC: 212 MG/DL (ref 65–100)
GLUCOSE BLD STRIP.AUTO-MCNC: 222 MG/DL (ref 65–100)
HCT VFR BLD AUTO: 38.2 % (ref 36.6–50.3)
HGB BLD-MCNC: 12.6 G/DL (ref 12.1–17)
MCH RBC QN AUTO: 28 PG (ref 26–34)
MCHC RBC AUTO-ENTMCNC: 33 G/DL (ref 30–36.5)
MCV RBC AUTO: 84.9 FL (ref 80–99)
NRBC # BLD: 0.02 K/UL (ref 0–0.01)
NRBC BLD-RTO: 0.1 PER 100 WBC
PLATELET # BLD AUTO: 309 K/UL (ref 150–400)
PMV BLD AUTO: 9.4 FL (ref 8.9–12.9)
RBC # BLD AUTO: 4.5 M/UL (ref 4.1–5.7)
SAMPLES BEING HELD,HOLD: NORMAL
SERVICE CMNT-IMP: ABNORMAL
WBC # BLD AUTO: 14.1 K/UL (ref 4.1–11.1)

## 2021-04-22 PROCEDURE — 83880 ASSAY OF NATRIURETIC PEPTIDE: CPT

## 2021-04-22 PROCEDURE — 74011250637 HC RX REV CODE- 250/637: Performed by: INTERNAL MEDICINE

## 2021-04-22 PROCEDURE — 77010033678 HC OXYGEN DAILY

## 2021-04-22 PROCEDURE — 82962 GLUCOSE BLOOD TEST: CPT

## 2021-04-22 PROCEDURE — 94640 AIRWAY INHALATION TREATMENT: CPT

## 2021-04-22 PROCEDURE — 74011636637 HC RX REV CODE- 636/637: Performed by: PHYSICIAN ASSISTANT

## 2021-04-22 PROCEDURE — 74011250636 HC RX REV CODE- 250/636: Performed by: INTERNAL MEDICINE

## 2021-04-22 PROCEDURE — 85027 COMPLETE CBC AUTOMATED: CPT

## 2021-04-22 PROCEDURE — 74011000250 HC RX REV CODE- 250: Performed by: PHYSICIAN ASSISTANT

## 2021-04-22 PROCEDURE — 85379 FIBRIN DEGRADATION QUANT: CPT

## 2021-04-22 PROCEDURE — 86140 C-REACTIVE PROTEIN: CPT

## 2021-04-22 PROCEDURE — 65660000000 HC RM CCU STEPDOWN

## 2021-04-22 PROCEDURE — 94664 DEMO&/EVAL PT USE INHALER: CPT

## 2021-04-22 PROCEDURE — 99231 SBSQ HOSP IP/OBS SF/LOW 25: CPT | Performed by: CLINICAL NURSE SPECIALIST

## 2021-04-22 PROCEDURE — 36415 COLL VENOUS BLD VENIPUNCTURE: CPT

## 2021-04-22 RX ORDER — LOSARTAN POTASSIUM 100 MG/1
TABLET ORAL
Qty: 90 TAB | Refills: 3 | Status: SHIPPED | OUTPATIENT
Start: 2021-04-22 | End: 2022-03-28

## 2021-04-22 RX ORDER — CHLORTHALIDONE 25 MG/1
TABLET ORAL
Qty: 90 TAB | Refills: 3 | Status: SHIPPED | OUTPATIENT
Start: 2021-04-22 | End: 2022-03-28

## 2021-04-22 RX ADMIN — LOSARTAN POTASSIUM 100 MG: 50 TABLET, FILM COATED ORAL at 09:25

## 2021-04-22 RX ADMIN — THERA TABS 1 TABLET: TAB at 09:25

## 2021-04-22 RX ADMIN — ENOXAPARIN SODIUM 40 MG: 40 INJECTION SUBCUTANEOUS at 17:06

## 2021-04-22 RX ADMIN — Medication 10 ML: at 07:10

## 2021-04-22 RX ADMIN — ASPIRIN 81 MG: 81 TABLET, COATED ORAL at 09:25

## 2021-04-22 RX ADMIN — ALBUTEROL SULFATE 2 PUFF: 90 AEROSOL, METERED RESPIRATORY (INHALATION) at 00:38

## 2021-04-22 RX ADMIN — Medication 2000 UNITS: at 09:25

## 2021-04-22 RX ADMIN — ALBUTEROL SULFATE 2 PUFF: 90 AEROSOL, METERED RESPIRATORY (INHALATION) at 13:08

## 2021-04-22 RX ADMIN — ALBUTEROL SULFATE 2 PUFF: 90 AEROSOL, METERED RESPIRATORY (INHALATION) at 17:56

## 2021-04-22 RX ADMIN — PREDNISONE 40 MG: 20 TABLET ORAL at 09:25

## 2021-04-22 RX ADMIN — ALBUTEROL SULFATE 2 PUFF: 90 AEROSOL, METERED RESPIRATORY (INHALATION) at 08:06

## 2021-04-22 RX ADMIN — Medication 10 ML: at 14:28

## 2021-04-22 RX ADMIN — FLUTICASONE PROPIONATE 2 SPRAY: 50 SPRAY, METERED NASAL at 07:09

## 2021-04-22 RX ADMIN — Medication 10 ML: at 22:00

## 2021-04-22 RX ADMIN — ENOXAPARIN SODIUM 40 MG: 40 INJECTION SUBCUTANEOUS at 03:59

## 2021-04-22 RX ADMIN — BUMETANIDE 1 MG: 0.25 INJECTION INTRAMUSCULAR; INTRAVENOUS at 09:24

## 2021-04-22 NOTE — PROGRESS NOTES
Bedside and Verbal shift change report given to Angelica Hare (oncoming nurse) by Bridgette Moralez (offgoing nurse). Report included the following information SBAR, Kardex, ED Summary, Procedure Summary, MAR, Recent Results and Cardiac Rhythm Normal Sinus Rhythm.

## 2021-04-22 NOTE — PROGRESS NOTES
Problem: Falls - Risk of  Goal: *Absence of Falls  Description: Document Niru Osman Fall Risk and appropriate interventions in the flowsheet. Outcome: Progressing Towards Goal  Note: Fall Risk Interventions:  Pt remains free of falls during admission. Call bell and frequently used items within reach. Bedside table within reach. Pt provided nonskid socks and instructed to call out for nurse when in need of assistance. Problem: Breathing Pattern - Ineffective  Goal: *Absence of hypoxia  Outcome: Progressing Towards Goal  Goal: *Use of effective breathing techniques  Outcome: Progressing Towards Goal   Pt remains on 9L midflow and cpap at night. No s/s of respiratory distress. Will continue to monitor. Bedside shift change report given to Inna (oncoming nurse) by Shahram Amaya (offgoing nurse).  Report included the following information SBAR, Intake/Output, Recent Results, and Cardiac Rhythm Sr .

## 2021-04-22 NOTE — ADT AUTH CERT NOTES
Viral Illness, Acute - Care Day 11 (4/22/2021) by Bard Avinash RN       Review Status Review Entered   Completed 4/22/2021 12:26      Criteria Review      Care Day: 11 Care Date: 4/22/2021 Level of Care: Telemetry    Guideline Day 2    Level Of Care    (X) Floor    4/22/2021 12:26:25 EDT by Erika Lucio      4/22 tele    Clinical Status    (X) * Hypotension absent    4/22/2021 12:26:25 EDT by Erika Lucio      98.1 91 97/86 19 94% 9l high flow o2 nc    (X) * No requirement for mechanical ventilation    4/22/2021 12:26:25 EDT by Erika Lucio      na    ( ) * Oxygenation at baseline or improved    4/22/2021 12:26:25 EDT by Erika Lucio      9 l high flow nc    (X) * Mental status at baseline    4/22/2021 12:26:25 EDT by Erika Lucio      AAOX3    Activity    (X) Advance activity as tolerated    4/22/2021 12:26:25 EDT by Erika Lucio      activity as tolerated    Routes    (X) * Oral hydration    (X) Oral or IV medications    4/22/2021 12:26:25 EDT by Erika Lucio      iv bumex 1 mg daily po deltasone 40 mg daily    (X) Usual diet    4/22/2021 12:26:25 EDT by Erika Lucio      dm diet    Interventions    (X) Possible isolation    4/22/2021 12:26:25 EDT by Erika Lucio      droplet plus isol    (X) Pulse oximetry    4/22/2021 12:26:25 EDT by Erika Lucio      cont    (X) Possible oxygen    4/22/2021 12:26:25 EDT by Erika Lucio      9 liter nc high flow    4/22/2021 12:26:25 EDT by Erika Lucio    Subject: Additional Clinical Information    lovenox sc 40 mg q12h       * Milestone   Additional Notes   4/22/21 LOC IP TELE       Per attending    Acute hypoxic respiratory failure - slightly improving. On oxygen via [address] 9 l/min   COVID-19 virus   Diarrhea, improved   Bilateral pneumonia    S/p IV Actemra x 1 on 4/14. On prednisone   He was evaluated for remdesivir but he was not a candidate due to elevated LFTs, borderline time duration of symptom onset, almost close to 7-day on 4/14 when started getting hypoxic    Previous hospitalists discussed with Pulm about Plasma - Does not seem as a potential option due to timing in the course of illness   appreciate pulmonology input   On Bumex 1 mg daily since April 20.    continue ICMU care, albuterol MDI, Flonase   Continue monitoring inflammatory markers including D-dimer, CRP. High risk for deterioration, continue close monitoring   DM2 with hyperglycemia and hypoglycemia   Patient is on insulin pump he is managing by himself. Earnest Oven was recommended to adjust his pump according to ongoing hypoglycemia.  Diabetic teams are on board. General: Alert and oriented, no in distress. Sitting in chair,wearing oxygen via [address] Lungs:   Clear to auscultation bilaterally. Heart: Regular rate and rhythm, S1, S2 normal, no murmur.     Abdomen:   Obese, soft, non-tender. Bowel sounds normal.    Extremities: Extremities normal, atraumatic, no cyanosis or edema.    Neurologic: Patient has clear voice and he talks appropriately. Earnest Oven can move his extremities equally against gravity.            Viral Illness, Acute - Care Day 10 (4/21/2021) by Everardo Peacock       Review Status Review Entered   Completed 4/21/2021 16:15      Criteria Review      Care Day: 10 Care Date: 4/21/2021 Level of Care: Telemetry    Guideline Day 2    Level Of Care    (X) Floor    Clinical Status    (X) * Hypotension absent    4/21/2021 16:15:40 EDT by Tara Chand    ( ) * No requirement for mechanical ventilation    4/21/2021 16:15:40 EDT by Ray Kelly      12 L HFNC    ( ) * Oxygenation at baseline or improved    (X) * Mental status at baseline    Activity    ( ) Advance activity as tolerated    4/21/2021 16:15:40 EDT by Ray Kelly      AMBULATED to restroom yesterday    Routes    (X) * Oral hydration    (X) Oral or IV medications    4/21/2021 16:15:40 EDT by Ray Kelly      meds below    (X) Usual diet    4/21/2021 16:15:40 EDT by Pancho Bunch      diab diet    Interventions    (X) Possible isolation    4/21/2021 16:15:40 EDT by Pancho Bunch      droplet plus    (X) Pulse oximetry    (X) Possible oxygen    4/21/2021 16:15:40 EDT by Pancho Bunch    Subject: Additional Clinical Information    meds;      albuterol 2 puffs q 4h,  asa 81 mg po q d, bumex 1 mg iv q d, Lovenox 40 mg sc q 12h, flonase 2 spray bid both nostrils, insulin pump patient's own , cozaar 100 mg po q d, SoluMedrol 40 mg iv q 12h,       * Milestone   Additional Notes   4/21    97.9 °F (36.6 °C) 102 125/65 - At rest;Sitting;Reclining RR 20 , 22, 24, 23    Sat 95 % - 12 liters HFNC   348 #. CXR *   IMPRESSION   Worsening bilateral infiltrates at the lung bases consistent with worsening   pneumonia. Lab   wbc 20.9, plt 439, gluc 52, bun 28.       ------PULMONARY TEAM    Impression Plan   1. COVID 19 + PNA, B + blood type.  Vit d deficient    2. Diaerrha, suspect due to above, improving     3. Elevated liver enzymes, improving. Suspected to be induced by COVID19   4. SHALOM, on home CPAP   5. DM II   6. HLD    PLAN    1. Monitoring inflammatory markers    2. D dimer, probnp to be monitored     3. Cpap at night   4. Chest film     5. Solu medrol reduce to PO   6. O2 titration above 90% ; on midflow    7. S/p actemra on 4/14   8.  Diuresis daily   9. lovenox bid dosing        Daily Progression:       4/21   Less congested    Feeling better   O2 requirements are down       Physical Exam:                                                                                      Exam Findings    General: No resp distress noted, appears stated age     HEENT: No ulcers, JVD not elevated, no cervical LAD     Chest: No pectus deformity, normal chest rise b/l     HEART: No visible thrills     Lungs: Normal expansion     ABD: Soft/NT, non rigid mildly distended     EXT: No cyanosis/clubbing/edema, normal peripheral pulses     Skin: No rashes or ulcers, no mottling     Neuro: A/O x 3

## 2021-04-22 NOTE — PROGRESS NOTES
Pulmonary, Critical Care, and Sleep Medicine~Progress Note    Name: Andrzej Serrano MRN: [id number]   : 1975 Hospital: [facility]    Date: 2021 11:33 AM Admission: 2021     Impression Plan   1. COVID 19 + PNA, B + blood type. Vit d deficient   2. Diaerrha, suspect due to above, improving    3. Elevated liver enzymes, improving. Suspected to be induced by COVID19  4. SHALOM, on home CPAP  5. DM II  6. HLD 1. Monitoring inflammatory markers   2. D dimer, probnp to be monitored    3. Cpap at night  4. Po steroids taper over the next 7 days   5. O2 titration above 90% ; on midflow. Continue to reduce   6. S/p actemra on   7. Diuresis daily until discharge or off O2  8. lovenox bid dosing   9. Follow up in 7 days as telemedicine visit  10. We will be available again to see if needed      Daily Progression:      Looking and feeling better  O2 requirements trending down  CRP ok  probnp ok  Chest x-ray yesterday noted, suspect radiographic lag. Clinically he is improving   D dimer down       Less congested   Feeling better  O2 requirements are down       On midflow still  Nasal congestion a little better today       On midflow  D dimer 1.57  CRP 0.66  probnp 31  LFTs up a little       Chest film looks a little better  O2 saturations are low  D dimer 0.57  CRP 3.51  probnp 49    4/15  0.51 d dimer   CRP 6.8  probnp 64  Feeling better following actemra    Nasal congestion noted today       Now requiring O2  Feels ok; wore NIV overnight  D dimer 0.42  CRP 9.93       Consult Note requested by hospitalist     Patient presented with increased abdominal pains and diarrhea for the past 5 days. No overt pulmonary complaints were noted. Currently on room air. No pervious pulmonary complaints noted. Still spiking fevers, but feeling better. I have reviewed the labs and previous days notes. Pertinent items are noted in HPI.   Past Medical History:   Diagnosis Date    DM (diabetes mellitus) ([facility] [address] Age 29    HLD (hyperlipidaemia)     HTN       Past Surgical History:   Procedure Laterality Date    HX ORTHOPAEDIC Left 1998    pins in toes of foot    HX REFRACTIVE SURGERY        Prior to Admission medications    Medication Sig Start Date End Date Taking? Authorizing Provider   [facility] continuous. Yes Provider, Historical   metFORMIN ER (GLUCOPHAGE XR) 500 mg tablet TAKE 2 TABLETS WITH BREAKFAST AND DINNER 3/31/21  Yes Leora Longoria MD   atorvastatin (LIPITOR) 10 mg tablet TAKE 1 TABLET DAILY 3/3/21  Yes Milton Pearson MD   losartan (COZAAR) 100 mg tablet TAKE 1 TABLET DAILY 20  Yes Milton Pearson MD   chlorthalidone (HYGROTEN) 25 mg tablet TAKE 1 TABLET DAILY 20  Yes Milton Pearson MD   multivitamin (DAILY MULTI-VITAMIN) tablet Take 1 Tab by mouth daily. Yes Provider, Historical   aspirin 81 mg tablet Take 81 mg by mouth daily. Yes Provider, Historical     Allergies   Allergen Reactions    Lisinopril Cough      Social History     Tobacco Use    Smoking status: Never Smoker    Smokeless tobacco: Never Used   Substance Use Topics    Alcohol use: Yes     Alcohol/week: 0.0 standard drinks     Comment: glass of wine 1-2 times a month      Family History   Problem Relation Age of Onset    Diabetes Mother     Heart Attack Father [address] [facility]    Diabetes Maternal Grandmother     Diabetes Maternal Grandfather     Diabetes Paternal Grandmother     Diabetes Paternal Grandfather      OBJECTIVE:     Vital Signs:       Visit Vitals  /65   Pulse 95   Temp 98.1 °F (36.7 °C)   Resp 19   Ht 5' 10\" (1.778 m)   Wt 158 kg (348 lb 5.2 oz)   SpO2 94%   BMI 49.98 kg/m²      Temp (24hrs), Av.1 °F (36.7 °C), Min:97.8 °F (36.6 °C), Max:98.4 °F (36.9 °C)     Intake/Output:     Last shift: No intake/output data recorded.     Last 3 shifts:  1901 -  0700  In: 4420 [P.O.:2240]  Out: -           Intake/Output Summary (Last 24 hours) at 4/22/2021 1133  Last data filed at 4/21/2021 1900  Gross per 24 hour   Intake 1440 ml   Output --   Net 1440 ml       Physical Exam:                                        Exam Findings Other   General: No resp distress noted, appears stated age    [de-identified]:  No ulcers, JVD not elevated, no cervical LAD    Chest: No pectus deformity, normal chest rise b/l    HEART:  No visible thrills    Lungs:  Normal expansion     ABD: Soft/NT, non rigid mildly distended    EXT: No cyanosis/clubbing/edema, normal peripheral pulses    Skin: No rashes or ulcers, no mottling    Neuro: A/O x 3        Medications:  Current Facility-Administered Medications   Medication Dose Route Frequency    predniSONE (DELTASONE) tablet 40 mg  40 mg Oral DAILY WITH BREAKFAST    bumetanide (BUMEX) injection 1 mg  1 mg IntraVENous DAILY    albuterol (PROVENTIL HFA, VENTOLIN HFA, PROAIR HFA) inhaler 2 Puff  2 Puff Inhalation Q4H RT    fluticasone propionate (FLONASE) 50 mcg/actuation nasal spray 2 Spray  2 Spray Both Nostrils ACB/HS    sodium chloride (OCEAN) 0.65 % nasal squeeze bottle 2 Spray  2 Spray Both Nostrils Q2H PRN    aspirin delayed-release tablet 81 mg  81 mg Oral DAILY    losartan (COZAAR) tablet 100 mg  100 mg Oral DAILY    insulin pump (PATIENT SUPPLIED)   SubCUTAneous PRN    sodium chloride (NS) flush 5-10 mL  5-10 mL IntraVENous PRN    sodium chloride (NS) flush 5-40 mL  5-40 mL IntraVENous Q8H    sodium chloride (NS) flush 5-40 mL  5-40 mL IntraVENous PRN    acetaminophen (TYLENOL) tablet 650 mg  650 mg Oral Q6H PRN    Or    acetaminophen (TYLENOL) suppository 650 mg  650 mg Rectal Q6H PRN    polyethylene glycol (MIRALAX) packet 17 g  17 g Oral DAILY PRN    promethazine (PHENERGAN) tablet 12.5 mg  12.5 mg Oral Q6H PRN    Or    ondansetron (ZOFRAN) injection 4 mg  4 mg IntraVENous Q6H PRN    cholecalciferol (VITAMIN D3) (1000 Units /25 mcg) tablet 2,000 Units  2,000 Units Oral DAILY    guaiFENesin-dextromethorphan (ROBITUSSIN DM) 100-10 mg/5 mL syrup 5 mL  5 mL Oral Q4H PRN    benzonatate (TESSALON) capsule 200 mg  200 mg Oral TID PRN    therapeutic multivitamin (THERAGRAN) tablet 1 Tab  1 Tab Oral DAILY    enoxaparin (LOVENOX) injection 40 mg  40 mg SubCUTAneous Q12H    glucose chewable tablet 16 g  4 Tab Oral PRN    dextrose (D50W) injection syrg 12.5-25 g  25-50 mL IntraVENous PRN    glucagon (GLUCAGEN) injection 1 mg  1 mg IntraMUSCular PRN       Labs:  ABG No results for input(s): PHI, PCO2I, PO2I, HCO3I, SO2I, FIO2I in the last 72 hours.      CBC Recent Labs     04/22/21  0358 04/21/21  0418   WBC 14.1* 20.9*   HGB 12.6 14.3   HCT 38.2 43.2    439*   MCV 84.9 84.2   MCH 28.0 63.2        Metabolic  Panel Recent Labs     04/21/21  0418      K 4.3      CO2 27   GLU 52*   BUN 28*   CREA 0.92   CA 8.5        Pertinent Labs                Jordan Neil PA-C  4/22/2021

## 2021-04-22 NOTE — DIABETES MGMT
[facility]    CLINICAL NURSE SPECIALIST CONSULT   FOLLOW UP NOTE    Initial Presentation   Rocael Pavon is a 39 y.o. male who presents to the ED 4/12/21 with a 4 day complaint of diarrhea with abdominal pain. He had a rapid COVID-19 test which was positive    HX:   Past Medical History:   Diagnosis Date    DM (diabetes mellitus) ([facility] [address] Age 32    HLD (hyperlipidaemia)     HTN         DX: Gastritis, COVID-19 pneumonia    TX: IV steroids/IV antibiotics     Hospital course   Clinical progress has been uncomplicated. Diabetes    Patient has known Type 2 diabetes, treated with Metformin and Humalog PTA. Family history positive for diabetes: Mother with Type 2 Diabetes     Admission  and A1c 9.3% indicate poor diabetes control. Ambulatory blood glucose management provided by endocrinologist: Nalini Ventura MD with [facility].     Consulted by Carrie Yancey MD for advanced diabetes nursing assessment and care, specifically related to    [x] Inpatient management strategy      Diabetes-related medical history  Acute complications: Hyperglycemia  Neurological complications  Peripheral neuropathy  Microvascular disease: None  Macrovascular disease: None      Diabetes medication history  Drug class Currently in use Discontinued Never used   Biguanide Metformin 500mg at breakfast and dinner     DDP-4 inhibitor       Sulfonylurea      Thiazolidinedione      GLP-1 RA      SGLT-2 inhibitors      Basal insulin      Bolus insulin      Fixed Dose  Combinations Insulin pump via settings below       Pump settings:  - basal: 12a: 6.35 units/hr   - Carb ratio: 1:3  - sensitivity: 10  - target: 100-120  - active insulin time: 3 hr  Subjective   On 9L O2 with humidification  A1C 9.4%  Blood cultures with NGTD  Steroids weaned to prednisone 40mg daily  CRP very low- less than 0.29  S/p actremra 4/14    Continues on insulin pump  Fasting glucose 165  24 hr glucose pattern: 142-172  Basal rate: 6.35 units/hr from 9p-9a and increased to 8.35 from 9a-9p      4/20/21:  652a: 24.6 units with Solu-Medrol  9a: 20 units for breakfast  1246:  23.05 units for lunch  133 11 units for correctional  336 9 units for snack  504 , 10.25 units for correction  534 25 units for dinner  928 25 units with Solu-Medrol   1054 75 cho      4/21/21:  855 16.65 units for breakfast  142p 16.65 units for lunch  602p 19 units for dinner  951p 11.6 units for bedtime snack    4/22/21  10a 21 units for breakfast  Objective   Physical exam  General Alert, oriented and in no acute distress/ill-appearing. Conversant and cooperative. Vital Signs   Visit Vitals  /65   Pulse 95   Temp 98.1 °F (36.7 °C)   Resp 19   Ht 5' 10\" (1.778 m)   Wt 158 kg (348 lb 5.2 oz)   SpO2 94%   BMI 49.98 kg/m²     Skin  Warm and dry. Acanthosis noted along neckline. No lipohypertrophy or lipoatrophy noted at injection sites   Heart   Regular rate and rhythm. No murmurs, rubs or gallops  Lungs  Clear to auscultation without rales or rhonchi  Extremities No foot wounds      Laboratory      CBC W/O DIFF    Collection Time: 04/22/21  3:58 AM   Result Value Ref Range    WBC 14.1 (H) 4.1 - 11.1 K/uL    RBC 4.50 4. 10 - 5.70 M/uL    HGB 12.6 12.1 - 17.0 g/dL    HCT 38.2 36.6 - 50.3 %    MCV 84.9 80.0 - 99.0 FL    MCH 28.0 26.0 - 34.0 PG    MCHC 33.0 30.0 - 36.5 g/dL    RDW 12.2 11.5 - 14.5 %    PLATELET 244 012 - 708 K/uL    MPV 9.4 8.9 - 12.9 FL    NRBC 0.1 (H) 0  WBC    ABSOLUTE NRBC 0.02 (H) 0.00 - 0.01 K/uL     No results found for this visit on 04/12/21 (from the past 12 hour(s)). All inpatient labs reviewed in full    Factors impacting BG management  Factor Dose Comments   Nutrition:  Carb-controlled meals     60 grams/meal      Drugs:  Steroids   Prednisone 40mg daily Insulin coverage with prednisone (in addition to basal insulin).   40 mg prednisone = 0.4 units/kg NPH to be given with prednisone 30 mg prednisone= 0.3 units/kg NPH to be given with prednisone     20 mg prednisone= 0.2 units/kg NPH to be given with prednisone     10 mg prednisone= 0.1 units/kg NPH to be given with prednisone        Infection/COVID-19  Hyperglycemia in the setting of COVID-19 infection is associated with a blunted immune response and delayed recovery. The subcutaneous insulin order set can be initiated to optimize his blood glucose control in the impatient setting. Blood glucose pattern        Assessment and Plan   Nursing Diagnosis Risk for unstable blood glucose pattern   Nursing Intervention Domain 5250 Decision-making Support   Nursing Interventions Examined current inpatient diabetes control   Explored factors facilitating and impeding inpatient management  Identified self-management practices impeding diabetes control  Explored corrective strategies with patient and responsible inpatient provider   Informed patient of rational for insulin strategy while hospitalized     Evaluation   This  gentleman, with Type 2 diabetes, on very high dose insulin via [employer] MiniMed insulin pump did not achieve diabetes control prior to admission, as evidenced by admission BG of 397 and A1c of 9.3%. During this hospitalization, his insulin pump has been resumed and the patient has nearly achieved inpatient blood glucose target of 100-180mg/dl requiring up to 350 units of insulin/day. Several factors have played a role in blood glucose management including:  [x] Critical nature of illness state  [x] Glucocorticoid use    Insulin pump therapy has been in use for duration of hospital stay. The PTA pump settings and additional 25 unit bolus per 40 mg Solumedrol was largely controlling blood glucose. Now that solu-medrol was weaned to prednisone- glucose will rise for 12 hrs-15 hrs with each dose. Basal insulin was increased for 12hr following each prednisone dose instead of 25 unit bolus.      As steroids are weaned, patient will need to be notified of dose and time changes for insulin pump adjustments. Recommendations   1. Patient awake and able to independently utilize insulin pump at PTA insulin settings at this time. Patient told to notify RN of ALL insulin boluses. 2. Agree for patient to get additional 25 units with each dose of 40 mg Solumedrol. Increased basal rate by 2 units for 12 hrs with each Solu-Medrol dose. Please notify patient of steroid dose changes or dose being held    Increased basal rate from 9a to 9p by 2 units/hr    3. POC glucose ACHS. 4. Patient to bolus off the pump for all carbohydrate intake    5. Patient to enter blood glucose into pump to deliver additional correctional insulin ACHS. 6. RN to document all insulin doses given to patient off insulin pump in EMR (under LDA flow sheet)        If insulin pump needs to be removed (with current steroid order- solumedrol 40mg Q6) back-up settings are:  Basal: 75 units NPH Q12 (PLUS additional 25 units for each 40 mg Solu-medrol dose)  Bolus: 1 unit Humalog for every 3 grams Carbs  Correctional: 1 unit Humalog for every 10 points over 200  Billing Code(s)   [x] 14624      Before making these care recommendations, I personally reviewed the hosptialization record, including laboratory and diagnostic data, medications and examined the patient at bedside (circumstances permitting).   Total minutes: 21    JOSÉ MIGUEL Andersen  Diabetes Clinical Nurse Specialist  [facility]  Access via [employer]

## 2021-04-22 NOTE — PROGRESS NOTES
Hospitalist Progress Note         Shan Decker MD    Please call  and page for questions. Call physician on-call through the  7pm-7am    Daily Progress Note: 4/22/2021    Primary care provider:Tani Ashford MD    Date of admission: 4/12/2021 11:55 AM    Admission summery and hospital course:  39 y. o. male with PMH of DM 2 on insulin pump, HTN, HLD, SHALOM on CPAP at home nightly, came to ED on April 12 for evaluation of diarrhea going on for 5 days, associated nausea/ vomiting/ fever/ chills/ abdominal pain.  In the ED, patient was evaluated with COVID-19 rapid testing which turned positive. Subjective:   Mr Joss Owusu is sitting in chair, on oxygen at 9l/min via [address] He denies sob or respiratory difficulty. He notes drop in oxygen with walking in the room. He says he is comfortable managing his insulin pump    Assessment/Plan:   Acute hypoxic respiratory failure - slightly improving. On oxygen via [address] 9 l/min  COVID-19 virus  Diarrhea, improved  Bilateral pneumonia   -S/p IV Actemra x 1 on 4/14. On prednisone  -He was evaluated for remdesivir but he was not a candidate due to elevated LFTs, borderline time duration of symptom onset, almost close to 7-day on 4/14 when started getting hypoxic   -Previous hospitalists discussed with Pulm about Plasma - Does not seem as a potential option due to timing in the course of illness  -appreciate pulmonology input  - On Bumex 1 mg daily since April 20.   - continue ICMU care, albuterol MDI, Flonase  -Continue monitoring inflammatory markers including D-dimer, CRP. -High risk for deterioration, continue close monitoring     DM2 with hyperglycemia and hypoglycemia  Patient is on insulin pump he is managing by himself. Patient was recommended to adjust his pump according to ongoing hypoglycemia.  Diabetic teams are on board.     Hypertension, controlled  Continue losartan and follow.     Hyponatremia, related to dehydration - resolved   Mild MARYCARMEN - Resolved   Transaminases, likely viral related, improving,  continue to monitor. HLD-statins are on hold due to transaminitis  SHALOM on CPAP at night. Tolerating well.     Morbid obesity: Counseling was provided about weight loss       VTE prophylaxis: Lovenox twice daily  Code status: Full code  Discussed plan of care with Patient/Family and Nurse. Pre-admission lived at home. Discharge planning: pending. High risk of deterioration       Review of Systems:     Review of Systems:  Symptom  Y/N  Comments   Symptom  Y/N  Comments    Fever/Chills  n    Chest Pain  n    Poor Appetite   n   Edema  n     Cough  y   Abdominal Pain   n    Sputum  n   Joint Pain      SOB/NORTH  n   Pruritis/Rash      Nausea/vomit  n   Tolerating PT/OT      Diarrhea     Tolerating Diet      Constipation     Other      Could not obtain due to:         Objective:   Physical Exam:     Visit Vitals  BP 97/86   Pulse 91   Temp 98.1 °F (36.7 °C)   Resp 19   Ht 5' 10\" (1.778 m)   Wt 158 kg (348 lb 5.2 oz)   SpO2 96%   BMI 49.98 kg/m²    O2 Flow Rate (L/min): 9 l/min O2 Device: Hi flow nasal cannula    Temp (24hrs), Av.1 °F (36.7 °C), Min:97.8 °F (36.6 °C), Max:98.4 °F (36.9 °C)    No intake/output data recorded.  1901 -  0700  In: 2240 [P.O.:2240]  Out: -       General:  Alert and oriented, no in distress. Sitting in chair,wearing oxygen via [address] Lungs:   Clear to auscultation bilaterally. Heart:  Regular rate and rhythm, S1, S2 normal, no murmur. Abdomen:   Obese, soft, non-tender. Bowel sounds normal.    Extremities: Extremities normal, atraumatic, no cyanosis or edema. Neurologic:  Patient has clear voice and he talks appropriately. Patient can move his extremities equally against gravity.      Data Review:       Recent Days:  Recent Labs     21  0358 21  0418   WBC 14.1* 20.9*   HGB 12.6 14.3   HCT 38.2 43.2    439*     Recent Labs     21  0418      K 4.3   CL 104   CO2 27   GLU 52*   BUN 28*   CREA 0.92   CA 8.5     No results for input(s): PH, PCO2, PO2, HCO3, FIO2 in the last 72 hours. 24 Hour Results:  Recent Results (from the past 24 hour(s))   GLUCOSE, POC    Collection Time: 04/21/21 11:31 AM   Result Value Ref Range    Glucose (POC) 142 (H) 65 - 100 mg/dL    Performed by Kari GARCIA    GLUCOSE, POC    Collection Time: 04/21/21  5:25 PM   Result Value Ref Range    Glucose (POC) 179 (H) 65 - 100 mg/dL    Performed by Jonny Lantigua 85, POC    Collection Time: 04/21/21  9:19 PM   Result Value Ref Range    Glucose (POC) 172 (H) 65 - 100 mg/dL    Performed by Pamla Crigler    CBC W/O DIFF    Collection Time: 04/22/21  3:58 AM   Result Value Ref Range    WBC 14.1 (H) 4.1 - 11.1 K/uL    RBC 4.50 4. 10 - 5.70 M/uL    HGB 12.6 12.1 - 17.0 g/dL    HCT 38.2 36.6 - 50.3 %    MCV 84.9 80.0 - 99.0 FL    MCH 28.0 26.0 - 34.0 PG    MCHC 33.0 30.0 - 36.5 g/dL    RDW 12.2 11.5 - 14.5 %    PLATELET 373 752 - 888 K/uL    MPV 9.4 8.9 - 12.9 FL    NRBC 0.1 (H) 0  WBC    ABSOLUTE NRBC 0.02 (H) 0.00 - 0.01 K/uL   C REACTIVE PROTEIN, QT    Collection Time: 04/22/21  4:07 AM   Result Value Ref Range    C-Reactive protein <0.29 0.00 - 0.60 mg/dL   NT-PRO BNP    Collection Time: 04/22/21  4:07 AM   Result Value Ref Range    NT pro-BNP 19 <125 PG/ML   D DIMER    Collection Time: 04/22/21  5:24 AM   Result Value Ref Range    D-dimer 0.96 (H) 0.00 - 0.65 mg/L FEU   SAMPLES BEING HELD    Collection Time: 04/22/21  5:24 AM   Result Value Ref Range    SAMPLES BEING HELD  1 LAV, 1 PST     COMMENT        Add-on orders for these samples will be processed based on acceptable specimen integrity and analyte stability, which may vary by analyte.    GLUCOSE, POC    Collection Time: 04/22/21  8:44 AM   Result Value Ref Range    Glucose (POC) 165 (H) 65 - 100 mg/dL    Performed by Marylee Lauber        Problem List:  Problem List as of 4/22/2021 Date Reviewed: 11/19/2020 Codes Class Noted - Resolved    Hyponatremia ICD-10-CM: E87.1  ICD-9-CM: 276.1  4/12/2021 - Present        Acute respiratory failure with hypoxemia [facility]) ICD-10-CM: J96.01  ICD-9-CM: 518.81  4/12/2021 - Present        Pneumonia due to COVID-19 virus ICD-10-CM: U07.1, J12.82  ICD-9-CM: 480.8, 079.89  4/12/2021 - Present        SHALOM on CPAP ICD-10-CM: G47.33, Z99.89  ICD-9-CM: 327.23, V46.8  11/25/2019 - Present        Obesity, morbid ([facility] ICD-10-CM: E66.01  ICD-9-CM: 278.01  1/18/2018 - Present        Retinopathy due to secondary diabetes [facility]) ICD-10-CM: E13.319  ICD-9-CM: 249.50, 362.01  11/8/2016 - Present        Diabetes mellitus due to underlying condition with mild nonproliferative diabetic retinopathy without macular edema ([facility] ICD-10-CM: Q18.6079  ICD-9-CM: 249.50, 362.04  5/29/2015 - Present        Wound of right leg ICD-10-CM: S81.801A  ICD-9-CM: 891.0  5/7/2013 - Present        Cellulitis and abscess of leg ICD-10-CM: L03.119, L02.419  ICD-9-CM: 682.6  5/7/2013 - Present        DM (diabetes mellitus) ([facility] ICD-10-CM: E11.9  ICD-9-CM: 250.00  6/1/2010 - Present    Overview Addendum 11/15/2018  8:54 AM by Juan Swift MD     Pump basal  6.2  Up basal to 4 units at  Night, up to 5.2 (1/2015)    Bolus  20 to 25   Effective 5/15   15 to 20             HLD (hyperlipidemia) ICD-10-CM: E78.5  ICD-9-CM: 272.4  6/1/2010 - Present        HTN (hypertension) ICD-10-CM: I10  ICD-9-CM: 401.9  6/1/2010 - Present              Medications reviewed  Current Facility-Administered Medications   Medication Dose Route Frequency    predniSONE (DELTASONE) tablet 40 mg  40 mg Oral DAILY WITH BREAKFAST    bumetanide (BUMEX) injection 1 mg  1 mg IntraVENous DAILY    albuterol (PROVENTIL HFA, VENTOLIN HFA, PROAIR HFA) inhaler 2 Puff  2 Puff Inhalation Q4H RT    fluticasone propionate (FLONASE) 50 mcg/actuation nasal spray 2 Spray  2 Spray Both Nostrils ACB/HS    sodium chloride (OCEAN) 0.65 % nasal squeeze bottle 2 Spray 2 [place] Both Nostrils Q2H PRN    aspirin delayed-release tablet 81 mg  81 mg Oral DAILY    losartan (COZAAR) tablet 100 mg  100 mg Oral DAILY    insulin pump (PATIENT SUPPLIED)   SubCUTAneous PRN    sodium chloride (NS) flush 5-10 mL  5-10 mL IntraVENous PRN    sodium chloride (NS) flush 5-40 mL  5-40 mL IntraVENous Q8H    sodium chloride (NS) flush 5-40 mL  5-40 mL IntraVENous PRN    acetaminophen (TYLENOL) tablet 650 mg  650 mg Oral Q6H PRN    Or    acetaminophen (TYLENOL) suppository 650 mg  650 mg Rectal Q6H PRN    polyethylene glycol (MIRALAX) packet 17 g  17 g Oral DAILY PRN    promethazine (PHENERGAN) tablet 12.5 mg  12.5 mg Oral Q6H PRN    Or    ondansetron (ZOFRAN) injection 4 mg  4 mg IntraVENous Q6H PRN    cholecalciferol (VITAMIN D3) (1000 Units /25 mcg) tablet 2,000 Units  2,000 Units Oral DAILY    guaiFENesin-dextromethorphan (ROBITUSSIN DM) 100-10 mg/5 mL syrup 5 mL  5 mL Oral Q4H PRN    benzonatate (TESSALON) capsule 200 mg  200 mg Oral TID PRN    therapeutic multivitamin (THERAGRAN) tablet 1 Tab  1 Tab Oral DAILY    enoxaparin (LOVENOX) injection 40 mg  40 mg SubCUTAneous Q12H    glucose chewable tablet 16 g  4 Tab Oral PRN    dextrose (D50W) injection syrg 12.5-25 g  25-50 mL IntraVENous PRN    glucagon (GLUCAGEN) injection 1 mg  1 mg IntraMUSCular PRN       Care Plan discussed with: Patient/Family and Nurse      Carlos Angel MD

## 2021-04-23 VITALS
HEIGHT: 70 IN | RESPIRATION RATE: 24 BRPM | SYSTOLIC BLOOD PRESSURE: 120 MMHG | HEART RATE: 106 BPM | TEMPERATURE: 98 F | DIASTOLIC BLOOD PRESSURE: 71 MMHG | OXYGEN SATURATION: 91 % | BODY MASS INDEX: 45.1 KG/M2 | WEIGHT: 315 LBS

## 2021-04-23 LAB
BNP SERPL-MCNC: 21 PG/ML
CRP SERPL-MCNC: <0.29 MG/DL (ref 0–0.6)
D DIMER PPP FEU-MCNC: 0.96 MG/L FEU (ref 0–0.65)
ERYTHROCYTE [DISTWIDTH] IN BLOOD BY AUTOMATED COUNT: 12.2 % (ref 11.5–14.5)
GLUCOSE BLD STRIP.AUTO-MCNC: 111 MG/DL (ref 65–100)
GLUCOSE BLD STRIP.AUTO-MCNC: 112 MG/DL (ref 65–100)
GLUCOSE BLD STRIP.AUTO-MCNC: 66 MG/DL (ref 65–100)
HCT VFR BLD AUTO: 38.5 % (ref 36.6–50.3)
HGB BLD-MCNC: 12.8 G/DL (ref 12.1–17)
MCH RBC QN AUTO: 28.1 PG (ref 26–34)
MCHC RBC AUTO-ENTMCNC: 33.2 G/DL (ref 30–36.5)
MCV RBC AUTO: 84.6 FL (ref 80–99)
NRBC # BLD: 0 K/UL (ref 0–0.01)
NRBC BLD-RTO: 0 PER 100 WBC
PLATELET # BLD AUTO: 298 K/UL (ref 150–400)
PMV BLD AUTO: 8.8 FL (ref 8.9–12.9)
RBC # BLD AUTO: 4.55 M/UL (ref 4.1–5.7)
SERVICE CMNT-IMP: ABNORMAL
SERVICE CMNT-IMP: ABNORMAL
SERVICE CMNT-IMP: NORMAL
WBC # BLD AUTO: 14.4 K/UL (ref 4.1–11.1)

## 2021-04-23 PROCEDURE — 82962 GLUCOSE BLOOD TEST: CPT

## 2021-04-23 PROCEDURE — 85027 COMPLETE CBC AUTOMATED: CPT

## 2021-04-23 PROCEDURE — 74011250636 HC RX REV CODE- 250/636: Performed by: INTERNAL MEDICINE

## 2021-04-23 PROCEDURE — 94640 AIRWAY INHALATION TREATMENT: CPT

## 2021-04-23 PROCEDURE — 74011636637 HC RX REV CODE- 636/637: Performed by: PHYSICIAN ASSISTANT

## 2021-04-23 PROCEDURE — 74011000250 HC RX REV CODE- 250: Performed by: PHYSICIAN ASSISTANT

## 2021-04-23 PROCEDURE — 83880 ASSAY OF NATRIURETIC PEPTIDE: CPT

## 2021-04-23 PROCEDURE — 74011250637 HC RX REV CODE- 250/637: Performed by: INTERNAL MEDICINE

## 2021-04-23 PROCEDURE — 85379 FIBRIN DEGRADATION QUANT: CPT

## 2021-04-23 PROCEDURE — 99231 SBSQ HOSP IP/OBS SF/LOW 25: CPT | Performed by: CLINICAL NURSE SPECIALIST

## 2021-04-23 PROCEDURE — 77010033678 HC OXYGEN DAILY

## 2021-04-23 PROCEDURE — 86140 C-REACTIVE PROTEIN: CPT

## 2021-04-23 PROCEDURE — 94762 N-INVAS EAR/PLS OXIMTRY CONT: CPT

## 2021-04-23 PROCEDURE — 36415 COLL VENOUS BLD VENIPUNCTURE: CPT

## 2021-04-23 PROCEDURE — 94660 CPAP INITIATION&MGMT: CPT

## 2021-04-23 RX ORDER — PREDNISONE 20 MG/1
40 TABLET ORAL
Qty: 10 TAB | Refills: 0 | Status: SHIPPED | OUTPATIENT
Start: 2021-04-24 | End: 2021-05-21 | Stop reason: ALTCHOICE

## 2021-04-23 RX ADMIN — THERA TABS 1 TABLET: TAB at 08:41

## 2021-04-23 RX ADMIN — FLUTICASONE PROPIONATE 2 SPRAY: 50 SPRAY, METERED NASAL at 06:54

## 2021-04-23 RX ADMIN — PREDNISONE 40 MG: 20 TABLET ORAL at 08:41

## 2021-04-23 RX ADMIN — BUMETANIDE 1 MG: 0.25 INJECTION INTRAMUSCULAR; INTRAVENOUS at 08:41

## 2021-04-23 RX ADMIN — Medication 10 ML: at 06:53

## 2021-04-23 RX ADMIN — Medication 2000 UNITS: at 08:41

## 2021-04-23 RX ADMIN — LOSARTAN POTASSIUM 100 MG: 50 TABLET, FILM COATED ORAL at 08:41

## 2021-04-23 RX ADMIN — ALBUTEROL SULFATE 2 PUFF: 90 AEROSOL, METERED RESPIRATORY (INHALATION) at 15:07

## 2021-04-23 RX ADMIN — ALBUTEROL SULFATE 2 PUFF: 90 AEROSOL, METERED RESPIRATORY (INHALATION) at 00:29

## 2021-04-23 RX ADMIN — ENOXAPARIN SODIUM 40 MG: 40 INJECTION SUBCUTANEOUS at 04:00

## 2021-04-23 RX ADMIN — ALBUTEROL SULFATE 2 PUFF: 90 AEROSOL, METERED RESPIRATORY (INHALATION) at 08:45

## 2021-04-23 RX ADMIN — FLUTICASONE PROPIONATE 2 SPRAY: 50 SPRAY, METERED NASAL at 00:16

## 2021-04-23 RX ADMIN — ASPIRIN 81 MG: 81 TABLET, COATED ORAL at 08:41

## 2021-04-23 NOTE — PROGRESS NOTES
Bedside shift change report given to Lauren Chong RN (oncoming nurse) by Altagracia Cherry RN (offgoing nurse). Report included the following information SBAR, Kardex, ED Summary, Intake/Output, MAR, Accordion, Recent Results, Med Rec Status, Cardiac Rhythm NSR and Alarm Parameters .

## 2021-04-23 NOTE — PROGRESS NOTES
Problem: Diabetes Self-Management  Goal: *Disease process and treatment process  Description: Define diabetes and identify own type of diabetes; list 3 options for treating diabetes. Outcome: Resolved/Met  Goal: *Incorporating nutritional management into lifestyle  Description: Describe effect of type, amount and timing of food on blood glucose; list 3 methods for planning meals. Outcome: Resolved/Met  Goal: *Incorporating physical activity into lifestyle  Description: State effect of exercise on blood glucose levels. Outcome: Resolved/Met  Goal: *Developing strategies to promote health/change behavior  Description: Define the ABC's of diabetes; identify appropriate screenings, schedule and personal plan for screenings. Outcome: Resolved/Met  Goal: *Using medications safely  Description: State effect of diabetes medications on diabetes; name diabetes medication taking, action and side effects. Outcome: Resolved/Met  Goal: *Monitoring blood glucose, interpreting and using results  Description: Identify recommended blood glucose targets  and personal targets. Outcome: Resolved/Met  Goal: *Prevention, detection, treatment of acute complications  Description: List symptoms of hyper- and hypoglycemia; describe how to treat low blood sugar and actions for lowering  high blood glucose level. Outcome: Resolved/Met  Goal: *Prevention, detection and treatment of chronic complications  Description: Define the natural course of diabetes and describe the relationship of blood glucose levels to long term complications of diabetes.   Outcome: Resolved/Met  Goal: *Developing strategies to address psychosocial issues  Description: Describe feelings about living with diabetes; identify support needed and support network  Outcome: Resolved/Met  Goal: *Insulin pump training  Outcome: Resolved/Met  Goal: *Sick day guidelines  Outcome: Resolved/Met  Goal: *Patient Specific Goal (EDIT GOAL, INSERT TEXT)  Outcome: Resolved/Met Problem: Patient Education: Go to Patient Education Activity  Goal: Patient/Family Education  Outcome: Resolved/Met     Problem: Falls - Risk of  Goal: *Absence of Falls  Description: Document Alejandro Maple Fall Risk and appropriate interventions in the flowsheet.   Outcome: Resolved/Met     Problem: Patient Education: Go to Patient Education Activity  Goal: Patient/Family Education  Outcome: Resolved/Met     Problem: Discharge Planning  Goal: *Discharge to safe environment  Description: See cm notes  Outcome: Resolved/Met  Goal: *Knowledge of medication management  Outcome: Resolved/Met  Goal: *Knowledge of discharge instructions  Outcome: Resolved/Met     Problem: Patient Education: Go to Patient Education Activity  Goal: Patient/Family Education  Outcome: Resolved/Met     Problem: High Risk or History of SHALOM  Goal: Recognition of SHALOM or High Risk for SHALOM  Outcome: Resolved/Met  Goal: Maintain Patent Airway and Adequate Oxygenation  Outcome: Resolved/Met  Goal: Avoid Over-sedation  Outcome: Resolved/Met  Goal: Maintenance Care of SHALOM  Outcome: Resolved/Met     Problem: Patient Education: Go to Patient Education Activity  Goal: Patient/Family Education  Outcome: Resolved/Met     Problem: Breathing Pattern - Ineffective  Goal: *Absence of hypoxia  Outcome: Resolved/Met  Goal: *Use of effective breathing techniques  Outcome: Resolved/Met  Goal: *PALLIATIVE CARE:  Alleviation of Dyspnea  Outcome: Resolved/Met     Problem: Patient Education: Go to Patient Education Activity  Goal: Patient/Family Education  Outcome: Resolved/Met     Problem: Breathing Pattern - Ineffective  Goal: *Absence of hypoxia  Outcome: Resolved/Met  Goal: *Use of effective breathing techniques  Outcome: Resolved/Met  Goal: *PALLIATIVE CARE:  Alleviation of Dyspnea  Outcome: Resolved/Met     Problem: Patient Education: Go to Patient Education Activity  Goal: Patient/Family Education  Outcome: Resolved/Met     Problem: Breathing Pattern - Ineffective  Goal: *Absence of hypoxia  Outcome: Resolved/Met  Goal: *Use of effective breathing techniques  Outcome: Resolved/Met  Goal: *PALLIATIVE CARE:  Alleviation of Dyspnea  Outcome: Resolved/Met     Problem: Patient Education: Go to Patient Education Activity  Goal: Patient/Family Education  Outcome: Resolved/Met     Problem: Breathing Pattern - Ineffective  Goal: *Absence of hypoxia  Outcome: Resolved/Met  Goal: *Use of effective breathing techniques  Outcome: Resolved/Met  Goal: *PALLIATIVE CARE:  Alleviation of Dyspnea  Outcome: Resolved/Met     Problem: Patient Education: Go to Patient Education Activity  Goal: Patient/Family Education  Outcome: Resolved/Met

## 2021-04-23 NOTE — PROGRESS NOTES
I have reviewed discharge instructions with the patient. The patient verbalized understanding. We discussed meds, MYESHA, visit overview. Tech wheeled pt down with O2 to main entrance and sent home with family  Member.

## 2021-04-23 NOTE — PROGRESS NOTES
Hospital follow-up Virtual PCP transitional care appointment has been scheduled with Dr. Leonor Escobedo for Wednesday, 4/28/21 at 11:15 a.m. Pending patient discharge.   Sushila Floyd, Care Management Specialist.

## 2021-04-23 NOTE — DISCHARGE INSTRUCTIONS
Discharge Instructions       PATIENT ID: Ryder Rodriges Sr. MRN: [id number]   YOB: 1975    DATE OF ADMISSION: 4/12/2021 11:55 AM    DATE OF DISCHARGE: 4/23/2021    PRIMARY CARE PROVIDER: Elana Jimenez MD     ATTENDING PHYSICIAN: Tessie Luu MD  DISCHARGING PROVIDER: Tommy French MD    To contact this individual call [phone] and ask the  to page. If unavailable ask to be transferred the Adult Hospitalist Department. DISCHARGE DIAGNOSES   COVID pneumonia    CONSULTATIONS: IP CONSULT TO PULMONOLOGY    PROCEDURES/SURGERIES: * No surgery found *    PENDING TEST RESULTS:   At the time of discharge the following test results are still pending: none    FOLLOW UP APPOINTMENTS:   Follow-up Information     Follow up With Specialties Details Why Contact Info    Elana Jimenez MD Internal Medicine In 1 week  [address]  [phone]             ADDITIONAL CARE RECOMMENDATIONS:   Follow up with PMD  Follow up with Pulmonary    DIET: Diabetic Diet    ACTIVITY: Activity as tolerated    DISCHARGE MEDICATIONS:   See Medication Reconciliation Form    · It is important that you take the medication exactly as they are prescribed. · Keep your medication in the bottles provided by the pharmacist and keep a list of the medication names, dosages, and times to be taken in your wallet. · Do not take other medications without consulting your doctor. NOTIFY YOUR PHYSICIAN FOR ANY OF THE FOLLOWING:   Fever over 101 degrees for 24 hours. Chest pain, shortness of breath, fever, chills, nausea, vomiting, diarrhea, change in mentation, falling, weakness, bleeding. Severe pain or pain not relieved by medications. Or, any other signs or symptoms that you may have questions about.       DISPOSITION:  x  Home With:   OT  PT  HH  RN       SNF/Inpatient Rehab/LTAC    Independent/assisted living    Hospice    Other:     CDMP Checked:   Yes x     PROBLEM LIST Updated:  Yes x       Signed:   Leeann Sanders MD  4/23/2021  9:37 AM

## 2021-04-23 NOTE — PROGRESS NOTES
Hospitalist Progress Note         Tessa Hendrix MD    Please call  and page for questions. Call physician on-call through the  7pm-7am    Daily Progress Note: 4/23/2021    Primary care provider:Jess Ashford MD    Date of admission: 4/12/2021 11:55 AM    Admission summery and hospital course:  39 y. o. male with PMH of DM 2 on insulin pump, HTN, HLD, SHALOM on CPAP at home nightly, came to ED on April 12 for evaluation of diarrhea going on for 5 days, associated nausea/ vomiting/ fever/ chills/ abdominal pain.  In the ED, patient was evaluated with COVID-19 rapid testing which turned positive. Subjective:   Acute respiratory failure  Now on 3l NC (from yesterday's 9l in am)    Assessment/Plan:   Acute hypoxic respiratory failure - slightly improving. On oxygen via [address] 9 l/min  COVID-19 virus pneumonia  Diarrhea, improved  Bilateral pneumonia   -S/p IV Actemra x 1 on 4/14. On prednisone  -He was evaluated for remdesivir but he was not a candidate due to elevated LFTs, borderline time duration of symptom onset, almost close to 7-day on 4/14 when started getting hypoxic   -Previous hospitalists discussed with Pulm about Plasma - Does not seem as a potential option due to timing in the course of illness  -appreciate pulmonology input  - On Bumex 1 mg daily since April 20.   - continue IMCU care, albuterol MDI, Flonase  -Continue monitoring inflammatory markers including D-dimer, CRP. -High risk for deterioration, continue close monitoring     DM2 with hyperglycemia and hypoglycemia  Patient is on insulin pump he is managing by himself. Patient was recommended to adjust his pump according to ongoing hypoglycemia.  Diabetic teams are on board. Hypertension, controlled  Continue losartan and follow.     Hyponatremia, related to dehydration - resolved   Mild MARYCARMEN - Resolved   Transaminases, likely viral related, improving,  continue to monitor.    HLD-statins are on hold due to transaminitis  SHALOM on CPAP at night. Tolerating well.     Morbid obesity: Counseling was provided about weight loss       VTE prophylaxis: Lovenox twice daily  Code status: Full code  Discussed plan of care with Patient/Family and Nurse. Pre-admission lived at home. Discharge planning: pending. High risk of deterioration       Review of Systems:     Review of Systems:  Symptom  Y/N  Comments   Symptom  Y/N  Comments    Fever/Chills  n    Chest Pain  n    Poor Appetite   n   Edema  n     Cough  y   Abdominal Pain   n    Sputum  n   Joint Pain      SOB/NORTH  n   Pruritis/Rash      Nausea/vomit  n   Tolerating PT/OT      Diarrhea     Tolerating Diet      Constipation     Other      Could not obtain due to:         Objective:   Physical Exam:     Visit Vitals  /71 (BP 1 Location: Right upper arm, BP Patient Position: At rest)   Pulse 93   Temp 97.5 °F (36.4 °C)   Resp 29   Ht 5' 10\" (1.778 m)   Wt 158 kg (348 lb 5.2 oz)   SpO2 96%   BMI 49.98 kg/m²    O2 Flow Rate (L/min): 3 l/min O2 Device: Nasal cannula    Temp (24hrs), Av.8 °F (36.6 °C), Min:97.5 °F (36.4 °C), Max:98 °F (36.7 °C)    No intake/output data recorded. No intake/output data recorded. General:  Alert and oriented, no in distress. Sitting in chair,wearing oxygen via [address] Lungs:   Clear to auscultation bilaterally. Heart:  Regular rate and rhythm, S1, S2 normal, no murmur. Abdomen:   Obese, soft, non-tender. Bowel sounds normal.    Extremities: Extremities normal, atraumatic, no cyanosis or edema. Neurologic:  Patient has clear voice and he talks appropriately. Patient can move his extremities equally against gravity.      Data Review:       Recent Days:  Recent Labs     21  0014 21  0358 21  0418   WBC 14.4* 14.1* 20.9*   HGB 12.8 12.6 14.3   HCT 38.5 38.2 43.2    309 439*     Recent Labs     21  0418      K 4.3      CO2 27   GLU 52*   BUN 28*   CREA 0.92   CA 8.5     No results for input(s): PH, PCO2, PO2, HCO3, FIO2 in the last 72 hours.     24 Hour Results:  Recent Results (from the past 24 hour(s))   GLUCOSE, POC    Collection Time: 04/22/21 12:59 PM   Result Value Ref Range    Glucose (POC) 222 (H) 65 - 100 mg/dL    Performed by Renetta Grimes    GLUCOSE, POC    Collection Time: 04/22/21  4:38 PM   Result Value Ref Range    Glucose (POC) 212 (H) 65 - 100 mg/dL    Performed by Jonny Lantigua [address], POC    Collection Time: 04/22/21  9:25 PM   Result Value Ref Range    Glucose (POC) 196 (H) 65 - 100 mg/dL    Performed by Brayan Albrecht    D DIMER    Collection Time: 04/23/21 12:14 AM   Result Value Ref Range    D-dimer 0.96 (H) 0.00 - 0.65 mg/L FEU   C REACTIVE PROTEIN, QT    Collection Time: 04/23/21 12:14 AM   Result Value Ref Range    C-Reactive protein <0.29 0.00 - 0.60 mg/dL   NT-PRO BNP    Collection Time: 04/23/21 12:14 AM   Result Value Ref Range    NT pro-BNP 21 <125 PG/ML   CBC W/O DIFF    Collection Time: 04/23/21 12:14 AM   Result Value Ref Range    WBC 14.4 (H) 4.1 - 11.1 K/uL    RBC 4.55 4.10 - 5.70 M/uL    HGB 12.8 12.1 - 17.0 g/dL    HCT 38.5 36.6 - 50.3 %    MCV 84.6 80.0 - 99.0 FL    MCH 28.1 26.0 - 34.0 PG    MCHC 33.2 30.0 - 36.5 g/dL    RDW 12.2 11.5 - 14.5 %    PLATELET 023 132 - 132 K/uL    MPV 8.8 (L) 8.9 - 12.9 FL    NRBC 0.0 0  WBC    ABSOLUTE NRBC 0.00 0.00 - 0.01 K/uL   GLUCOSE, POC    Collection Time: 04/23/21  8:39 AM   Result Value Ref Range    Glucose (POC) 66 65 - 100 mg/dL    Performed by [address] [place] [address], POC    Collection Time: 04/23/21  8:54 AM   Result Value Ref Range    Glucose (POC) 112 (H) 65 - 100 mg/dL    Performed by Lashonda Fountain        Problem List:  Problem List as of 4/23/2021 Date Reviewed: 4/23/2021          Codes Class Noted - Resolved    Hyponatremia ICD-10-CM: E87.1  ICD-9-CM: 276.1  4/12/2021 - Present        Acute respiratory failure with hypoxemia ([facility] ICD-10-CM: J96.01  ICD-9-CM: 518.81  4/12/2021 - Present        * (Principal) Pneumonia due to COVID-19 virus ICD-10-CM: U07.1, J12.82  ICD-9-CM: 480.8, 079.89  4/12/2021 - Present        SHALOM on CPAP ICD-10-CM: G47.33, Z99.89  ICD-9-CM: 327.23, V46.8  11/25/2019 - Present        Obesity, morbid ([facility] ICD-10-CM: E66.01  ICD-9-CM: 278.01  1/18/2018 - Present        Retinopathy due to secondary diabetes [facility]) ICD-10-CM: E13.319  ICD-9-CM: 249.50, 362.01  11/8/2016 - Present        Diabetes mellitus due to underlying condition with mild nonproliferative diabetic retinopathy without macular edema ([facility] ICD-10-CM: C92.6854  ICD-9-CM: 249.50, 362.04  5/29/2015 - Present        Wound of right leg ICD-10-CM: S81.801A  ICD-9-CM: 891.0  5/7/2013 - Present        Cellulitis and abscess of leg ICD-10-CM: L03.119, L02.419  ICD-9-CM: 682.6  5/7/2013 - Present        DM (diabetes mellitus) ([facility] ICD-10-CM: E11.9  ICD-9-CM: 250.00  6/1/2010 - Present    Overview Addendum 11/15/2018  8:54 AM by Alberto Zafar MD     Pump basal  6.2  Up basal to 4 units at  Night, up to 5.2 (1/2015)    Bolus  20 to 25   Effective 5/15   15 to 20             HLD (hyperlipidemia) ICD-10-CM: E78.5  ICD-9-CM: 272.4  6/1/2010 - Present        HTN (hypertension) ICD-10-CM: I10  ICD-9-CM: 401.9  6/1/2010 - Present              Medications reviewed  Current Facility-Administered Medications   Medication Dose Route Frequency    predniSONE (DELTASONE) tablet 40 mg  40 mg Oral DAILY WITH BREAKFAST    bumetanide (BUMEX) injection 1 mg  1 mg IntraVENous DAILY    albuterol (PROVENTIL HFA, VENTOLIN HFA, PROAIR HFA) inhaler 2 Puff  2 Puff Inhalation Q4H RT    fluticasone propionate (FLONASE) 50 mcg/actuation nasal spray 2 Spray  2 Spray Both Nostrils ACB/HS    sodium chloride (OCEAN) 0.65 % nasal squeeze bottle 2 Spray  2 Spray Both Nostrils Q2H PRN    aspirin delayed-release tablet 81 mg  81 mg Oral DAILY    losartan (COZAAR) tablet 100 mg  100 mg Oral DAILY    insulin pump (PATIENT SUPPLIED)   SubCUTAneous PRN    sodium chloride (NS) flush 5-10 mL  5-10 mL IntraVENous PRN    sodium chloride (NS) flush 5-40 mL  5-40 mL IntraVENous Q8H    sodium chloride (NS) flush 5-40 mL  5-40 mL IntraVENous PRN    acetaminophen (TYLENOL) tablet 650 mg  650 mg Oral Q6H PRN    Or    acetaminophen (TYLENOL) suppository 650 mg  650 mg Rectal Q6H PRN    polyethylene glycol (MIRALAX) packet 17 g  17 g Oral DAILY PRN    promethazine (PHENERGAN) tablet 12.5 mg  12.5 mg Oral Q6H PRN    Or    ondansetron (ZOFRAN) injection 4 mg  4 mg IntraVENous Q6H PRN    cholecalciferol (VITAMIN D3) (1000 Units /25 mcg) tablet 2,000 Units  2,000 Units Oral DAILY    guaiFENesin-dextromethorphan (ROBITUSSIN DM) 100-10 mg/5 mL syrup 5 mL  5 mL Oral Q4H PRN    benzonatate (TESSALON) capsule 200 mg  200 mg Oral TID PRN    therapeutic multivitamin (THERAGRAN) tablet 1 Tab  1 Tab Oral DAILY    enoxaparin (LOVENOX) injection 40 mg  40 mg SubCUTAneous Q12H    glucose chewable tablet 16 g  4 Tab Oral PRN    dextrose (D50W) injection syrg 12.5-25 g  25-50 mL IntraVENous PRN    glucagon (GLUCAGEN) injection 1 mg  1 mg IntraMUSCular PRN       Care Plan discussed with: Patient/Family and Nurse      Nino Pierson MD

## 2021-04-23 NOTE — DISCHARGE SUMMARY
Discharge Summary       PATIENT ID: Bryant Mcdonnell Sr. MRN: [id number]   YOB: 1975    DATE OF ADMISSION: 4/12/2021 11:55 AM    DATE OF DISCHARGE: 4/23/2021   PRIMARY CARE PROVIDER: Pablo Houston MD     ATTENDING PHYSICIAN: Dr Love Savage  DISCHARGING PROVIDER: Love Savage MD    To contact this individual call [phone] and ask the  to page. If unavailable ask to be transferred the Adult Hospitalist Department. CONSULTATIONS: IP CONSULT TO PULMONOLOGY    PROCEDURES/SURGERIES: * No surgery found *    ADMITTING [address] [place] [address] COURSE:   Acute hypoxic respiratory failure - slightly improving. On oxygen via [address] 9 l/min  COVID-19 virus pneumonia  Diarrhea, improved  Bilateral pneumonia   -S/p IV Actemra x 1 on 4/14. On prednisone  -He was evaluated for remdesivir but he was not a candidate due to elevated LFTs, borderline time duration of symptom onset, almost close to 7-day on 4/14 when started getting hypoxic   -Previous hospitalists discussed with Pulm about Plasma - Does not seem as a potential option due to timing in the course of illness  -appreciate pulmonology input  - On Bumex 1 mg daily since April 20, will discontinue and restart home med  -Will discharge the patient home on home oxygen, the patient agreeable    DM2 with hyperglycemia and hypoglycemia  Patient is on insulin pump he is managing by himself. Patient was recommended to adjust his pump according to ongoing hypoglycemia.  Diabetic teams are on board.     Hypertension, controlled  Continue losartan and follow.     Hyponatremia, related to dehydration - resolved   Mild MARYCARMEN - Resolved   Transaminases, likely viral related, improving,  continue to monitor.   HLD-statins are on hold due to transaminitis  SHALOM on CPAP at night. Tolerating well.     Morbid obesity: Counseling was provided about weight loss                DISCHARGE DIAGNOSES / PLAN:      1.   COVID pneumonia     ADDITIONAL CARE RECOMMENDATIONS: Follow up with PMD  Follow up with Pulmonary     PENDING TEST RESULTS:   At the time of discharge the following test results are still pending: none    FOLLOW UP APPOINTMENTS:    Follow-up Information     Follow up With Specialties Details Why Contact Info    Lisa Figueroa MD Internal Medicine In 1 week  [address]  [phone]               DIET: Diabetic Diet    ACTIVITY: Activity as tolerated      DISCHARGE MEDICATIONS:  Current Discharge Medication List      START taking these medications    Details   predniSONE (DELTASONE) 20 mg tablet Take 40 mg by mouth daily (with breakfast). Qty: 10 Tab, Refills: 0         CONTINUE these medications which have NOT CHANGED    Details   insulin pump (PATIENT SUPPLIED) misc by SubCUTAneous route continuous. metFORMIN ER (GLUCOPHAGE XR) 500 mg tablet TAKE 2 TABLETS WITH BREAKFAST AND DINNER  Qty: 360 Tab, Refills: 3    Comments: YOUR PATIENT HAS REQUESTED A REFILL OF THIS MEDICATION, PREVIOUSLY AUTHORIZED BY ANOTHER PRESCRIBER. atorvastatin (LIPITOR) 10 mg tablet TAKE 1 TABLET DAILY  Qty: 90 Tab, Refills: 3      multivitamin (DAILY MULTI-VITAMIN) tablet Take 1 Tab by mouth daily. aspirin 81 mg tablet Take 81 mg by mouth daily. chlorthalidone (HYGROTON) 25 mg tablet TAKE 1 TABLET DAILY  Qty: 90 Tab, Refills: 3      losartan (COZAAR) 100 mg tablet TAKE 1 TABLET DAILY  Qty: 90 Tab, Refills: 3               NOTIFY YOUR PHYSICIAN FOR ANY OF THE FOLLOWING:   Fever over 101 degrees for 24 hours. Chest pain, shortness of breath, fever, chills, nausea, vomiting, diarrhea, change in mentation, falling, weakness, bleeding. Severe pain or pain not relieved by medications. Or, any other signs or symptoms that you may have questions about.     DISPOSITION:  x  Home With:   OT  PT  HH  RN       Long term SNF/Inpatient Rehab    Independent/assisted living    Hospice    Other:       PATIENT CONDITION AT DISCHARGE: Functional status    Poor     Deconditioned    x Independent      Cognition   x Lucid     Forgetful     Dementia      Catheters/lines (plus indication)    Cleary     PICC     PEG    x None      Code status    x Full code     DNR      PHYSICAL EXAMINATION AT DISCHARGE:  Please see progress note      CHRONIC MEDICAL DIAGNOSES:  Problem List as of 4/23/2021 Date Reviewed: 4/23/2021          Codes Class Noted - Resolved    Hyponatremia ICD-10-CM: E87.1  ICD-9-CM: 276.1  4/12/2021 - Present        Acute respiratory failure with hypoxemia ([facility] ICD-10-CM: J96.01  ICD-9-CM: 518.81  4/12/2021 - Present        * (Principal) Pneumonia due to COVID-19 virus ICD-10-CM: U07.1, J12.82  ICD-9-CM: 480.8, 079.89  4/12/2021 - Present        SHALOM on CPAP ICD-10-CM: G47.33, Z99.89  ICD-9-CM: 327.23, V46.8  11/25/2019 - Present        Obesity, morbid ([facility] ICD-10-CM: E66.01  ICD-9-CM: 278.01  1/18/2018 - Present        Retinopathy due to secondary diabetes ([facility] ICD-10-CM: E13.319  ICD-9-CM: 249.50, 362.01  11/8/2016 - Present        Diabetes mellitus due to underlying condition with mild nonproliferative diabetic retinopathy without macular edema ([facility] ICD-10-CM: H04.8007  ICD-9-CM: 249.50, 362.04  5/29/2015 - Present        Wound of right leg ICD-10-CM: S81.801A  ICD-9-CM: 891.0  5/7/2013 - Present        Cellulitis and abscess of leg ICD-10-CM: L03.119, L02.419  ICD-9-CM: 682.6  5/7/2013 - Present        DM (diabetes mellitus) ([facility] ICD-10-CM: E11.9  ICD-9-CM: 250.00  6/1/2010 - Present    Overview Addendum 11/15/2018  8:54 AM by Isidoro Sebastian MD     Pump basal  6.2  Up basal to 4 units at  Night, up to 5.2 (1/2015)    Bolus  20 to 25   Effective 5/15   15 to 20             HLD (hyperlipidemia) ICD-10-CM: E78.5  ICD-9-CM: 272.4  6/1/2010 - Present        HTN (hypertension) ICD-10-CM: I10  ICD-9-CM: 401.9  6/1/2010 - Present              Greater than 36 minutes were spent with the patient on counseling and coordination of care    Signed:   Kenna Rodriges MD  4/23/2021  9:37 AM   .

## 2021-04-23 NOTE — PROGRESS NOTES
Pulse oximetry assessment   88% at rest on room air (if 88% or less, skip next steps)    Put pt on 3L via NC and he is saturating at 95%

## 2021-04-23 NOTE — PROGRESS NOTES
Transition Plan of Care  RUR 15%-Low  Disposition -plan to discharge home today with oxygen. Referral sent via allscripts to [facility] and [place]. Waiting acceptance. Patient will need a portable to transport home. No other skilled CM needs. Harmony Irizarry RN CRM  Ext [phone]    Update [facility] has accepted and a portable tank has been delivery to patient's room. Charge nurse made aware that he is ready to discharge.

## 2021-04-23 NOTE — PROGRESS NOTES
Problem: Falls - Risk of 
Goal: *Absence of Falls Description: Document Jared Del Cid Fall Risk and appropriate interventions in the flowsheet. Outcome: Progressing Towards Goal 
Note: Fall Risk Interventions: 
Pt remains free of falls during admission. Call bell and frequently used items within reach. Bedside table within reach. Pt provided nonskid socks and instructed to call out for nurse when in need of assistance Problem: High Risk or History of SHALOM Goal: Recognition of SHALOM or High Risk for SHALOM Outcome: Progressing Towards Goal 
  
Problem: Breathing Pattern - Ineffective Goal: *Absence of hypoxia Outcome: Progressing Towards Goal 
Goal: *Use of effective breathing techniques Outcome: Progressing Towards Goal 
Pt on 3L and cpap at night. Will continue to wean. No s/s of respiratory distress. Q1hr rounding performed throughout shift Bedside shift change report given to Del Kahn (oncoming nurse) by Lv Rodriguez (offgoing nurse). Report included the following information SBAR, Intake/Output, MAR, Recent Results, and Cardiac Rhythm SR .

## 2021-04-23 NOTE — DIABETES MGMT
[address] [facility]    CLINICAL NURSE SPECIALIST CONSULT   FOLLOW UP NOTE    Initial Presentation   Andrzej Serrano is a 39 y.o. male who presents to the ED 4/12/21 with a 4 day complaint of diarrhea with abdominal pain. He had a rapid COVID-19 test which was positive    HX:   Past Medical History:   Diagnosis Date    DM (diabetes mellitus) ([facility] Age 32    HLD (hyperlipidaemia)     HTN         DX: Gastritis, COVID-19 pneumonia    TX: IV steroids/IV antibiotics     Hospital course   Clinical progress has been uncomplicated. Diabetes    Patient has known Type 2 diabetes, treated with Metformin and Humalog PTA. Family history positive for diabetes: Mother with Type 2 Diabetes     Admission  and A1c 9.3% indicate poor diabetes control. Ambulatory blood glucose management provided by endocrinologist: Sara Lee MD with [facility].     Consulted by Xiomy Tyson MD for advanced diabetes nursing assessment and care, specifically related to    [x] Inpatient management strategy      Diabetes-related medical history  Acute complications: Hyperglycemia  Neurological complications  Peripheral neuropathy  Microvascular disease: None  Macrovascular disease: None      Diabetes medication history  Drug class Currently in use Discontinued Never used   Biguanide Metformin 500mg at breakfast and dinner     DDP-4 inhibitor       Sulfonylurea      Thiazolidinedione      GLP-1 RA      SGLT-2 inhibitors      Basal insulin      Bolus insulin      Fixed Dose  Combinations Insulin pump via settings below       Pump settings:  - basal: 12a: 6.35 units/hr   - Carb ratio: 1:3  - sensitivity: 10  - target: 100-120  - active insulin time: 3 hr  Subjective   On 9L O2 with humidification- plans to discharge home today with home O2  A1C 9.4%  Blood cultures with NGTD  Steroids weaned to prednisone 40mg daily- will discharge home on 10 days of prednisone  CRP very low- less than 0.29  S/p actremra 4/14    Continues on insulin pump  Fasting glucose 165  24 hr glucose pattern: 142-172  Basal rate: 6.35 units/hr from 9p-9a and increased to 8.35 from 9a-9p    4/21/21:  855 16.65 units for breakfast  142p 16.65 units for lunch  602p 19 units for dinner  951p 11.6 units for bedtime snack    4/22/21  10a 21 units for breakfast  114p: 22 units for lunch  441 bg 192 7.2 units for correction  628: 21.6 units for dinner  844: 22 units for bedtime snack    4/23/21  15.3 units for breakfast  Objective   Physical exam  General Alert, oriented and in no acute distress/ill-appearing. Conversant and cooperative. Vital Signs   Visit Vitals  /71 (BP 1 Location: Right upper arm, BP Patient Position: At rest)   Pulse 93   Temp 97.5 °F (36.4 °C)   Resp 29   Ht 5' 10\" (1.778 m)   Wt 158 kg (348 lb 5.2 oz)   SpO2 96%   BMI 49.98 kg/m²     Skin  Warm and dry. Acanthosis noted along neckline. No lipohypertrophy or lipoatrophy noted at injection sites   Heart   Regular rate and rhythm. No murmurs, rubs or gallops  Lungs  Clear to auscultation without rales or rhonchi  Extremities No foot wounds      Laboratory      CBC W/O DIFF    Collection Time: 04/23/21 12:14 AM   Result Value Ref Range    WBC 14.4 (H) 4.1 - 11.1 K/uL    RBC 4.55 4.10 - 5.70 M/uL    HGB 12.8 12.1 - 17.0 g/dL    HCT 38.5 36.6 - 50.3 %    MCV 84.6 80.0 - 99.0 FL    MCH 28.1 26.0 - 34.0 PG    MCHC 33.2 30.0 - 36.5 g/dL    RDW 12.2 11.5 - 14.5 %    PLATELET 256 854 - 947 K/uL    MPV 8.8 (L) 8.9 - 12.9 FL    NRBC 0.0 0  WBC    ABSOLUTE NRBC 0.00 0.00 - 0.01 K/uL     No results found for this visit on 04/12/21 (from the past 12 hour(s)). All inpatient labs reviewed in full    Factors impacting BG management  Factor Dose Comments   Nutrition:  Carb-controlled meals     60 grams/meal      Drugs:  Steroids   Prednisone 40mg daily Insulin coverage with prednisone (in addition to basal insulin).   40 mg prednisone = 0.4 units/kg NPH to be given with prednisone     30 mg prednisone= 0.3 units/kg NPH to be given with prednisone     20 mg prednisone= 0.2 units/kg NPH to be given with prednisone     10 mg prednisone= 0.1 units/kg NPH to be given with prednisone        Infection/COVID-19  Hyperglycemia in the setting of COVID-19 infection is associated with a blunted immune response and delayed recovery. The subcutaneous insulin order set can be initiated to optimize his blood glucose control in the impatient setting. Blood glucose pattern        Assessment and Plan   Nursing Diagnosis Risk for unstable blood glucose pattern   Nursing Intervention Domain 525 Decision-making Support   Nursing Interventions Examined current inpatient diabetes control   Explored factors facilitating and impeding inpatient management  Identified self-management practices impeding diabetes control  Explored corrective strategies with patient and responsible inpatient provider   Informed patient of rational for insulin strategy while hospitalized     Evaluation   This  gentleman, with Type 2 diabetes, on very high dose insulin via Medtronic MiniMed insulin pump did not achieve diabetes control prior to admission, as evidenced by admission BG of 397 and A1c of 9.3%. During this hospitalization, his insulin pump has been resumed and the patient has nearly achieved inpatient blood glucose target of 100-180mg/dl requiring up to 350 units of insulin/day. Several factors have played a role in blood glucose management including:  [x] Critical nature of illness state  [x] Glucocorticoid use    Insulin pump therapy has been in use for duration of hospital stay. The PTA pump settings and additional 25 unit bolus per 40 mg Solumedrol was largely controlling blood glucose. Now that solu-medrol was weaned to prednisone- glucose will rise for 12 hrs-15 hrs with each dose.   Basal insulin was increased for 12hr following each prednisone dose instead of 25 unit bolus. Blood glucose has been in goal with above plan. He is to be discharged today with home O2 and 10 days of 40mg prednisone. Recommendations   Patient to discharge today. Will continue temporary basal dose of additional 2 units/hr from 9a-9p with each dose of 40mg prednisone. Patient will attach medtronic CGM upon discharge home    Patient to reach out to home endocrinologists for insulin dose adjustments if steroid plan changes or concerns for blood glucose control. Billing Code(s)   [x] 95058      Before making these care recommendations, I personally reviewed the hosptialization record, including laboratory and diagnostic data, medications and examined the patient at bedside (circumstances permitting).   Total minutes: 21    Fabiola Monreal CNS  Diabetes Clinical Nurse Specialist  [facility]  Access via [employer]

## 2021-04-24 ENCOUNTER — PATIENT OUTREACH (OUTPATIENT)
Dept: CASE MANAGEMENT | Age: 46
End: 2021-04-24

## 2021-04-24 NOTE — PROGRESS NOTES
Patient contacted regarding [id number] diagnosis\". Discussed COVID-19 related testing which was not done at this time. Test results were not done. Patient informed of results, if available? n/a     LPN Care Coordinator contacted the patient by telephone to perform post discharge assessment. Call within 2 business days of discharge: Yes Verified name and  with patient as identifiers. Provided introduction to self, and explanation of the CTN/ACM role, and reason for call due to risk factors for infection and/or exposure to COVID-19. Symptoms reviewed with patient who verbalized the following symptoms: no worsening symptoms      Due to no new or worsening symptoms encounter was not routed to provider for escalation. Discussed follow-up appointments. If no appointment was previously scheduled, appointment scheduling offered:  [facility] follow up appointment(s):   Future Appointments   Date Time Provider Elena Callahan   2021 11:15 AM Kavitha Petit MD [facility] BS AMB   2021  8:30 AM Lavelle Martinez MD RDE HILARIA [address] BS [facility]     Non-[facility] follow up appointment(s): n/a     Advance Care Planning:   Does patient have an Advance Directive:  decision maker updated. Patient has following risk factors of: diabetes. LPN CC reviewed discharge instructions, medical action plan and red flags such as increased shortness of breath, increasing fever and signs of decompensation with patient who verbalized understanding. Discussed exposure protocols and quarantine with CDC Guidelines What to do if you are sick with coronavirus disease .  Patient was given an opportunity for questions and concerns. The patient agrees to contact the [facility] exposure line [phone], local [facility] department [facility]  [phone] and PCP office for questions related to their healthcare. LPN CC provided contact information for future needs.     Reviewed and educated patient on any new and changed medications related to discharge diagnosis     Was patient discharged with a pulse oximeter? no Discussed and confirmed pulse oximeter discharge instructions and when to notify provider or seek emergency care. Plan for follow-up call in 7-14 days based on severity of symptoms and risk factors.

## 2021-04-24 NOTE — ACP (ADVANCE CARE PLANNING)
Advance Care Planning   Healthcare Decision Maker:       Primary Decision Maker: Dewayne Wheeler - [phone]    Click here to complete [address] including selection of the Healthcare Decision Maker Relationship (ie \"Primary\")

## 2021-04-28 ENCOUNTER — VIRTUAL VISIT (OUTPATIENT)
Dept: INTERNAL MEDICINE CLINIC | Age: 46
End: 2021-04-28
Payer: COMMERCIAL

## 2021-04-28 DIAGNOSIS — J12.82 PNEUMONIA DUE TO COVID-19 VIRUS: Primary | ICD-10-CM

## 2021-04-28 DIAGNOSIS — U07.1 PNEUMONIA DUE TO COVID-19 VIRUS: Primary | ICD-10-CM

## 2021-04-28 PROCEDURE — 99213 OFFICE O/P EST LOW 20 MIN: CPT | Performed by: INTERNAL MEDICINE

## 2021-04-28 NOTE — PROGRESS NOTES
ADMITTING DIAGNOSES & HOSPITAL COURSE:   Acute hypoxic respiratory failure - slightly improving. On oxygen via [address] 9 l/min  COVID-19 virus pneumonia  Diarrhea, improved  Bilateral pneumonia   -S/p IV Actemra x 1 on 4/14. On prednisone also  -He was evaluated for remdesivir but he was not a candidate due to elevated LFTs, borderline time duration of symptom onset, almost close to 7-day on 4/14 when started getting hypoxic   -Previous hospitalists discussed with Pulm about Plasma - Does not seem as a potential option due to timing in the course of illness  -appreciate pulmonology input  - On Bumex 1 mg daily since April 20, will discontinue and restart home med  -Will discharge the patient home on home oxygen, the patient agreeable     DM2 with hyperglycemia and hypoglycemia  Patient is on insulin pump he is managing by himself. Mandi Jaxoneleuterio was recommended to adjust his pump according to ongoing hypoglycemia.  Diabetic teams are on board.     Hypertension, controlled  Continue losartan and follow.     Hyponatremia, related to dehydration - resolved   Mild MARYCARMEN - Resolved   Transaminases, likely viral related, improving,  continue to monitor.   HLD-statins are on hold due to transaminitis  SHALOM on CPAP at night. Tolerating well.     Morbid obesity: Counseling was provided about weight loss      home 5 days  In hospital for 10 days    90 to 96 on o2  If exerts 89%  On RA on 2 l prn  Glucose ok since home (163)     Home BP OK p[ulse high    Mr. Chema Moore is a 39 y.o. black male with history of diabetes, hypertension, on an insulin pump, developed COVID-19, was home for about a week and then got hospitalized for hypoxia. Spent more than a week in the hospital, was treated with IV Actemra and steroids. He was not given convalescent plasma, and he was also not given remdesivir. He got better. He normally wears CPAP at night, and he has continued that. His transaminases were elevated.   His chest x-ray showed bilateral fluffy infiltrates consistent with COVID pneumonia. He has been home for five days. He is wearing oxygen sometimes. It is 2 L. He is wearing it not as much as he could. He actually went to a store yesterday and did okay. He gets short of breath, and his O2 sats may zap down to 89 when he walks up a flight of stairs. He is mostly not short of breath at rest.  He said his wife got COVID, but she is fine. His blood pressure has been staying normal.  His blood sugars, he said, have been really running well, using his insulin pump, and he feels well. He has had no fever or chills. He is sleeping. He is having no nausea, vomiting, or diarrhea. His vitals have been stable. He looks well on the video visit today using [employer]. I recommended repeating his chest x-ray next week. Consider going back to work. He does physical activity and exertion. He probably has to wait until he is able to do more without hypoxia or he could consider going back to work with oxygen. 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 If he can go back part time, then that would be fine. If he can go back full time, that he probably needs to wait until next week and get a chest x-ray, which he will go get at [facility] sometime next week. The orders were put in, and then a decision can be made. He is open that he can go without oxygen, but I told him he will need to be able to do physical exertion to some extent, and he thinks he will be able to. He will touch bases with me next week and will make a better decision. Diagnoses and all orders for this visit:    1. Pneumonia due to COVID-19 virus  -     XR CHEST PA LAT; Future    THIS IS A VIRTUAL VISIT USING DOXY. ME SOFTWARE    High risk medications reviewed

## 2021-04-28 NOTE — PROGRESS NOTES
1. Have you been to the ER, urgent care clinic since your last visit? Hospitalized since your last visit? See below    2. Have you seen or consulted any other health care providers outside of the [address] [place] [address] since your last visit? Include any pap smears or colon screening.  No     Chief Complaint   Patient presents with   [facility] Follow Up     went to [facility] related to Rachel Hayden     Please send link to [phone]

## 2021-05-05 ENCOUNTER — HOSPITAL ENCOUNTER (OUTPATIENT)
Dept: GENERAL RADIOLOGY | Age: 46
Discharge: HOME | End: 2021-05-05
Payer: COMMERCIAL

## 2021-05-05 DIAGNOSIS — U07.1 PNEUMONIA DUE TO COVID-19 VIRUS: ICD-10-CM

## 2021-05-05 DIAGNOSIS — J12.82 PNEUMONIA DUE TO COVID-19 VIRUS: ICD-10-CM

## 2021-05-05 PROCEDURE — 71046 X-RAY EXAM CHEST 2 VIEWS: CPT

## 2021-05-11 ENCOUNTER — PATIENT OUTREACH (OUTPATIENT)
Dept: CASE MANAGEMENT | Age: 46
End: 2021-05-11

## 2021-05-11 NOTE — PROGRESS NOTES
Patient resolved from [address] Transitions episode on 05/11/21. Discussed COVID-19 related testing which was not done at this time. Test results were not done. Patient/family has been provided the following resources and education related to COVID-19:                         Signs, symptoms and red flags related to COVID-19            CDC exposure and quarantine guidelines            [facility] exposure contact - [phone]            Contact for their local Department of Health                 Patient currently reports that the following symptoms have improved:  no worsening symptoms. No further outreach scheduled with this CTN/ACM/LPN/HC/ MA. Episode of Care resolved. Patient has this CTN/ACM/LPN/HC/MA contact information if future needs arise.

## 2021-05-18 DIAGNOSIS — U07.1 COVID-19 WITH PULMONARY COMORBIDITY: Primary | ICD-10-CM

## 2021-05-18 DIAGNOSIS — J98.4 COVID-19 WITH PULMONARY COMORBIDITY: Primary | ICD-10-CM

## 2021-05-21 ENCOUNTER — VIRTUAL VISIT (OUTPATIENT)
Dept: ENDOCRINOLOGY | Age: 46
End: 2021-05-21
Payer: COMMERCIAL

## 2021-05-21 DIAGNOSIS — I10 ESSENTIAL HYPERTENSION: ICD-10-CM

## 2021-05-21 DIAGNOSIS — Z79.4 DIABETES MELLITUS DUE TO UNDERLYING CONDITION WITH MILD NONPROLIFERATIVE RETINOPATHY WITHOUT MACULAR EDEMA, WITH LONG-TERM CURRENT USE OF INSULIN, UNSPECIFIED LATERALITY (HCC): Primary | ICD-10-CM

## 2021-05-21 DIAGNOSIS — E78.00 PURE HYPERCHOLESTEROLEMIA: ICD-10-CM

## 2021-05-21 DIAGNOSIS — E08.3299 DIABETES MELLITUS DUE TO UNDERLYING CONDITION WITH MILD NONPROLIFERATIVE RETINOPATHY WITHOUT MACULAR EDEMA, WITH LONG-TERM CURRENT USE OF INSULIN, UNSPECIFIED LATERALITY (HCC): Primary | ICD-10-CM

## 2021-05-21 PROCEDURE — 99214 OFFICE O/P EST MOD 30 MIN: CPT | Performed by: INTERNAL MEDICINE

## 2021-05-21 RX ORDER — INSULIN LISPRO 100 [IU]/ML
INJECTION, SOLUTION INTRAVENOUS; SUBCUTANEOUS
COMMUNITY
Start: 2021-05-19 | End: 2021-11-15 | Stop reason: SDUPTHER

## 2021-05-21 RX ORDER — ATORVASTATIN CALCIUM 10 MG/1
TABLET, FILM COATED ORAL
Qty: 90 TABLET | Refills: 3
Start: 2021-05-21 | End: 2022-02-28

## 2021-05-21 NOTE — PROGRESS NOTES
Chief Complaint   Patient presents with   [employer] Diabetes     [phone] doxy-Iphone       **THIS IS A VIRTUAL VISIT VIA A VIDEO SYNCHRONOUS DISCUSSION. PATIENT AGREED TO HAVE THEIR CARE DELIVERED OVER A DOXY. ME VIDEO VISIT IN PLACE OF THEIR REGULARLY SCHEDULED OFFICE VISIT**    History of Present Illness: Bryant Mcdonnell Sr. is a 39 y.o. male here for follow up of diabetes. Was hospitalized for 11 days last month with COVID pneumonia and was treated with Actemra and prednisone. He was allowed to use his pump during the stay and gave extra boluses to cover the steroids. Prior to the admission had been drinking a lot of gatorade to try and stay hydrated and this was driving his sugars up along with the infection to explain the rise in A1c. His sugars were starting to run low after he got off the steroids after discharge so he decreased th basal rate from 6.35 to 6.0. Fasting sugar was 90 this morning and most are in the  range. During the day has been up to 200 at the highest but has had some sugars in the 80s recently. Has been reading labels more carefully to count carbs and has cut back on portions. Weight dropped from 374 in 11/20 down to 348 currently. Has been taking 1/2 tab of lipitor 3x/week since last visit. Current pump settings are as follows:  - basal: 12a: 6 units/hr  - Carb ratio: 3  - sensitivity: 10  - target: 100-120  - active insulin time: 3 hr      Current Outpatient Medications   Medication Sig    HumaLOG U-100 Insulin 100 unit/mL injection     atorvastatin (LIPITOR) 10 mg tablet TAKE 1/2 TAB 3x/week--Dose change 5/21/21--updated med list--did not send prescription to the pharmacy    chlorthalidone (HYGROTON) 25 mg tablet TAKE 1 TABLET DAILY    losartan (COZAAR) 100 mg tablet TAKE 1 TABLET DAILY    insulin pump (PATIENT SUPPLIED) [facility] by SubCUTAneous route continuous.     metFORMIN ER (GLUCOPHAGE XR) 500 mg tablet TAKE 2 TABLETS WITH BREAKFAST AND DINNER    multivitamin (DAILY MULTI-VITAMIN) tablet Take 1 Tab by mouth daily.  aspirin 81 mg tablet Take 81 mg by mouth daily. No current facility-administered medications for this visit. Allergies   Allergen Reactions    Lisinopril Cough     Review of Systems: PER HPI    Physical Examination:  - GENERAL: NCAT, Appears well nourished   - EYES: EOMI, non-icteric, no proptosis   - Ear/Nose/Throat: NCAT, no visible inflammation or masses   - CARDIOVASCULAR: no cyanosis, no visible JVD   - RESPIRATORY: respiratory effort normal without any distress or labored breathing   - MUSCULOSKELETAL: Normal ROM of neck and upper extremities observed   - SKIN: No rash on face  - NEUROLOGIC:  No facial asymmetry (Cranial nerve 7 motor function), No gaze palsy   - PSYCHIATRIC: Normal affect, Normal insight and judgement       Data Reviewed:   Component      Latest Ref Rng & Units 4/13/2021           4:05 AM   Hemoglobin A1c, (calculated)      4.0 - 5.6 % 9.4 (H)   Est. average glucose      mg/dL 223     Component      Latest Ref Rng & Units 4/21/2021           4:18 AM   Sodium      136 - 145 mmol/L 137   Potassium      3.5 - 5.1 mmol/L 4.3   Chloride      97 - 108 mmol/L 104   CO2      21 - 32 mmol/L 27   Anion gap      5 - 15 mmol/L 6   Glucose      65 - 100 mg/dL 52 (LL)   BUN      6 - 20 MG/DL 28 (H)   Creatinine      0.70 - 1.30 MG/DL 0.92   BUN/Creatinine ratio      12 - 20   30 (H)   GFR est AA      >60 ml/min/1.73m2 >60   GFR est non-AA      >60 ml/min/1.73m2 >60   Calcium      8.5 - 10.1 MG/DL 8.5     Assessment/Plan:     1. 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  He appears to have Type 1 diabetes not type 2 given his c-peptide was 0.8 in 11/16. However, given his weight, I agree with metformin to help with insulin resistance. He is having more readings under 90 with losing weight so slightly decreased his basal rate. - cont metformin  mg 2 tabs bid  - cont current pump settings aside from change below  - check bs 3 times per day  - foot exam done 4/19  - eye exam 3/19  - microalbumin nl 10/20  - check Hgb A1c, cmp, and microalbumin prior to next visit        2. Essential hypertension: his BP was at goal < 140/90 in the hospital in 4/21  - cut back on salt and caffeine  -  cont current regimen for now        3. Pure hypercholesterolemia: LDL 39 in 10/17 on atorvastatin 10 mg daily and 31 in 11/18 and in 10/19 so decreased to 1/2 tab daily and still 23 in 10/20 so decreased to 1/2 tab 3x/week  - cont lipitor 10 mg 1/2 tab 3x/week  - check lipids prior to next visit          Patient Instructions   1) Current pump settings are as follows:  - basal: 12a: 5.9 units/hr  - Carb ratio: 3  - sensitivity: 10  - target: 100-120  - active insulin time: 3 hr    Decrease your basal rate from 6 to 5.9 units/hr. If you have 2 or more readings under 90 in a week, then decrease your basal by 0.1 units/hr. 2) Please come for a follow up visit on 12/3/21 at 8:50am in our [place] office. 3) I put an order directly into the [employer] system to repeat your labs in the 1-2 weeks prior to your next visit so just ask for the order under my name and you will receive a courtesy reminder through [employer] to have these drawn.                   Follow-up and Dispositions    · Return 12/3/21 at 8:50am.               Copy sent to:  Lisa Figueroa MD

## 2021-05-21 NOTE — PATIENT INSTRUCTIONS
1) Current pump settings are as follows: 
- basal: 12a: 5.9 units/hr - Carb ratio: 3 
- sensitivity: 10 
- target: 100-120 
- active insulin time: 3 hr 
 
Decrease your basal rate from 6 to 5.9 units/hr. If you have 2 or more readings under 90 in a week, then decrease your basal by 0.1 units/hr. 2) Please come for a follow up visit on 12/3/21 at 8:50am in our [place] office. 3) I put an order directly into the [employer] system to repeat your labs in the 1-2 weeks prior to your next visit so just ask for the order under my name and you will receive a courtesy reminder through [employer] to have these drawn.

## 2021-06-07 ENCOUNTER — OFFICE VISIT (OUTPATIENT)
Dept: INTERNAL MEDICINE CLINIC | Age: 46
End: 2021-06-07
Payer: COMMERCIAL

## 2021-06-07 VITALS
RESPIRATION RATE: 16 BRPM | WEIGHT: 315 LBS | DIASTOLIC BLOOD PRESSURE: 90 MMHG | SYSTOLIC BLOOD PRESSURE: 144 MMHG | TEMPERATURE: 97 F | BODY MASS INDEX: 45.1 KG/M2 | OXYGEN SATURATION: 99 % | HEART RATE: 98 BPM | HEIGHT: 70 IN

## 2021-06-07 DIAGNOSIS — R74.8 ABNORMAL LIVER ENZYMES: Primary | ICD-10-CM

## 2021-06-07 DIAGNOSIS — U07.1 PNEUMONIA DUE TO COVID-19 VIRUS: ICD-10-CM

## 2021-06-07 DIAGNOSIS — J12.82 PNEUMONIA DUE TO COVID-19 VIRUS: ICD-10-CM

## 2021-06-07 PROCEDURE — 99213 OFFICE O/P EST LOW 20 MIN: CPT | Performed by: INTERNAL MEDICINE

## 2021-06-07 NOTE — PROGRESS NOTES
1. Have you been to the ER, urgent care clinic since your last visit? Hospitalized since your last visit? No    2. Have you seen or consulted any other health care providers outside of the [address] [place] [address] since your last visit? Include any pap smears or colon screening.  No   Chief Complaint   Patient presents with    Hypertension     follow up    Diabetes     follow up

## 2021-11-15 RX ORDER — INSULIN LISPRO 100 [IU]/ML
INJECTION, SOLUTION INTRAVENOUS; SUBCUTANEOUS
Qty: 230 ML | Refills: 3 | Status: SHIPPED | OUTPATIENT
Start: 2021-11-15 | End: 2021-11-21 | Stop reason: SDUPTHER

## 2021-11-19 DIAGNOSIS — E08.3299 DIABETES MELLITUS DUE TO UNDERLYING CONDITION WITH MILD NONPROLIFERATIVE RETINOPATHY WITHOUT MACULAR EDEMA, WITH LONG-TERM CURRENT USE OF INSULIN, UNSPECIFIED LATERALITY (HCC): ICD-10-CM

## 2021-11-19 DIAGNOSIS — I10 ESSENTIAL HYPERTENSION: ICD-10-CM

## 2021-11-19 DIAGNOSIS — E78.00 PURE HYPERCHOLESTEROLEMIA: ICD-10-CM

## 2021-11-19 DIAGNOSIS — Z79.4 DIABETES MELLITUS DUE TO UNDERLYING CONDITION WITH MILD NONPROLIFERATIVE RETINOPATHY WITHOUT MACULAR EDEMA, WITH LONG-TERM CURRENT USE OF INSULIN, UNSPECIFIED LATERALITY (HCC): ICD-10-CM

## 2021-11-22 RX ORDER — INSULIN LISPRO 100 [IU]/ML
INJECTION, SOLUTION INTRAVENOUS; SUBCUTANEOUS
Qty: 230 ML | Refills: 3
Start: 2021-11-22 | End: 2021-11-29 | Stop reason: SDUPTHER

## 2021-11-30 RX ORDER — INSULIN LISPRO 100 [IU]/ML
INJECTION, SOLUTION INTRAVENOUS; SUBCUTANEOUS
Qty: 230 ML | Refills: 3
Start: 2021-11-30 | End: 2021-12-03 | Stop reason: SDUPTHER

## 2021-12-03 ENCOUNTER — OFFICE VISIT (OUTPATIENT)
Dept: ENDOCRINOLOGY | Age: 46
End: 2021-12-03
Payer: COMMERCIAL

## 2021-12-03 VITALS
HEIGHT: 69 IN | SYSTOLIC BLOOD PRESSURE: 138 MMHG | DIASTOLIC BLOOD PRESSURE: 80 MMHG | BODY MASS INDEX: 46.65 KG/M2 | HEART RATE: 107 BPM | WEIGHT: 315 LBS

## 2021-12-03 DIAGNOSIS — E08.3299 DIABETES MELLITUS DUE TO UNDERLYING CONDITION WITH MILD NONPROLIFERATIVE RETINOPATHY WITHOUT MACULAR EDEMA, WITH LONG-TERM CURRENT USE OF INSULIN, UNSPECIFIED LATERALITY (HCC): Primary | ICD-10-CM

## 2021-12-03 DIAGNOSIS — E78.00 PURE HYPERCHOLESTEROLEMIA: ICD-10-CM

## 2021-12-03 DIAGNOSIS — Z79.4 DIABETES MELLITUS DUE TO UNDERLYING CONDITION WITH MILD NONPROLIFERATIVE RETINOPATHY WITHOUT MACULAR EDEMA, WITH LONG-TERM CURRENT USE OF INSULIN, UNSPECIFIED LATERALITY (HCC): Primary | ICD-10-CM

## 2021-12-03 DIAGNOSIS — I10 ESSENTIAL HYPERTENSION: ICD-10-CM

## 2021-12-03 LAB — HBA1C MFR BLD HPLC: 8.7 %

## 2021-12-03 PROCEDURE — 95251 CONT GLUC MNTR ANALYSIS I&R: CPT | Performed by: INTERNAL MEDICINE

## 2021-12-03 PROCEDURE — 3052F HG A1C>EQUAL 8.0%<EQUAL 9.0%: CPT | Performed by: INTERNAL MEDICINE

## 2021-12-03 PROCEDURE — 83036 HEMOGLOBIN GLYCOSYLATED A1C: CPT | Performed by: INTERNAL MEDICINE

## 2021-12-03 PROCEDURE — 99214 OFFICE O/P EST MOD 30 MIN: CPT | Performed by: INTERNAL MEDICINE

## 2021-12-03 RX ORDER — INSULIN LISPRO 100 [IU]/ML
INJECTION, SOLUTION INTRAVENOUS; SUBCUTANEOUS
Qty: 80 ML | Refills: 11 | Status: SHIPPED | OUTPATIENT
Start: 2021-12-03 | End: 2022-03-03 | Stop reason: CLARIF

## 2021-12-03 NOTE — PROGRESS NOTES
Chief Complaint   Patient presents with    Diabetes     pcp and pharmacy confirmed    Other     consent obtain for eye report     History of Present Illness: Tyrell Cox Sr. is a 55 y.o. male here for follow up of diabetes. Current pump settings are as follows:  - basal: 12a: 6.35 units/hr  - Carb ratio: 3  - sensitivity: 10  - target: 100-120  - active insulin time: 3 hr    Weight up 24 lbs since last visit in 5/21. Just got a new pump last week as his old pump was having trouble with insulin delivery and thinks this is why his A1c is still high. Did have to increase his basal rate back from 5.9 to 6.35 units/hr. Not currently in automode but plans to set up this up. Review of his sensor tracings shows that he is able to stay between  for the most part on his current settings provided he boluses on time. Compliant with BP and lipid regimen. Current Outpatient Medications   Medication Sig    insulin lispro (HumaLOG U-100 Insulin) 100 unit/mL injection USE UP  UNITS DAILY AS DIRECTED VIA INSULIN PUMP    atorvastatin (LIPITOR) 10 mg tablet TAKE 1/2 TAB 3x/week--Dose change 5/21/21--updated med list--did not send prescription to the pharmacy    chlorthalidone (HYGROTON) 25 mg tablet TAKE 1 TABLET DAILY    losartan (COZAAR) 100 mg tablet TAKE 1 TABLET DAILY    insulin pump (PATIENT SUPPLIED) misc by SubCUTAneous route continuous.  metFORMIN ER (GLUCOPHAGE XR) 500 mg tablet TAKE 2 TABLETS WITH BREAKFAST AND DINNER    multivitamin (DAILY MULTI-VITAMIN) tablet Take 1 Tab by mouth daily.  aspirin 81 mg tablet Take 81 mg by mouth daily. No current facility-administered medications for this visit. Allergies   Allergen Reactions    Lisinopril Cough     Review of Systems: PER HPI    Physical Examination:  Blood pressure 138/80, pulse (!) 107, height 5' 8.5\" (1.74 m), weight (!) 372 lb 3.2 oz (168.8 kg).   - General: pleasant, no distress, good eye contact   - Neck: no carotid bruits  - Cardiovascular: regular, normal rate, nl s1 and s2, no m/r/g,   - Respiratory: clear bilaterally  - Integumentary: no edema,   - Psychiatric: normal mood and affect    Diabetic foot exam:     Left Foot:   Visual Exam: callous - moderate   Pulse DP: 2+ (normal)   Filament test: reduced sensation    Vibratory sensation: diminished      Right Foot:   Visual Exam: callous - moderate   Pulse DP: 2+ (normal)   Filament test: reduced sensation    Vibratory sensation: diminished        Data Reviewed:   Component      Latest Ref Rng & Units 12/3/2021           9:19 AM   Hemoglobin A1c (POC)      % 8.7       Assessment/Plan:     1. 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 He appears to have Type 1 diabetes not type 2 given his c-peptide was 0.8 in 11/16. However, given his weight, I agree with metformin to help with insulin resistance. His A1c was up due to trouble with insulin delivery on his old pump and just got a new one and readings look better this week so didn't make any changes. - cont metformin  mg 2 tabs bid  - cont current pump settings   - check bs 3 times per day  - foot exam done 12/21  - eye exam 2021--will obtain report  - microalbumin nl 10/20  - check Hgb cmp and microalbumin today        2. Essential hypertension: his BP was at goal < 140/90   -  cont current regimen for now        3.  Pure hypercholesterolemia: LDL 39 in 10/17 on atorvastatin 10 mg daily and 31 in 11/18 and in 10/19 so decreased to 1/2 tab daily and still 23 in 10/20 so decreased to 1/2 tab 3x/week  - cont lipitor 10 mg 1/2 tab 3x/week  - check lipids today          Patient Instructions   1) Your Hemoglobin A1c (3 month test of blood sugar) was 8.7% down from 9.2% but hopefully with getting on the new pump, your next one will be back down. 2) Your blood pressure is controlled. 3)  I will send you a message through [employer] with your lab results. Follow-up and Dispositions    · Return in about 6 months (around 6/3/2022). Copy sent to:  Lauri Bernard MD    Lab follow up: 12/04/21    Component      Latest Ref Rng & Units 12/3/2021 12/3/2021 12/3/2021           9:52 AM  9:52 AM  9:52 AM   Sodium      136 - 145 mmol/L   140   Potassium      3.5 - 5.1 mmol/L   3.4 (L)   Chloride      97 - 108 mmol/L   107   CO2      21 - 32 mmol/L   26   Anion gap      5 - 15 mmol/L   7   Glucose      65 - 100 mg/dL   62 (L)   BUN      6 - 20 MG/DL   14   Creatinine      0.70 - 1.30 MG/DL   1.01   BUN/Creatinine ratio      12 - 20     14   GFR est AA      >60 ml/min/1.73m2   >60   GFR est non-AA      >60 ml/min/1.73m2   >60   Calcium      8.5 - 10.1 MG/DL   9.9   Bilirubin, total      0.2 - 1.0 MG/DL   0.7   ALT      12 - 78 U/L   56   AST      15 - 37 U/L   32   Alk. phosphatase      45 - 117 U/L   52   Protein, total      6.4 - 8.2 g/dL   7.8   Albumin      3.5 - 5.0 g/dL   3.9   Globulin      2.0 - 4.0 g/dL   3.9   A-G Ratio      1.1 - 2.2     1.0 (L)   Cholesterol, total      <200 MG/DL  74    Triglyceride      <150 MG/DL  101    HDL Cholesterol      MG/DL  34    LDL, calculated      0 - 100 MG/DL  19.8    VLDL, calculated      MG/DL  20.2    CHOL/HDL Ratio      0.0 - 5.0    2.2    Microalbumin,urine random      MG/DL 0.63     Creatinine, urine      mg/dL 80.90     Microalbumin/Creat. Ratio      0 - 30 mg/g 8       Sent him the following message through [employer]:    Total Cholesterol is the total number of cholesterol particles in your blood. Goal is less than 200. Triglycerides are the short term fats in your blood. Goal is less than 150. HDL is the good cholesterol in your blood. Goal is more than 50 if you are a woman and 40 if you are a man. LDL is the bad cholesterol in your blood. Goal is less than 100 unless you have a history of heart disease or stroke and then goal is under 70. Continue to follow a low cholesterol diet. Try to limit the amount of fried foods, fatty foods, butter, gravy, red meat, ice cream, cheese, and eggs in your diet, which are all high in cholesterol.  -------------------------------------------------------------------------------------------------------------------  BUN and creatinine are markers of kidney function. Your values are normal.  -------------------------------------------------------------------------------------------------------------------  ALT and AST are markers of liver function. Your values are normal.  -------------------------------------------------------------------------------------------------------------------  Microalbumin/creatinine ratio is a marker of the amount of protein in your urine. Goal is less than 30. Your value is normal. This indicates that your kidneys are not being affected by your uncontrolled diabetes and/or blood pressure.  -------------------------------------------------------------------------------------------------------------------  We will just check your A1c at your next visit so you won't need any additional labs prior to the visit.

## 2021-12-03 NOTE — PATIENT INSTRUCTIONS
1) Your Hemoglobin A1c (3 month test of blood sugar) was 8.7% down from 9.2% but hopefully with getting on the new pump, your next one will be back down. 2) Your blood pressure is controlled. 3)  I will send you a message through [employer] with your lab results.

## 2022-02-28 RX ORDER — ATORVASTATIN CALCIUM 10 MG/1
TABLET, FILM COATED ORAL
Qty: 90 TABLET | Refills: 3 | Status: SHIPPED | OUTPATIENT
Start: 2022-02-28 | End: 2022-06-01 | Stop reason: SDUPTHER

## 2022-03-03 ENCOUNTER — TELEPHONE (OUTPATIENT)
Dept: ENDOCRINOLOGY | Age: 47
End: 2022-03-03

## 2022-03-03 RX ORDER — INSULIN ASPART 100 [IU]/ML
INJECTION, SOLUTION INTRAVENOUS; SUBCUTANEOUS
Qty: 80 ML | Refills: 3 | Status: SHIPPED | OUTPATIENT
Start: 2022-03-03 | End: 2022-07-12 | Stop reason: SDUPTHER

## 2022-03-04 NOTE — TELEPHONE ENCOUNTER
Please let him know I received a fax from [facility] that his humalog is no longer covered but novolog is so I sent a supply of novolog to [facility].  If he would prefer this go to mail order, let me know and I can send a supply there for the future.

## 2022-03-18 PROBLEM — Z99.89 OSA ON CPAP: Status: ACTIVE | Noted: 2019-11-25

## 2022-03-18 PROBLEM — J12.82 PNEUMONIA DUE TO COVID-19 VIRUS: Status: ACTIVE | Noted: 2021-04-12

## 2022-03-18 PROBLEM — J96.01 ACUTE RESPIRATORY FAILURE WITH HYPOXEMIA (HCC): Status: ACTIVE | Noted: 2021-04-12

## 2022-03-18 PROBLEM — G47.33 OSA ON CPAP: Status: ACTIVE | Noted: 2019-11-25

## 2022-03-18 PROBLEM — U07.1 PNEUMONIA DUE TO COVID-19 VIRUS: Status: ACTIVE | Noted: 2021-04-12

## 2022-03-19 PROBLEM — E66.01 OBESITY, MORBID (HCC): Status: ACTIVE | Noted: 2018-01-18

## 2022-03-20 PROBLEM — E87.1 HYPONATREMIA: Status: ACTIVE | Noted: 2021-04-12

## 2022-03-28 RX ORDER — LOSARTAN POTASSIUM 100 MG/1
TABLET ORAL
Qty: 90 TABLET | Refills: 3 | Status: SHIPPED | OUTPATIENT
Start: 2022-03-28 | End: 2022-06-01 | Stop reason: SDUPTHER

## 2022-03-28 RX ORDER — CHLORTHALIDONE 25 MG/1
TABLET ORAL
Qty: 90 TABLET | Refills: 3 | Status: SHIPPED | OUTPATIENT
Start: 2022-03-28 | End: 2022-06-01 | Stop reason: SDUPTHER

## 2022-04-25 ENCOUNTER — TELEPHONE (OUTPATIENT)
Dept: ENDOCRINOLOGY | Age: 47
End: 2022-04-25

## 2022-04-25 RX ORDER — BLOOD-GLUCOSE TRANSMITTER
EACH MISCELLANEOUS
Qty: 1 EACH | Refills: 3 | Status: SHIPPED | OUTPATIENT
Start: 2022-04-25 | End: 2022-05-17 | Stop reason: SDUPTHER

## 2022-04-25 RX ORDER — BLOOD-GLUCOSE,RECEIVER,CONT
EACH MISCELLANEOUS
Qty: 1 EACH | Refills: 0 | Status: SHIPPED | OUTPATIENT
Start: 2022-04-25

## 2022-04-25 RX ORDER — BLOOD-GLUCOSE SENSOR
EACH MISCELLANEOUS
Qty: 3 EACH | Refills: 11 | Status: SHIPPED | OUTPATIENT
Start: 2022-04-25 | End: 2022-05-17 | Stop reason: SDUPTHER

## 2022-04-29 ENCOUNTER — TELEPHONE (OUTPATIENT)
Dept: ENDOCRINOLOGY | Age: 47
End: 2022-04-29

## 2022-04-29 NOTE — TELEPHONE ENCOUNTER
4/29/2022  3:44 PM    Nhi from [employer]  with [employer] called and stated she is calling to see if their fax has been received. They can be reached at [phone] and fax# [phone].     Thanks,   Suri Chen

## 2022-05-16 RX ORDER — METFORMIN HYDROCHLORIDE 500 MG/1
TABLET, EXTENDED RELEASE ORAL
Qty: 360 TABLET | Refills: 3 | Status: SHIPPED | OUTPATIENT
Start: 2022-05-16

## 2022-05-18 RX ORDER — BLOOD-GLUCOSE SENSOR
EACH MISCELLANEOUS
Qty: 3 EACH | Refills: 11 | Status: SHIPPED | OUTPATIENT
Start: 2022-05-18

## 2022-05-18 RX ORDER — BLOOD-GLUCOSE TRANSMITTER
EACH MISCELLANEOUS
Qty: 1 EACH | Refills: 3 | Status: SHIPPED | OUTPATIENT
Start: 2022-05-18

## 2022-06-01 ENCOUNTER — OFFICE VISIT (OUTPATIENT)
Dept: INTERNAL MEDICINE CLINIC | Age: 47
End: 2022-06-01
Payer: COMMERCIAL

## 2022-06-01 VITALS
BODY MASS INDEX: 46.65 KG/M2 | HEART RATE: 98 BPM | OXYGEN SATURATION: 98 % | RESPIRATION RATE: 16 BRPM | HEIGHT: 69 IN | SYSTOLIC BLOOD PRESSURE: 153 MMHG | DIASTOLIC BLOOD PRESSURE: 83 MMHG | TEMPERATURE: 98.2 F | WEIGHT: 315 LBS

## 2022-06-01 DIAGNOSIS — D21.9 FIBROMA: ICD-10-CM

## 2022-06-01 DIAGNOSIS — Z12.5 PROSTATE CANCER SCREENING: ICD-10-CM

## 2022-06-01 DIAGNOSIS — E66.01 MORBID OBESITY WITH BMI OF 50.0-59.9, ADULT (HCC): ICD-10-CM

## 2022-06-01 DIAGNOSIS — E08.3299 DIABETES MELLITUS DUE TO UNDERLYING CONDITION WITH MILD NONPROLIFERATIVE RETINOPATHY WITHOUT MACULAR EDEMA, WITH LONG-TERM CURRENT USE OF INSULIN, UNSPECIFIED LATERALITY (HCC): ICD-10-CM

## 2022-06-01 DIAGNOSIS — Z79.4 DIABETES MELLITUS DUE TO UNDERLYING CONDITION WITH MILD NONPROLIFERATIVE RETINOPATHY WITHOUT MACULAR EDEMA, WITH LONG-TERM CURRENT USE OF INSULIN, UNSPECIFIED LATERALITY (HCC): ICD-10-CM

## 2022-06-01 DIAGNOSIS — Z12.11 COLON CANCER SCREENING: Primary | ICD-10-CM

## 2022-06-01 PROCEDURE — 99213 OFFICE O/P EST LOW 20 MIN: CPT | Performed by: INTERNAL MEDICINE

## 2022-06-01 RX ORDER — CHLORTHALIDONE 25 MG/1
TABLET ORAL
Qty: 90 TABLET | Refills: 3 | Status: SHIPPED | OUTPATIENT
Start: 2022-06-01

## 2022-06-01 RX ORDER — ATORVASTATIN CALCIUM 10 MG/1
TABLET, FILM COATED ORAL
Qty: 90 TABLET | Refills: 3 | Status: SHIPPED | OUTPATIENT
Start: 2022-06-01

## 2022-06-01 RX ORDER — LOSARTAN POTASSIUM 100 MG/1
TABLET ORAL
Qty: 90 TABLET | Refills: 3 | Status: SHIPPED | OUTPATIENT
Start: 2022-06-01

## 2022-06-01 NOTE — PROGRESS NOTES
1. Have you been to the ER, urgent care clinic since your last visit? Hospitalized since your last visit? No    2. Have you seen or consulted any other health care providers outside of the [address] [place] [address] since your last visit? Include any pap smears or colon screening.  No   Chief Complaint   Patient presents with    Medication Refill

## 2022-06-01 NOTE — PROGRESS NOTES
Hypertension ROS: taking medications as instructed, no medication side effects noted, no TIA's, no chest pain on exertion, no dyspnea on exertion, no swelling of ankles, ran out BP meds 12 days ago. New concerns: weight  Skin tag or fibroma. Hypertension Exam: BP noted to be mildly elevated today in office, S1, S2 normal, no gallop, no murmur, chest clear, no JVD, no HSM, no edema. Lab review: labs reviewed, I note that glycosylated hemoglobin abnormal   Lab Results   Component Value Date/Time    Hemoglobin A1c 9.4 (H) 04/13/2021 04:05 AM    Hemoglobin A1c (POC) 8.7 12/03/2021 09:19 AM     .   Assessment:  Hypertension poorly controlled, ran out of meds. Plan: current treatment plan is effective, no change in therapy  lab results and schedule of future lab studies reviewed with patient  repeat labs ordered prior to next appointment. Cardiovascular risk analysis - 55 y.o. male LDL goal is under 100. ROS: taking medications as instructed, no medication side effects noted, no TIA's, no chest pain on exertion, no dyspnea on exertion, no swelling of ankles. New concerns: fibroma large on back of neck  Lab Results   Component Value Date/Time    Cholesterol, total 74 12/03/2021 09:52 AM    HDL Cholesterol 34 12/03/2021 09:52 AM    LDL, calculated 19.8 12/03/2021 09:52 AM    VLDL, calculated 20.2 12/03/2021 09:52 AM    Triglyceride 101 12/03/2021 09:52 AM    CHOL/HDL Ratio 2.2 12/03/2021 09:52 AM     .   Exam: BP noted to be mildly elevated today in office, S1, S2 normal, no gallop, no murmur, chest clear, no JVD, no HSM, no edema. Lab review:  Assessment:  Hyperlipidemia stable. Plan: current treatment plan is effective, no change in therapy. Needs colonoscopy and psa testing        1. Colon cancer screening  Due age 39  - [de-identified] TO GASTROENTEROLOGY    2. Morbid obesity with BMI of 50.0-59.9, adult (HCC)  Unchanged not serious    3.  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 Prostate cancer screening  due  - PSA SCREENING (SCREENING); Future    5.  Fibroma  See derm or surgery  - REFERRAL TO GENERAL SURGERY  Requested Prescriptions     Signed Prescriptions Disp Refills    losartan (COZAAR) 100 mg tablet 90 Tablet 3     Sig: TAKE 1 TABLET DAILY  Indications: hypertension with left ventricular hypertrophy    chlorthalidone (HYGROTON) 25 mg tablet 90 Tablet 3     Sig: TAKE 1 TABLET DAILY    atorvastatin (LIPITOR) 10 mg tablet 90 Tablet 3     Sig: TAKE 1 TABLET DAILY

## 2022-06-03 ENCOUNTER — OFFICE VISIT (OUTPATIENT)
Dept: ENDOCRINOLOGY | Age: 47
End: 2022-06-03
Payer: COMMERCIAL

## 2022-06-03 VITALS
WEIGHT: 315 LBS | SYSTOLIC BLOOD PRESSURE: 137 MMHG | DIASTOLIC BLOOD PRESSURE: 67 MMHG | HEIGHT: 70 IN | BODY MASS INDEX: 45.1 KG/M2 | HEART RATE: 102 BPM

## 2022-06-03 DIAGNOSIS — E78.00 PURE HYPERCHOLESTEROLEMIA: ICD-10-CM

## 2022-06-03 DIAGNOSIS — Z79.4 DIABETES MELLITUS DUE TO UNDERLYING CONDITION WITH MILD NONPROLIFERATIVE RETINOPATHY WITHOUT MACULAR EDEMA, WITH LONG-TERM CURRENT USE OF INSULIN, UNSPECIFIED LATERALITY (HCC): Primary | ICD-10-CM

## 2022-06-03 DIAGNOSIS — E08.3299 DIABETES MELLITUS DUE TO UNDERLYING CONDITION WITH MILD NONPROLIFERATIVE RETINOPATHY WITHOUT MACULAR EDEMA, WITH LONG-TERM CURRENT USE OF INSULIN, UNSPECIFIED LATERALITY (HCC): Primary | ICD-10-CM

## 2022-06-03 DIAGNOSIS — Z12.5 PROSTATE CANCER SCREENING: ICD-10-CM

## 2022-06-03 DIAGNOSIS — I10 ESSENTIAL HYPERTENSION: ICD-10-CM

## 2022-06-03 LAB — HBA1C MFR BLD HPLC: 8.2 %

## 2022-06-03 PROCEDURE — 99214 OFFICE O/P EST MOD 30 MIN: CPT | Performed by: INTERNAL MEDICINE

## 2022-06-03 PROCEDURE — 3052F HG A1C>EQUAL 8.0%<EQUAL 9.0%: CPT | Performed by: INTERNAL MEDICINE

## 2022-06-03 PROCEDURE — 83036 HEMOGLOBIN GLYCOSYLATED A1C: CPT | Performed by: INTERNAL MEDICINE

## 2022-06-03 NOTE — PROGRESS NOTES
Chief Complaint   Patient presents with    Diabetes     pcp and pharmacy confirmed    Other     eye exam is due    Other     A1c done     History of Present Illness: Kalyani Sandoval Sr. is a 55 y.o. male here for follow up of diabetes. Current pump settings are as follows:  - basal: 12a: 6.35 units/hr  - Carb ratio: 3  - sensitivity: 10  - target: 100-120  - active insulin time: 3 hr    Has not made any further changes to his pump settings. Just went on Dexcom about 10 days ago as his sensor just  today and I sent him an invite so he can share his data with me in the future. His blood sugars are coming down in the 100-180 range more consistently and hopes the Dexcom will help him fine tune his eating. Compliant with BP and lipid regimen.     he has the following indications to continue treatment with Dexcom Freestyle Esvin:  1) he has type 1 diabetes (E10.65) and is on an intensive insulin regimen with an insulin pump   2) he tests his blood sugar 3 times per day and makes treatment decisions off his blood sugar readings and dexcom sensor readings  3) he requires frequent adjustments to his insulin pump settings injection doses based on his dexcom sensor readings  4) he has benefited from therapeutic continuous glucose monitoring and I recommend that he continue with this  5) he is seen in my office every 6 months      Current Outpatient Medications   Medication Sig    losartan (COZAAR) 100 mg tablet TAKE 1 TABLET DAILY  Indications: hypertension with left ventricular hypertrophy    chlorthalidone (HYGROTON) 25 mg tablet TAKE 1 TABLET DAILY    atorvastatin (LIPITOR) 10 mg tablet TAKE 1 TABLET DAILY    Dexcom G6 Transmitter jessie Use as directed every 90 days    Dexcom G6 Sensor jessie Use as directed every 10 days    metFORMIN ER (GLUCOPHAGE XR) 500 mg tablet TAKE 2 TABLETS WITH BREAKFAST AND DINNER    Dexcom G6  misc Use as directed    NovoLOG U-100 Insulin aspart 100 unit/mL injection USE UP  UNITS DAILY AS DIRECTED VIA INSULIN PUMP--30 DAY SUPPLY --replaces humalog    insulin pump (PATIENT SUPPLIED) [facility] by SubCUTAneous route continuous.  multivitamin (DAILY MULTI-VITAMIN) tablet Take 1 Tab by mouth daily.  aspirin 81 mg tablet Take 81 mg by mouth daily. No current facility-administered medications for this visit. Allergies   Allergen Reactions    Lisinopril Cough     Review of Systems: PER HPI    Physical Examination:  Blood pressure 137/67, pulse (!) 102, height 5' 10\" (1.778 m), weight (!) 379 lb 12.8 oz (172.3 kg). - General: pleasant, no distress, good eye contact   - Neck: no carotid bruits  - Cardiovascular: regular, normal rate, nl s1 and s2, no m/r/g,   - Respiratory: clear bilaterally  - Integumentary: no edema,   - Psychiatric: normal mood and affect    Data Reviewed:   Component      Latest Ref Rng & Units 6/3/2022           2:24 PM   Hemoglobin A1c (POC)      % 8.2       Assessment/Plan:     1. 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 He appears to have Type 1 diabetes not type 2 given his c-peptide was 0.8 in 11/16. However, given his weight, I agree with metformin to help with insulin resistance. His A1c was better and just got on Dexcom 10 days ago which should improve his control so didn't make any changes. - cont metformin  mg 2 tabs bid  - cont current pump settings   - check bs 3 times per day  - foot exam done 12/21  - eye exam 2021--will obtain report  - microalbumin nl 12/21  - check Hgb A1c, cmp and microalbumin prior to next visit        2. Essential hypertension: his BP was at goal < 140/90   -  cont current regimen for now        3. Pure hypercholesterolemia: LDL 39 in 10/17 on atorvastatin 10 mg daily and 31 in 11/18 and in 10/19 so decreased to 1/2 tab daily and still 23 in 10/20 so decreased to 1/2 tab 3x/week and LDL 19 in 12/21  - cont lipitor 10 mg 1/2 tab 3x/week  - check lipids prior to next visit          Patient Instructions   1) Please accept the invite I sent to your e-mail so you can share your dexcom data with me and I can see if there are any trends that warrant changes to your insulin regimen. Once you have done this, please let me know over [employer]. Thanks. Over the next 6 months if you want me to look at your data, just let me know and I can log onto the site. 2) Your Hemoglobin A1c (3 month test of blood sugar) was 8.2% down from 8.7% in 12/21 and 9.4% in 4/21. Follow-up and Dispositions    · Return in about 6 months (around 12/3/2022).                Copy sent to:  Allen Richards MD

## 2022-06-03 NOTE — PATIENT INSTRUCTIONS
1) Please accept the invite I sent to your e-mail so you can share your dexcom data with me and I can see if there are any trends that warrant changes to your insulin regimen. Once you have done this, please let me know over [employer]. Thanks. Over the next 6 months if you want me to look at your data, just let me know and I can log onto the site. 2) Your Hemoglobin A1c (3 month test of blood sugar) was 8.2% down from 8.7% in 12/21 and 9.4% in 4/21.

## 2022-06-05 DIAGNOSIS — Z12.5 PROSTATE CANCER SCREENING: ICD-10-CM

## 2022-07-12 RX ORDER — INSULIN ASPART 100 [IU]/ML
INJECTION, SOLUTION INTRAVENOUS; SUBCUTANEOUS
Qty: 40 ML | Refills: 11 | Status: SHIPPED | OUTPATIENT
Start: 2022-07-12 | End: 2022-09-02 | Stop reason: SDUPTHER

## 2022-09-01 ENCOUNTER — OFFICE VISIT (OUTPATIENT)
Dept: SURGERY | Age: 47
End: 2022-09-01
Payer: COMMERCIAL

## 2022-09-01 VITALS
OXYGEN SATURATION: 97 % | SYSTOLIC BLOOD PRESSURE: 138 MMHG | RESPIRATION RATE: 20 BRPM | TEMPERATURE: 98.5 F | DIASTOLIC BLOOD PRESSURE: 76 MMHG | BODY MASS INDEX: 45.1 KG/M2 | HEART RATE: 92 BPM | WEIGHT: 315 LBS | HEIGHT: 70 IN

## 2022-09-01 DIAGNOSIS — L98.9 SKIN LESION OF NECK: Primary | ICD-10-CM

## 2022-09-01 PROCEDURE — 99202 OFFICE O/P NEW SF 15 MIN: CPT | Performed by: SURGERY

## 2022-09-01 NOTE — PROGRESS NOTES
[facility] History and Physical    Chief Complaint: skin lesion posterior neck    History of Present Illness:      Koko Parker Sr. is a 55 y.o. male who who has had a mass or skin lesion on his posterior neck for many years. This seems to have gotten bigger recently. It is also becoming increasingly painful. He has not had similar lesions excised anywhere else. He denies any drainage. He states that at times the area feels more hard, and is softer at others. Past Medical History:   Diagnosis Date    Arthritis     DM (diabetes mellitus) (HCC) Age 32    HLD (hyperlipidaemia)     HTN     Sleep apnea        Past Surgical History:   Procedure Laterality Date    HX ORTHOPAEDIC Left 1998    pins in toes of foot    HX REFRACTIVE SURGERY         Social History     Socioeconomic History    Marital status:      Spouse name: Not on file    Number of children: Not on file    Years of education: Not on file    Highest education level: Not on file   Occupational History    Not on file   Tobacco Use    Smoking status: Never    Smokeless tobacco: Never   Vaping Use    Vaping Use: Never used   Substance and Sexual Activity    Alcohol use: Yes     Alcohol/week: 0.0 standard drinks     Comment: glass of wine 1-2 times a month    Drug use: No    Sexual activity: Yes     Partners: Female   Other Topics Concern    Not on file   Social History Narrative    Lives in [address] [place] [address] with wife and 26 yo son, 15 yo son, and 9 yo daughter. Works as a  for [employer]. Likes to fish.        Social Determinants of Health     Financial Resource Strain: Not on file   Food Insecurity: Not on file   Transportation Needs: Not on file   Physical Activity: Not on file   Stress: Not on file   Social Connections: Not on file   Intimate Partner Violence: Not on file   Housing Stability: Not on file       Family History   Problem Relation Age of Onset    Diabetes Mother     Heart Attack Father 61    Diabetes Maternal Grandmother     Diabetes Maternal Grandfather     Diabetes Paternal Grandmother     Diabetes Paternal Grandfather          Current Outpatient Medications:     insulin aspart U-100 (NovoLOG U-100 Insulin aspart) 100 unit/mL injection, USE UP  UNITS DAILY AS DIRECTED VIA INSULIN PUMP--allow him to fill 4 vials at a time of generic aspart--will be paying cash, Disp: 40 mL, Rfl: 11    losartan (COZAAR) 100 mg tablet, TAKE 1 TABLET DAILY  Indications: hypertension with left ventricular hypertrophy, Disp: 90 Tablet, Rfl: 3    chlorthalidone (HYGROTON) 25 mg tablet, TAKE 1 TABLET DAILY, Disp: 90 Tablet, Rfl: 3    atorvastatin (LIPITOR) 10 mg tablet, TAKE 1 TABLET DAILY, Disp: 90 Tablet, Rfl: 3    Dexcom G6 Transmitter jessie, Use as directed every 90 days, Disp: 1 Each, Rfl: 3    Dexcom G6 Sensor jessie, Use as directed every 10 days, Disp: 3 Each, Rfl: 11    metFORMIN ER (GLUCOPHAGE XR) 500 mg tablet, TAKE 2 TABLETS WITH BREAKFAST AND DINNER, Disp: 360 Tablet, Rfl: 3    Dexcom G6  misc, Use as directed, Disp: 1 Each, Rfl: 0    insulin pump (PATIENT SUPPLIED) misc, by SubCUTAneous route continuous. , Disp: , Rfl:     multivitamin (ONE A DAY) tablet, Take 1 Tab by mouth daily. , Disp: , Rfl:     aspirin 81 mg tablet, Take 81 mg by mouth daily. , Disp: , Rfl:     Allergies   Allergen Reactions    Lisinopril Cough       ROS   Constitutional: negative  Ears, Nose, Mouth, Throat, and Face: Negative  Respiratory: Negative  Cardiovascular: Negative  Gastrointestinal: negative  Genitourinary: Negative  Integument/Breast: as above  Hematologic/Lymphatic: Negative  Behavioral/Psychiatric: Negative  Allergic/Immunologic: Negative      Physical Exam:     Visit Vitals  /76 (BP 1 Location: Right upper arm, BP Patient Position: Sitting, BP Cuff Size: Adult)   Pulse 92   Temp 98.5 °F (36.9 °C) (Oral)   Resp 20   Ht 5' 10\" (1.778 m)   Wt (!) 366 lb 6.4 oz (166.2 kg)   SpO2 97%   BMI 52.57 kg/m²       General - alert and oriented, no apparent distress  HEENT - NC/AT. No scleral icterus  Pulm - CTAB, normal inspiratory effort  CV - RRR, no M/R/G  Abd - soft, NT, ND. Ext - warm, well perfused, no edema  Skin - supple, no rashes. Exophytic skin lesion/subcutaneous mass with pedunculated base on posterior neck  Psychiatric - normal affect, good mood        Assessment:     Yunior Kyle Sr. is a 55 y.o. male with a posterior neck skin lesion, which is most consistent with a large skin tag or possible exophytic lipoma. This is becoming increasingly symptomatic. Recommendations:     1. I did recommend excision of the lesion. This can be done under local anesthetic. We discussed risk including bleeding, infection and recurrence. He understood and is willing to proceed. We will schedule the procedure in the near future. 20 mins of time was spent with the patient of which > 50% of the time involved face-to-face counseling of the patient regarding the proposed treatment plan.       Davide Swartz MD  9/1/2022    CC: Gaviota Hernández MD

## 2022-09-02 RX ORDER — INSULIN ASPART 100 [IU]/ML
INJECTION, SOLUTION INTRAVENOUS; SUBCUTANEOUS
Qty: 230 ML | Refills: 3 | Status: SHIPPED | OUTPATIENT
Start: 2022-09-02

## 2022-09-22 ENCOUNTER — HOSPITAL ENCOUNTER (OUTPATIENT)
Dept: LAB | Age: 47
Discharge: HOME | End: 2022-09-22

## 2022-09-22 ENCOUNTER — OFFICE VISIT (OUTPATIENT)
Dept: SURGERY | Age: 47
End: 2022-09-22
Payer: COMMERCIAL

## 2022-09-22 VITALS
TEMPERATURE: 98.2 F | SYSTOLIC BLOOD PRESSURE: 122 MMHG | WEIGHT: 315 LBS | RESPIRATION RATE: 18 BRPM | HEART RATE: 98 BPM | DIASTOLIC BLOOD PRESSURE: 77 MMHG | HEIGHT: 70 IN | BODY MASS INDEX: 45.1 KG/M2 | OXYGEN SATURATION: 97 %

## 2022-09-22 DIAGNOSIS — L98.9 SKIN LESION OF NECK: Primary | ICD-10-CM

## 2022-09-22 PROCEDURE — 11200 RMVL SKIN TAGS UP TO&INC 15: CPT | Performed by: SURGERY

## 2022-09-22 NOTE — PROGRESS NOTES
1. Have you been to the ER, urgent care clinic since your last visit? Hospitalized since your last visit? No    2. Have you seen or consulted any other health care providers outside of the [address] [place] [address] since your last visit? Include any pap smears or colon screening.  No

## 2022-09-22 NOTE — PROGRESS NOTES
Date of Procedure: 9/22/22  Preoperative Diagnosis: posterior neck skin lesion  Postoperative Diagnosis: same  Procedure: excision of posterior neck skin lesion    Surgeon: Kassie Santana MD     Anesthesia: local block with 1% lidocaine with epinephrine     Estimated Blood Loss: minimal    Specimens:   Posterior neck skin lesion     Findings: 3.5 x 2 x 1 cm skin lesion, consistent with large exophytic skin tag    Indications: large posterior neck skin lesion, consistent with skin tag, although other lesion can not be ruled out. I recommended excision. We discussed risks including bleeding, infection, and recurrence, and the patient was willing to proceed. Description of Procedure:  A timeout procedure was performed and informed consent was obtained. The area was prepped in the usual fashion. The skin around the lesion was anesthetized with 1% lidocaine with epinephrine. An elliptical incision was made on the skin. The lesion was dissected free of the surrounding tissues and passed off for pathology. Hemostasis was assured. The deep dermis was closed with 3-0 Vicryl suture. The skin was closed with subcuticular 3-0  Nylon. The wound was dressed. Wound care instructions were given to the patient. The patient will follow up in 1 to 2 weeks for a wound check.       Kassie Santana MD  9/22/2022

## 2022-10-06 ENCOUNTER — OFFICE VISIT (OUTPATIENT)
Dept: SURGERY | Age: 47
End: 2022-10-06
Payer: COMMERCIAL

## 2022-10-06 VITALS
TEMPERATURE: 98.3 F | BODY MASS INDEX: 45.1 KG/M2 | DIASTOLIC BLOOD PRESSURE: 76 MMHG | SYSTOLIC BLOOD PRESSURE: 123 MMHG | HEART RATE: 99 BPM | WEIGHT: 315 LBS | OXYGEN SATURATION: 97 % | HEIGHT: 70 IN

## 2022-10-06 DIAGNOSIS — Z09 POSTOPERATIVE EXAMINATION: Primary | ICD-10-CM

## 2022-10-06 PROCEDURE — 99024 POSTOP FOLLOW-UP VISIT: CPT | Performed by: SURGERY

## 2022-10-06 NOTE — PROGRESS NOTES
[facility]      Clinic Note - Follow up    [address] [facility]. returns for scheduled follow up today. Post skin lesion excision on the posterior neck. He is doing well. He had some minor drainage initially, but this is resolved. Objective     Visit Vitals  /76 (BP 1 Location: Left upper arm, BP Patient Position: Sitting, BP Cuff Size: Large adult)   Pulse 99   Temp 98.3 °F (36.8 °C) (Oral)   Ht 5' 10\" (1.778 m)   Wt (!) 370 lb (167.8 kg)   SpO2 97%   BMI 53.09 kg/m²         PE  GEN - Awake, alert, communicating appropriately. NAD  Pulm - CTAB  CV - RRR  Neck - posterior neck incision intact without erythema  All other systems negative unless indicated above. Assessment     Aleksandar Busch 136. is a 55 y. o.yr old male who is post excision of a posterior neck lesion. This was consistent with a skin tag on pathology. There is no evidence of any complications of the procedure. Plan     The sutures were removed. Further wound care instructions were given. He may follow-up with me on an as-needed basis. 15 mins of time was spent with the patient of which > 50% of the time involved face-to-face counseling of the patient regarding the proposed treatment plan.       Carlos Rucker MD  10/6/2022    CC: Chrissie Acharya MD

## 2022-10-06 NOTE — PROGRESS NOTES
Identified pt with two pt identifiers (name and ). Reviewed chart in preparation for visit and have obtained necessary documentation. Gil Juarez is a 55 y.o. male  Chief Complaint   Patient presents with    Surgical Follow-up    Wound Check     2 weeks postop in for in office procedure excision of posterior neck skin lesion     Visit Vitals  /76 (BP 1 Location: Left upper arm, BP Patient Position: Sitting, BP Cuff Size: Large adult)   Pulse 99   Temp 98.3 °F (36.8 °C) (Oral)   Ht 5' 10\" (1.778 m)   Wt (!) 370 lb (167.8 kg)   SpO2 97%   BMI 53.09 kg/m²       1. Have you been to the ER, urgent care clinic since your last visit? Hospitalized since your last visit? No    2. Have you seen or consulted any other health care providers outside of the [address] [place] [address] since your last visit? Include any pap smears or colon screening.  No

## 2022-11-14 ENCOUNTER — TELEPHONE (OUTPATIENT)
Dept: SURGERY | Age: 47
End: 2022-11-14

## 2022-11-14 NOTE — TELEPHONE ENCOUNTER
Mr Florencia Lovelace called this morning and stated he thinks he still has stitches left in his neck from his procedure. Not sure if he needs to come on or will they fall out.  Please call patient when able [phone]
Spoke with Dr. Vanessa Jackson regarding these sutures. He ask if we can bring pt in to take a look. Thank you.
No

## 2022-11-15 ENCOUNTER — OFFICE VISIT (OUTPATIENT)
Dept: SURGERY | Age: 47
End: 2022-11-15
Payer: COMMERCIAL

## 2022-11-15 VITALS
BODY MASS INDEX: 47.74 KG/M2 | HEART RATE: 98 BPM | OXYGEN SATURATION: 98 % | DIASTOLIC BLOOD PRESSURE: 91 MMHG | SYSTOLIC BLOOD PRESSURE: 125 MMHG | HEIGHT: 68 IN | WEIGHT: 315 LBS | TEMPERATURE: 98.6 F

## 2022-11-15 DIAGNOSIS — Z09 POSTOPERATIVE EXAMINATION: Primary | ICD-10-CM

## 2022-11-15 PROCEDURE — 99024 POSTOP FOLLOW-UP VISIT: CPT | Performed by: SURGERY

## 2022-11-15 NOTE — PROGRESS NOTES
Identified pt with two pt identifiers (name and ). Reviewed chart in preparation for visit and have obtained necessary documentation. Elvia Chowdhury is a 52 y.o. male  Chief Complaint   Patient presents with    Follow-up     Possible stitches in neck      Visit Vitals  BP (!) 125/91 (BP 1 Location: Left upper arm, BP Patient Position: Sitting, BP Cuff Size: Large adult)   Pulse 98   Temp 98.6 °F (37 °C) (Temporal)   Ht 5' 8\" (1.727 m)   Wt (!) 368 lb 6.4 oz (167.1 kg)   SpO2 98%   BMI 56.02 kg/m²       1. Have you been to the ER, urgent care clinic since your last visit? Hospitalized since your last visit? No    2. Have you seen or consulted any other health care providers outside of the [address] [place] [address] since your last visit? Include any pap smears or colon screening. No    Patient and provider made aware of elevated BP. Patient admits to not taking blood pressure medication this morning. Patient asymptomatic. Patient reminded to monitor BP, continue to take BP medications if prescribed, and follow up with PCP/Cardiologist.  Patient expressed understanding and agreement.

## 2022-11-15 NOTE — PROGRESS NOTES
[facility]      Clinic Note - Follow up    [address] [facility]. returns for scheduled follow up today. He is concerned about a possible suture remaining at the incision site on his posterior neck. He denies any significant drainage. He denies any fever or chills. Objective     Visit Vitals  BP (!) 125/91 (BP 1 Location: Left upper arm, BP Patient Position: Sitting, BP Cuff Size: Large adult)   Pulse 98   Temp 98.6 °F (37 °C) (Temporal)   Ht 5' 8\" (1.727 m)   Wt (!) 368 lb 6.4 oz (167.1 kg)   SpO2 98%   BMI 56.02 kg/m²         PE  GEN - Awake, alert, communicating appropriately. NAD  Pulm - CTAB  CV - RRR  Posterior neck - incision intact, suture in place          Assessment     Emilee Diego. is a 52 y. o.yr old male post excision of a posterior neck lesion. There is a surgical suture remaining in place. The incision is healing well without evidence of infection. Plan     The suture was removed in clinic. Further wound care instructions and return precautions were given. He may follow-up on an as-needed basis    10 mins of time was spent with the patient of which > 50% of the time involved face-to-face counseling of the patient regarding the proposed treatment plan.       Dakota Jones MD  11/15/2022    CC: Ava Ackerman MD

## 2022-11-20 DIAGNOSIS — Z79.4 DIABETES MELLITUS DUE TO UNDERLYING CONDITION WITH MILD NONPROLIFERATIVE RETINOPATHY WITHOUT MACULAR EDEMA, WITH LONG-TERM CURRENT USE OF INSULIN, UNSPECIFIED LATERALITY (HCC): ICD-10-CM

## 2022-11-20 DIAGNOSIS — E08.3299 DIABETES MELLITUS DUE TO UNDERLYING CONDITION WITH MILD NONPROLIFERATIVE RETINOPATHY WITHOUT MACULAR EDEMA, WITH LONG-TERM CURRENT USE OF INSULIN, UNSPECIFIED LATERALITY (HCC): ICD-10-CM

## 2022-11-20 DIAGNOSIS — I10 ESSENTIAL HYPERTENSION: ICD-10-CM

## 2022-11-20 DIAGNOSIS — E78.00 PURE HYPERCHOLESTEROLEMIA: ICD-10-CM

## 2023-03-24 ENCOUNTER — OFFICE VISIT (OUTPATIENT)
Dept: ENDOCRINOLOGY | Age: 48
End: 2023-03-24

## 2023-03-24 VITALS
HEIGHT: 68 IN | WEIGHT: 315 LBS | HEART RATE: 107 BPM | DIASTOLIC BLOOD PRESSURE: 86 MMHG | BODY MASS INDEX: 47.74 KG/M2 | SYSTOLIC BLOOD PRESSURE: 136 MMHG

## 2023-03-24 DIAGNOSIS — E78.00 PURE HYPERCHOLESTEROLEMIA: ICD-10-CM

## 2023-03-24 DIAGNOSIS — Z79.4 DIABETES MELLITUS DUE TO UNDERLYING CONDITION WITH MILD NONPROLIFERATIVE RETINOPATHY WITHOUT MACULAR EDEMA, WITH LONG-TERM CURRENT USE OF INSULIN, UNSPECIFIED LATERALITY (HCC): Primary | ICD-10-CM

## 2023-03-24 DIAGNOSIS — E08.3299 DIABETES MELLITUS DUE TO UNDERLYING CONDITION WITH MILD NONPROLIFERATIVE RETINOPATHY WITHOUT MACULAR EDEMA, WITH LONG-TERM CURRENT USE OF INSULIN, UNSPECIFIED LATERALITY (HCC): Primary | ICD-10-CM

## 2023-03-24 DIAGNOSIS — I10 ESSENTIAL HYPERTENSION: ICD-10-CM

## 2023-03-24 LAB — HBA1C MFR BLD HPLC: 9.2 %

## 2023-03-24 NOTE — PATIENT INSTRUCTIONS
1) Ask your customer service if freestyle fadia 2 or 3 is covered and if so, can you get this from a local or mail order pharmacy or do you need to get this from DME (durable medical equipment). If this is through the local or mail order, then I'll send it there but if through DME, then you'll contact the preferred provider and they will fax me a form and I'll fill it out and they will deliver it to you. 2) Your Hemoglobin A1c (3 month test of blood sugar) claire up to 9.2% from not having access to the CGM. 3)  I will send you a message through [employer] with your lab results.

## 2023-03-24 NOTE — PROGRESS NOTES
Chief Complaint   Patient presents with    Diabetes     PCP and pharmacy confirmed      History of Present Illness: Sandy Rebollar Sr. is a 52 y.o. male here for follow up of diabetes. Weight down 10 lbs since last visit in 6/22. Current pump settings are as follows:  - basal: 12a: 6.35 units/hr  - Carb ratio: 3  - sensitivity: 10  - target: 100-120  - active insulin time: 3 hr    Hasn't missed any doses of his BP pills but works nights and hasn't taken his doses yet today. Has been taking 1/2 tab of atorvastatin daily. Hasn't made any changes to his pump settings. He was using Dexcom and enjoying this and it was giving him good control but his insurance changed in January and he is no longer able to afford this so will look into coverage for [employer] instead. Fasting sugars are in the 70-90s. Has seen spikes over 200 after some meals when he doesn't count correctly.     he has the following indications to change from Dexcom to Play[employer]:  1) he has type 1 diabetes (E10.65) and is on an intensive insulin regimen with an insulin pump   2) he tests his blood sugar 3 times per day and makes treatment decisions off his blood sugar readings and dexcom sensor readings  3) he requires frequent adjustments to his insulin pump settings injection doses based on his dexcom sensor readings  4) he has benefited from therapeutic continuous glucose monitoring and I recommend that he continue with this  5) he is seen in my office every 6 months      Current Outpatient Medications   Medication Sig    insulin aspart U-100 (NovoLOG U-100 Insulin aspart) 100 unit/mL injection USE UP  UNITS DAILY AS DIRECTED VIA INSULIN PUMP    losartan (COZAAR) 100 mg tablet TAKE 1 TABLET DAILY  Indications: hypertension with left ventricular hypertrophy    chlorthalidone (HYGROTON) 25 mg tablet TAKE 1 TABLET DAILY    atorvastatin (LIPITOR) 10 mg tablet TAKE 1 TABLET DAILY    metFORMIN ER (GLUCOPHAGE XR) 500 mg tablet TAKE 2 TABLETS WITH BREAKFAST AND DINNER    insulin pump (PATIENT SUPPLIED) [facility] [place] by SubCUTAneous route continuous. multivitamin (ONE A DAY) tablet Take 1 Tab by mouth daily. aspirin 81 mg tablet Take 81 mg by mouth daily. No current facility-administered medications for this visit. Allergies   Allergen Reactions    Lisinopril Cough     Review of Systems: PER HPI    Physical Examination:  Blood pressure 136/86, pulse (!) 107, height 5' 8\" (1.727 m), weight (!) 369 lb (167.4 kg). General: pleasant, no distress, good eye contact   Neck: no carotid bruits  Cardiovascular: regular, normal rate, nl s1 and s2, no m/r/g,   Respiratory: clear bilaterally  Integumentary: no edema,   Psychiatric: normal mood and affect    Diabetic foot exam:     Left Foot:   Visual Exam: callous - moderate   Pulse DP: 2+ (normal)   Filament test: reduced sensation    Vibratory sensation: diminished      Right Foot:   Visual Exam: callous - moderate   Pulse DP: 2+ (normal)   Filament test: reduced sensation    Vibratory sensation: diminished      Data Reviewed:   Component      Latest Ref Rng & Units 3/24/2023           3:04 PM   Hemoglobin A1c (POC)      % 9.2       Assessment/Plan:     1. 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 He appears to have Type 1 diabetes not type 2 given his c-peptide was 0.8 in 11/16. However, given his weight, I agree with metformin to help with insulin resistance. His A1c was worse due to not having access to CGM.   - cont metformin  mg 2 tabs bid  - cont current pump settings   - begin freestyle fadia  - check bs 3 times per day  - foot exam done 3/23  - eye exam 2021--will obtain report  - microalbumin nl 12/21  - check cmp and microalbumin now        2. Essential hypertension: his BP was at goal < 140/90   - cont losartan 100 mg daily  - cont chlorthalidone 25 mg daily        3. Pure hypercholesterolemia: LDL 39 in 10/17 on atorvastatin 10 mg daily and 31 in 11/18 and in 10/19 so decreased to 1/2 tab daily and still 23 in 10/20 so decreased to 1/2 tab 3x/week and LDL 19 in 12/21. Has been taking 1/2 tab daily as of 3/23 so will interpret labs accordingly. - cont lipitor 10 mg 1/2 tab daily  - check lipids now          Patient Instructions   1) Ask your customer service if freestyle fadia 2 or 3 is covered and if so, can you get this from a local or mail order pharmacy or do you need to get this from DME (durable medical equipment). If this is through the local or mail order, then I'll send it there but if through DME, then you'll contact the preferred provider and they will fax me a form and I'll fill it out and they will deliver it to you. 2) Your Hemoglobin A1c (3 month test of blood sugar) claire up to 9.2% from not having access to the CGM. 3)  I will send you a message through [email or web address] with your lab results. Follow-up and Dispositions    Return in about 6 months (around 9/24/2023).                Copy sent to:  Tremaine Mathews MD    Lab follow up: 03/26/23  Component      Latest Ref Rng & Units 3/24/2023 3/24/2023 3/24/2023           3:44 PM  3:44 PM  3:44 PM   Sodium      136 - 145 mmol/L  137    Potassium      3.5 - 5.1 mmol/L  3.4 (L)    Chloride      97 - 108 mmol/L  105    CO2      21 - 32 mmol/L  27    Anion gap      5 - 15 mmol/L  5    Glucose      65 - 100 mg/dL  84    BUN      6 - 20 MG/DL  16    Creatinine      0.70 - 1.30 MG/DL  0.98    BUN/Creatinine ratio      12 - 20    16    eGFR      >60 ml/min/1.73m2  >60    Calcium      8.5 - 10.1 MG/DL  9.4 Bilirubin, total      0.2 - 1.0 MG/DL  0.5    ALT      12 - 78 U/L  77    AST      15 - 37 U/L  40 (H)    Alk. phosphatase      45 - 117 U/L  54    Protein, total      6.4 - 8.2 g/dL  8.2    Albumin      3.5 - 5.0 g/dL  4.2    Globulin      2.0 - 4.0 g/dL  4.0    A-G Ratio      1.1 - 2.2    1.1    Cholesterol, total      <200 MG/DL 63     Triglyceride      <150 MG/     HDL Cholesterol      MG/DL 35     LDL, calculated      0 - 100 MG/DL 4.2     VLDL, calculated      MG/DL 23.8     CHOL/HDL Ratio      0.0 - 5.0   1.8     Microalbumin,urine random      MG/DL   0.85   Creatinine, urine random      mg/dL   85.70   Microalbumin/Creat. Ratio      0 - 30 mg/g   10     Sent him the following message through [employer]:    -------------------------------------------------------------------------------------------------------------------  Total Cholesterol is the total number of cholesterol particles in your blood. Goal is less than 200. Triglycerides are the short term fats in your blood. Goal is less than 150. HDL is the good cholesterol in your blood. Goal is more than 50 if you are a woman and 40 if you are a man. LDL is the bad cholesterol in your blood. Goal is less than 100 unless you have a history of heart disease or stroke and then goal is under 70. Your cholesterol is very well controlled and you are on more atorvastatin than you need. Please decrease your dose to 1/2 tab 3x/week on Mon, Wed, and Fri only. I updated your med list but did not send a new prescription to your pharmacy on file that has been filling this med. Continue to follow a low cholesterol diet. Try to limit the amount of fried foods, fatty foods, butter, gravy, red meat, ice cream, cheese, and eggs in your diet, which are all high in cholesterol.  -------------------------------------------------------------------------------------------------------------------  BUN and creatinine are markers of kidney function.   Your values are normal.  -------------------------------------------------------------------------------------------------------------------  ALT and AST are markers of liver function. Your AST is just slightly high.  -------------------------------------------------------------------------------------------------------------------  Microalbumin/creatinine ratio is a marker of the amount of protein in your urine. Goal is less than 30. Your value is normal. This indicates that your kidneys are not being affected by your diabetes and/or blood pressure.  -------------------------------------------------------------------------------------------------------------------  I put a lab order directly into the [employer] portal to use to repeat your labs prior to your next visit. Go under menu and my record and scroll down to upcoming tests and procedures and please print this off to bring a copy of the order to the lab. If you are unable to print this off, then let me know and I can mail you an order as we will be having a computer system upgrade in May and I have been informed the order will likely not cross directly into the ReplySend system so I want to be sure you have a hard copy to take to the lab.

## 2023-03-26 RX ORDER — ATORVASTATIN CALCIUM 10 MG/1
TABLET, FILM COATED ORAL
Qty: 90 TABLET | Refills: 3
Start: 2023-03-26

## 2023-04-18 ENCOUNTER — OFFICE VISIT (OUTPATIENT)
Dept: INTERNAL MEDICINE CLINIC | Age: 48
End: 2023-04-18
Payer: COMMERCIAL

## 2023-04-18 VITALS
DIASTOLIC BLOOD PRESSURE: 96 MMHG | OXYGEN SATURATION: 98 % | RESPIRATION RATE: 19 BRPM | BODY MASS INDEX: 47.74 KG/M2 | SYSTOLIC BLOOD PRESSURE: 144 MMHG | WEIGHT: 315 LBS | TEMPERATURE: 98.4 F | HEIGHT: 68 IN | HEART RATE: 97 BPM

## 2023-04-18 DIAGNOSIS — M51.16 LUMBAR DISC DISEASE WITH RADICULOPATHY: Primary | ICD-10-CM

## 2023-04-18 PROCEDURE — 99213 OFFICE O/P EST LOW 20 MIN: CPT | Performed by: INTERNAL MEDICINE

## 2023-04-18 RX ORDER — IBUPROFEN 800 MG/1
800 TABLET ORAL
Qty: 60 TABLET | Refills: 1 | Status: SHIPPED | OUTPATIENT
Start: 2023-04-18

## 2023-04-18 NOTE — PROGRESS NOTES
Chief Complaint   Patient presents with    Back Pain     Pt states has been having back pain since 4/14/23. Pt says feel like aching pain feels its stimulated from R thigh whci feels like the start of a marci horse(cramp) and radiates to back          Vitals:    04/18/23 1123   BP: (!) 144/96   Pulse: 97   Resp: 19   Temp: 98.4 °F (36.9 °C)   TempSrc: Temporal   SpO2: 98%   Weight: (!) 363 lb (164.7 kg)   Height: 5' 8\" (1.727 m)   PainSc:   5   PainLoc: Back       Current Outpatient Medications on File Prior to Visit   Medication Sig Dispense Refill    atorvastatin (LIPITOR) 10 mg tablet TAKE 1/2 TABLET 3X/WEEK--Dose change 03/26/23--updated med list--did not send prescription to the pharmacy 90 Tablet 3    insulin aspart U-100 (NovoLOG U-100 Insulin aspart) 100 unit/mL injection USE UP  UNITS DAILY AS DIRECTED VIA INSULIN PUMP 230 mL 3    losartan (COZAAR) 100 mg tablet TAKE 1 TABLET DAILY  Indications: hypertension with left ventricular hypertrophy 90 Tablet 3    chlorthalidone (HYGROTON) 25 mg tablet TAKE 1 TABLET DAILY 90 Tablet 3    metFORMIN ER (GLUCOPHAGE XR) 500 mg tablet TAKE 2 TABLETS WITH BREAKFAST AND DINNER 360 Tablet 3    insulin pump (PATIENT SUPPLIED) misc by SubCUTAneous route continuous. multivitamin (ONE A DAY) tablet Take 1 Tablet by mouth daily. aspirin 81 mg tablet Take 1 Tablet by mouth daily. No current facility-administered medications on file prior to visit. Health Maintenance Due   Topic    Hepatitis B Vaccine (1 of 3 - Risk 3-dose series)    DTaP/Tdap/Td series (1 - Tdap)    Colorectal Cancer Screening Combo     COVID-19 Vaccine (2 - Moderna series)       1. \"Have you been to the ER, urgent care clinic since your last visit? Hospitalized since your last visit? \" No    2. \"Have you seen or consulted any other health care providers outside of the [address] [place] [address] since your last visit? \" No     3.  For patients aged 39-70: Has the patient had a colonoscopy / FIT/ Cologuard? No      If the patient is female:    4. For patients aged 41-77: Has the patient had a mammogram within the past 2 years? NA - based on age or sex      11. For patients aged 21-65: Has the patient had a pap smear?  NA - based on age or sex

## 2023-04-18 NOTE — PROGRESS NOTES
Chief Complaint   Patient presents with    Back Pain     Pt states has been having back pain since 4/14/23. Pt says feel like aching pain feels its stimulated from R thigh whci feels like the start of a marci horse(cramp) and radiates to back     SUBJECTIVE:   Dhruv Dumont is a 52 y.o. male who complains of low back pain for 4 day(s), positional with bending or lifting, with radiation down the legs. Precipitating factors: recent  yard work  Prior history of back problems: no prior back problems. There is no numbness in the legs. Or weakness      OBJECTIVE:  Visit Vitals  BP (!) 144/96 (BP 1 Location: Left lower arm, BP Patient Position: Sitting, BP Cuff Size: Adult long)   Pulse 97   Temp 98.4 °F (36.9 °C) (Temporal)   Resp 19   Ht 5' 8\" (1.727 m)   Wt (!) 363 lb (164.7 kg)   SpO2 98%   BMI 55.19 kg/m²      Patient appears to be in mild to moderate pain, antalgic gait noted. Lumbosacral spine area reveals no local tenderness or mass. Painful and reduced LS ROM noted. Straight leg raise is negative at 45 degrees on right. DTR's, motor strength and sensation normal, including heel and toe gait. Peripheral pulses are palpable. X-Ray: not available. Lab Results   Component Value Date/Time    GFR est AA >60 12/03/2021 09:52 AM    GFR est non-AA >60 12/03/2021 09:52 AM    Creatinine 0.98 03/24/2023 03:44 PM    BUN 16 03/24/2023 03:44 PM    Sodium 137 03/24/2023 03:44 PM    Potassium 3.4 (L) 03/24/2023 03:44 PM    Chloride 105 03/24/2023 03:44 PM    CO2 27 03/24/2023 03:44 PM       ASSESSMENT:   lumbar strain and with radiculopathy    PLAN:  For acute pain, rest, intermittent application of heat (do not sleep on heating pad), analgesics and muscle relaxants are recommended. Discussed longer term treatment plan of prn NSAID's and discussed a home back care exercise program with flexion exercise routine. Proper lifting with avoidance of heavy lifting discussed.  Consider Physical Therapy and XRay studies if not improving. Call or return to clinic prn if these symptoms worsen or fail to improve as anticipated. Diagnoses and all orders for this visit:    1. Lumbar disc disease with radiculopathy  -     ibuprofen (MOTRIN) 800 mg tablet;  Take 1 Tablet by mouth every eight (8) hours as needed for Pain.  -     REFERRAL TO PHYSICAL THERAPY      Ice      Exercise program for specific area of concern is given with written instructions

## 2023-04-23 DIAGNOSIS — E78.00 PURE HYPERCHOLESTEROLEMIA: Primary | ICD-10-CM

## 2023-04-24 DIAGNOSIS — E78.00 PURE HYPERCHOLESTEROLEMIA: Primary | ICD-10-CM

## 2023-05-31 RX ORDER — LOSARTAN POTASSIUM 100 MG/1
TABLET ORAL
Qty: 90 TABLET | Refills: 1 | Status: SHIPPED | OUTPATIENT
Start: 2023-05-31

## 2023-06-05 RX ORDER — METFORMIN HYDROCHLORIDE 500 MG/1
TABLET, EXTENDED RELEASE ORAL
Qty: 360 TABLET | Refills: 3 | Status: SHIPPED | OUTPATIENT
Start: 2023-06-05

## 2023-06-06 RX ORDER — CHLORTHALIDONE 25 MG/1
25 TABLET ORAL DAILY
Qty: 90 TABLET | Refills: 1 | Status: SHIPPED | OUTPATIENT
Start: 2023-06-06

## 2023-09-15 DIAGNOSIS — E78.00 PURE HYPERCHOLESTEROLEMIA: ICD-10-CM

## 2023-09-15 DIAGNOSIS — E08.3299 DIABETES MELLITUS DUE TO UNDERLYING CONDITION WITH MILD NONPROLIFERATIVE RETINOPATHY WITHOUT MACULAR EDEMA, WITH LONG-TERM CURRENT USE OF INSULIN, UNSPECIFIED LATERALITY (HCC): ICD-10-CM

## 2023-09-15 DIAGNOSIS — Z79.4 DIABETES MELLITUS DUE TO UNDERLYING CONDITION WITH MILD NONPROLIFERATIVE RETINOPATHY WITHOUT MACULAR EDEMA, WITH LONG-TERM CURRENT USE OF INSULIN, UNSPECIFIED LATERALITY (HCC): ICD-10-CM

## 2023-09-15 DIAGNOSIS — I10 ESSENTIAL HYPERTENSION: ICD-10-CM

## 2023-09-29 ENCOUNTER — OFFICE VISIT (OUTPATIENT)
Age: 48
End: 2023-09-29

## 2023-09-29 VITALS
WEIGHT: 315 LBS | HEIGHT: 68 IN | SYSTOLIC BLOOD PRESSURE: 134 MMHG | HEART RATE: 109 BPM | DIASTOLIC BLOOD PRESSURE: 83 MMHG | BODY MASS INDEX: 47.74 KG/M2

## 2023-09-29 DIAGNOSIS — I10 ESSENTIAL (PRIMARY) HYPERTENSION: ICD-10-CM

## 2023-09-29 DIAGNOSIS — E08.3299 DIABETES MELLITUS DUE TO UNDERLYING CONDITION WITH MILD NONPROLIFERATIVE RETINOPATHY WITHOUT MACULAR EDEMA, WITH LONG-TERM CURRENT USE OF INSULIN, UNSPECIFIED LATERALITY (HCC): Primary | ICD-10-CM

## 2023-09-29 DIAGNOSIS — Z79.4 DIABETES MELLITUS DUE TO UNDERLYING CONDITION WITH MILD NONPROLIFERATIVE RETINOPATHY WITHOUT MACULAR EDEMA, WITH LONG-TERM CURRENT USE OF INSULIN, UNSPECIFIED LATERALITY (HCC): Primary | ICD-10-CM

## 2023-09-29 DIAGNOSIS — E78.00 PURE HYPERCHOLESTEROLEMIA, UNSPECIFIED: ICD-10-CM

## 2023-09-29 LAB — HBA1C MFR BLD: 8.9 %

## 2023-09-29 NOTE — PATIENT INSTRUCTIONS
1) Your Hemoglobin A1c (3 month test of blood sugar) was 8.9% down from 9.2% and hopefully with having more consistent access to the [place], your next one will be better. 2) You can look into [employer] for the freestyle ronnell from [employer] to help keep the sensors in place. 3) Your blood pressure is controlled.

## 2023-09-29 NOTE — PROGRESS NOTES
Chief Complaint   Patient presents with    Diabetes     PCP and pharmacy confirmed      History of Present Illness: Jim Garner Sr. is a 52 y.o. male here for follow up of diabetes. Weight up 4 lbs since last visit in 3/23. Current pump settings are as follows:  - basal: 12a: 6.35 units/hr  - Carb ratio: 3  - sensitivity: 10  - target: 100-120  - active insulin time: 3 hr    he has the following indications to continue Freestyle Adrianna:  1) he has type 1 diabetes (E10.65) and is on an intensive insulin regimen with an insulin pump   2) he tests his blood sugar 3 times per day and makes treatment decisions off his blood sugar readings and adrianna sensor readings  3) he requires frequent adjustments to his insulin pump settings injection doses based on his adrianna sensor readings  4) he has benefited from therapeutic continuous glucose monitoring and I recommend that he continue with this  5) he is seen in my office every 6 months    Has been using the [place] in place of the dexcom but was having a lot of issues with the sensors not staying in place and about a month ago had finally found \"second skin\" to cover the sensor and this has helped keep them on closer to 14 days. Review of his data over the past 4 days shows time in range over 70% and an avg sugar under 150. However he states when he was doing fingersticks was having a lot of readings in the 200s this summer. Compliant with BP and lipid regimen.         Current Outpatient Medications   Medication Sig    chlorthalidone (HYGROTON) 25 MG tablet Take 1 tablet by mouth daily    metFORMIN (GLUCOPHAGE-XR) 500 MG extended release tablet TAKE 2 TABLETS WITH BREAKFAST AND DINNER    losartan (COZAAR) 100 MG tablet TAKE 1 TABLET BY MOUTH EVERY DAY    atorvastatin (LIPITOR) 10 MG tablet Take 1/2 tab 3x/week    insulin aspart (NOVOLOG) 100 UNIT/ML injection vial USE UP  UNITS DAILY AS DIRECTED VIA INSULIN PUMP     No current facility-administered medications for

## 2023-10-31 RX ORDER — INSULIN ASPART 100 [IU]/ML
INJECTION, SOLUTION INTRAVENOUS; SUBCUTANEOUS
Qty: 230 ML | Refills: 3 | Status: SHIPPED | OUTPATIENT
Start: 2023-10-31

## 2024-01-08 RX ORDER — CHLORTHALIDONE 25 MG/1
25 TABLET ORAL DAILY
Qty: 90 TABLET | Refills: 0 | Status: SHIPPED | OUTPATIENT
Start: 2024-01-08

## 2024-01-08 RX ORDER — LOSARTAN POTASSIUM 100 MG/1
TABLET ORAL
Qty: 90 TABLET | Refills: 0 | Status: SHIPPED | OUTPATIENT
Start: 2024-01-08

## 2024-03-14 DIAGNOSIS — I10 ESSENTIAL (PRIMARY) HYPERTENSION: ICD-10-CM

## 2024-03-14 DIAGNOSIS — E78.00 PURE HYPERCHOLESTEROLEMIA, UNSPECIFIED: ICD-10-CM

## 2024-03-14 DIAGNOSIS — Z79.4 DIABETES MELLITUS DUE TO UNDERLYING CONDITION WITH MILD NONPROLIFERATIVE RETINOPATHY WITHOUT MACULAR EDEMA, WITH LONG-TERM CURRENT USE OF INSULIN, UNSPECIFIED LATERALITY (HCC): ICD-10-CM

## 2024-03-14 DIAGNOSIS — E08.3299 DIABETES MELLITUS DUE TO UNDERLYING CONDITION WITH MILD NONPROLIFERATIVE RETINOPATHY WITHOUT MACULAR EDEMA, WITH LONG-TERM CURRENT USE OF INSULIN, UNSPECIFIED LATERALITY (HCC): ICD-10-CM

## 2024-03-29 ENCOUNTER — OFFICE VISIT (OUTPATIENT)
Age: 49
End: 2024-03-29

## 2024-03-29 VITALS
DIASTOLIC BLOOD PRESSURE: 73 MMHG | SYSTOLIC BLOOD PRESSURE: 129 MMHG | HEART RATE: 108 BPM | BODY MASS INDEX: 47.74 KG/M2 | WEIGHT: 315 LBS | HEIGHT: 68 IN

## 2024-03-29 DIAGNOSIS — E08.3299 DIABETES MELLITUS DUE TO UNDERLYING CONDITION WITH MILD NONPROLIFERATIVE RETINOPATHY WITHOUT MACULAR EDEMA, WITH LONG-TERM CURRENT USE OF INSULIN, UNSPECIFIED LATERALITY (HCC): Primary | ICD-10-CM

## 2024-03-29 DIAGNOSIS — I10 ESSENTIAL (PRIMARY) HYPERTENSION: ICD-10-CM

## 2024-03-29 DIAGNOSIS — E78.00 PURE HYPERCHOLESTEROLEMIA, UNSPECIFIED: ICD-10-CM

## 2024-03-29 DIAGNOSIS — Z79.4 DIABETES MELLITUS DUE TO UNDERLYING CONDITION WITH MILD NONPROLIFERATIVE RETINOPATHY WITHOUT MACULAR EDEMA, WITH LONG-TERM CURRENT USE OF INSULIN, UNSPECIFIED LATERALITY (HCC): Primary | ICD-10-CM

## 2024-03-29 LAB — HBA1C MFR BLD: 8.6 %

## 2024-03-29 RX ORDER — CHLORTHALIDONE 25 MG/1
25 TABLET ORAL DAILY
Qty: 90 TABLET | Refills: 3 | Status: SHIPPED | OUTPATIENT
Start: 2024-03-29

## 2024-03-29 RX ORDER — ATORVASTATIN CALCIUM 10 MG/1
TABLET, FILM COATED ORAL
Qty: 20 TABLET | Refills: 3 | Status: SHIPPED | OUTPATIENT
Start: 2024-03-29

## 2024-03-29 RX ORDER — LOSARTAN POTASSIUM 100 MG/1
100 TABLET ORAL DAILY
Qty: 90 TABLET | Refills: 3 | Status: SHIPPED | OUTPATIENT
Start: 2024-03-29

## 2024-03-29 NOTE — PROGRESS NOTES
Chief Complaint   Patient presents with    Diabetes     PCP and pharmacy confirmed     History of Present Illness: Elliott Briseno Sr. is a 48 y.o. male here for follow up of diabetes.  Weight up 3 lbs since last visit in 9/23.    Current pump settings are as follows:  - basal: 12a: 6.35 units/hr  - Carb ratio: 3  - sensitivity: 10  - target: 100-120  - active insulin time: 3 hr    he has the following indications to continue Freestyle Adrianna:  1) he has type 1 diabetes (E10.65) and is on an intensive insulin regimen with an insulin pump   2) he tests his blood sugar 3 times per day and makes treatment decisions off his blood sugar readings and adrianna sensor readings  3) he requires frequent adjustments to his insulin pump settings injection doses based on his adrianna sensor readings  4) he has benefited from therapeutic continuous glucose monitoring and I recommend that he continue with this  5) he is seen in my office every 6 months    Has found that the 2nd skin is still helping keep the adrianna in place.  Hasn't made any changes to his settings.  Review of his adrianna data over the past 90 days shows an avg of 190 with 48% in range, 29% high and 21% very high.  Most of his highs are either from forgetting to bolus or having site issues or forgetting to fill his pump with insulin and no trends to warrant changes.  Compliant with BP and lipid regimen.  Will go get labs in the next 1-2 weeks.                Current Outpatient Medications   Medication Sig    chlorthalidone (HYGROTON) 25 MG tablet TAKE 1 TABLET BY MOUTH DAILY    losartan (COZAAR) 100 MG tablet TAKE 1 TABLET BY MOUTH EVERY DAY    insulin aspart (NOVOLOG) 100 UNIT/ML injection vial USE UP  UNITS VIA INSULIN PUMP DAILY AS DIRECTED    metFORMIN (GLUCOPHAGE-XR) 500 MG extended release tablet TAKE 2 TABLETS WITH BREAKFAST AND DINNER    atorvastatin (LIPITOR) 10 MG tablet Take 1/2 tab 3x/week     No current facility-administered medications for this visit.

## 2024-03-29 NOTE — PATIENT INSTRUCTIONS
1) Your Hemoglobin A1c (3 month test of blood sugar) was 8.6% down from 8.9% and 9.2% over the past year.    2) You do have plenty of good readings and I think your A1c is high due to forgetting to bolus at times or your pump running out of insulin or site issues so we'll keep your settings the same.    3)  I will send you a message through [employer] with your lab results.  Please call [phone] to reset your [employer] password.

## 2024-05-04 LAB
ALBUMIN SERPL-MCNC: 4.5 G/DL (ref 4.1–5.1)
ALBUMIN/CREAT UR: 13 MG/G CREAT (ref 0–29)
ALBUMIN/GLOB SERPL: 1.6 {RATIO} (ref 1.2–2.2)
ALP SERPL-CCNC: 54 IU/L (ref 44–121)
ALT SERPL-CCNC: 59 IU/L (ref 0–44)
AST SERPL-CCNC: 37 IU/L (ref 0–40)
BILIRUB SERPL-MCNC: 0.4 MG/DL (ref 0–1.2)
BUN SERPL-MCNC: 17 MG/DL (ref 6–24)
BUN/CREAT SERPL: 19 (ref 9–20)
CALCIUM SERPL-MCNC: 9.3 MG/DL (ref 8.7–10.2)
CHLORIDE SERPL-SCNC: 100 MMOL/L (ref 96–106)
CHOLEST SERPL-MCNC: 107 MG/DL (ref 100–199)
CO2 SERPL-SCNC: 24 MMOL/L (ref 20–29)
CREAT SERPL-MCNC: 0.88 MG/DL (ref 0.76–1.27)
CREAT UR-MCNC: 167.7 MG/DL
EGFRCR SERPLBLD CKD-EPI 2021: 106 ML/MIN/1.73
GLOBULIN SER CALC-MCNC: 2.9 G/DL (ref 1.5–4.5)
GLUCOSE SERPL-MCNC: 112 MG/DL (ref 70–99)
HDLC SERPL-MCNC: 33 MG/DL
IMP & REVIEW OF LAB RESULTS: NORMAL
LDLC SERPL CALC-MCNC: 51 MG/DL (ref 0–99)
MICROALBUMIN UR-MCNC: 22.1 UG/ML
POTASSIUM SERPL-SCNC: 3.9 MMOL/L (ref 3.5–5.2)
PROT SERPL-MCNC: 7.4 G/DL (ref 6–8.5)
SODIUM SERPL-SCNC: 139 MMOL/L (ref 134–144)
TRIGL SERPL-MCNC: 127 MG/DL (ref 0–149)
VLDLC SERPL CALC-MCNC: 23 MG/DL (ref 5–40)

## 2024-07-06 RX ORDER — METFORMIN HYDROCHLORIDE 500 MG/1
TABLET, EXTENDED RELEASE ORAL
Qty: 360 TABLET | Refills: 3 | Status: SHIPPED | OUTPATIENT
Start: 2024-07-06

## 2024-07-09 RX ORDER — METFORMIN HYDROCHLORIDE 500 MG/1
TABLET, EXTENDED RELEASE ORAL
Qty: 360 TABLET | Refills: 3 | Status: SHIPPED | OUTPATIENT
Start: 2024-07-09

## 2024-09-15 RX ORDER — INSULIN ASPART 100 [IU]/ML
INJECTION, SOLUTION INTRAVENOUS; SUBCUTANEOUS
Qty: 230 ML | Refills: 3 | Status: SHIPPED | OUTPATIENT
Start: 2024-09-15

## 2024-09-15 RX ORDER — INSULIN ASPART 100 [IU]/ML
INJECTION, SOLUTION INTRAVENOUS; SUBCUTANEOUS
Qty: 230 ML | Refills: 3 | Status: CANCELLED | OUTPATIENT
Start: 2024-09-15

## 2024-09-26 ENCOUNTER — OFFICE VISIT (OUTPATIENT)
Age: 49
End: 2024-09-26
Payer: COMMERCIAL

## 2024-09-26 VITALS
DIASTOLIC BLOOD PRESSURE: 90 MMHG | SYSTOLIC BLOOD PRESSURE: 148 MMHG | HEIGHT: 68 IN | BODY MASS INDEX: 47.74 KG/M2 | WEIGHT: 315 LBS | HEART RATE: 99 BPM

## 2024-09-26 DIAGNOSIS — E08.3299 DIABETES MELLITUS DUE TO UNDERLYING CONDITION WITH MILD NONPROLIFERATIVE RETINOPATHY WITHOUT MACULAR EDEMA, WITH LONG-TERM CURRENT USE OF INSULIN, UNSPECIFIED LATERALITY (HCC): Primary | ICD-10-CM

## 2024-09-26 DIAGNOSIS — E78.00 PURE HYPERCHOLESTEROLEMIA, UNSPECIFIED: ICD-10-CM

## 2024-09-26 DIAGNOSIS — Z79.4 DIABETES MELLITUS DUE TO UNDERLYING CONDITION WITH MILD NONPROLIFERATIVE RETINOPATHY WITHOUT MACULAR EDEMA, WITH LONG-TERM CURRENT USE OF INSULIN, UNSPECIFIED LATERALITY (HCC): Primary | ICD-10-CM

## 2024-09-26 DIAGNOSIS — I10 ESSENTIAL (PRIMARY) HYPERTENSION: ICD-10-CM

## 2024-09-26 LAB
ALBUMIN SERPL-MCNC: 4.8 G/DL (ref 4.1–5.1)
ALBUMIN/CREAT UR: <13 MG/G CREAT (ref 0–29)
ALP SERPL-CCNC: 55 IU/L (ref 44–121)
ALT SERPL-CCNC: 54 IU/L (ref 0–44)
AST SERPL-CCNC: 35 IU/L (ref 0–40)
BILIRUB SERPL-MCNC: 0.7 MG/DL (ref 0–1.2)
BUN SERPL-MCNC: 13 MG/DL (ref 6–24)
BUN/CREAT SERPL: 14 (ref 9–20)
CALCIUM SERPL-MCNC: 9.2 MG/DL (ref 8.7–10.2)
CHLORIDE SERPL-SCNC: 96 MMOL/L (ref 96–106)
CHOLEST SERPL-MCNC: 122 MG/DL (ref 100–199)
CO2 SERPL-SCNC: 26 MMOL/L (ref 20–29)
CREAT SERPL-MCNC: 0.96 MG/DL (ref 0.76–1.27)
CREAT UR-MCNC: 23.5 MG/DL
EGFRCR SERPLBLD CKD-EPI 2021: 98 ML/MIN/1.73
GLOBULIN SER CALC-MCNC: 3.1 G/DL (ref 1.5–4.5)
GLUCOSE SERPL-MCNC: 94 MG/DL (ref 70–99)
HBA1C MFR BLD: 8.4 % (ref 4.8–5.6)
HDLC SERPL-MCNC: 32 MG/DL
IMP & REVIEW OF LAB RESULTS: NORMAL
LDLC SERPL CALC-MCNC: 56 MG/DL (ref 0–99)
Lab: NORMAL
MICROALBUMIN UR-MCNC: <3 UG/ML
POTASSIUM SERPL-SCNC: 3.5 MMOL/L (ref 3.5–5.2)
PROT SERPL-MCNC: 7.9 G/DL (ref 6–8.5)
SODIUM SERPL-SCNC: 137 MMOL/L (ref 134–144)
TRIGL SERPL-MCNC: 204 MG/DL (ref 0–149)
VLDLC SERPL CALC-MCNC: 34 MG/DL (ref 5–40)

## 2024-09-26 PROCEDURE — 3077F SYST BP >= 140 MM HG: CPT | Performed by: INTERNAL MEDICINE

## 2024-09-26 PROCEDURE — 3080F DIAST BP >= 90 MM HG: CPT | Performed by: INTERNAL MEDICINE

## 2024-09-26 PROCEDURE — 99214 OFFICE O/P EST MOD 30 MIN: CPT | Performed by: INTERNAL MEDICINE

## 2024-09-26 PROCEDURE — 95251 CONT GLUC MNTR ANALYSIS I&R: CPT | Performed by: INTERNAL MEDICINE

## 2025-03-12 DIAGNOSIS — E08.3299: ICD-10-CM

## 2025-03-12 DIAGNOSIS — E78.00 PURE HYPERCHOLESTEROLEMIA, UNSPECIFIED: ICD-10-CM

## 2025-03-12 DIAGNOSIS — Z79.4: ICD-10-CM

## 2025-03-12 DIAGNOSIS — I10 ESSENTIAL (PRIMARY) HYPERTENSION: ICD-10-CM

## 2025-04-02 RX ORDER — CHLORTHALIDONE 25 MG/1
25 TABLET ORAL DAILY
Qty: 90 TABLET | Refills: 3 | Status: SHIPPED | OUTPATIENT
Start: 2025-04-02

## 2025-04-02 RX ORDER — LOSARTAN POTASSIUM 100 MG/1
100 TABLET ORAL DAILY
Qty: 90 TABLET | Refills: 3 | Status: SHIPPED | OUTPATIENT
Start: 2025-04-02

## 2025-06-03 RX ORDER — ATORVASTATIN CALCIUM 10 MG/1
TABLET, FILM COATED ORAL
Qty: 20 TABLET | Refills: 3 | Status: SHIPPED | OUTPATIENT
Start: 2025-06-03

## 2025-08-04 ENCOUNTER — TELEPHONE (OUTPATIENT)
Age: 50
End: 2025-08-04

## 2025-08-23 DIAGNOSIS — E10.65 TYPE 1 DIABETES MELLITUS WITH HYPERGLYCEMIA (HCC): Primary | ICD-10-CM

## 2025-08-23 RX ORDER — INSULIN ASPART 100 [IU]/ML
INJECTION, SOLUTION INTRAVENOUS; SUBCUTANEOUS
Qty: 230 ML | Refills: 3 | Status: SHIPPED | OUTPATIENT
Start: 2025-08-23